# Patient Record
Sex: FEMALE | Race: WHITE | NOT HISPANIC OR LATINO | Employment: FULL TIME | URBAN - METROPOLITAN AREA
[De-identification: names, ages, dates, MRNs, and addresses within clinical notes are randomized per-mention and may not be internally consistent; named-entity substitution may affect disease eponyms.]

---

## 2017-01-23 ENCOUNTER — OFFICE VISIT (OUTPATIENT)
Dept: ENDOCRINOLOGY | Facility: CLINIC | Age: 42
End: 2017-01-23

## 2017-01-23 VITALS
HEIGHT: 70 IN | SYSTOLIC BLOOD PRESSURE: 141 MMHG | DIASTOLIC BLOOD PRESSURE: 99 MMHG | WEIGHT: 246 LBS | BODY MASS INDEX: 35.22 KG/M2 | OXYGEN SATURATION: 99 % | HEART RATE: 85 BPM

## 2017-01-23 DIAGNOSIS — E66.01 MORBID OBESITY DUE TO EXCESS CALORIES (H): ICD-10-CM

## 2017-01-23 DIAGNOSIS — I10 BENIGN ESSENTIAL HYPERTENSION: Primary | ICD-10-CM

## 2017-01-23 RX ORDER — NIFEDIPINE 90 MG/1
90 TABLET, EXTENDED RELEASE ORAL DAILY
Qty: 90 TABLET | Refills: 4 | Status: SHIPPED | OUTPATIENT
Start: 2017-01-23 | End: 2018-04-03

## 2017-01-23 RX ORDER — BUPROPION HYDROCHLORIDE 150 MG/1
150 TABLET, EXTENDED RELEASE ORAL 2 TIMES DAILY
Qty: 180 TABLET | Refills: 4 | Status: SHIPPED | OUTPATIENT
Start: 2017-01-23 | End: 2018-01-16

## 2017-01-23 RX ORDER — NALTREXONE HYDROCHLORIDE AND BUPROPION HYDROCHLORIDE 8; 90 MG/1; MG/1
TABLET, EXTENDED RELEASE ORAL
COMMUNITY
Start: 2016-04-19 | End: 2017-05-08

## 2017-01-23 RX ORDER — NALTREXONE HYDROCHLORIDE 50 MG/1
50 TABLET, FILM COATED ORAL DAILY
Qty: 90 TABLET | Refills: 4 | Status: SHIPPED | OUTPATIENT
Start: 2017-01-23 | End: 2018-01-16

## 2017-01-23 NOTE — Clinical Note
"2017       RE: Holli Saxena  [address]  [facility] [address]     Dear Colleague,    Thank you for referring your patient, Holli Saxena, to the [facility] MEDICAL WEIGHT MANAGEMENT at [facility]. Please see a copy of my visit note below.        Return Medical Weight Management Note     Holli Saxena  MRN:  [id number]  :  1975  ALIA:  2017    Dear Nivia Ruiz,    I had the pleasure of seeing your patient Holli Saxena.  She is a 41 year old female who I am continuing to see for treatment of obesity related to:  HTN, hyperlipidemia, and PCOS    INTERVAL HISTORY:  Weight unchanged. She is stress eating sugar and has not yet started an exercise plan. Her child is 2 years old now and so she is not breastfeeding and is not planning on getting pregnant.      ASSESSMENT:   Obesity, unchanged  HTN, uncontrolled    PLAN:   Increase procardia from 60 to 90 mg daily  Continue naltrexone 50 mg daily & wellbutrin  mg bid  We talked about replacing wellbutrin with phentermine, however, patient says phentermine makes her jittery so does not want to do this  RN for blood pressure check in 1-2 weeks    Patient Instructions:  Food goal: to substitute sugarless gum for food when stressed  Activity goal: to walk 3 miles 3 times weekly    FOLLOW-UP:    3 months    Time: 25 min spent on evaluation, management, counseling, education, & motivational interviewing with greater than 50 % of the total time was spent on counseling and coordinating care    Sincerely,    Umu Mahoney MD    CURRENT WEIGHT:   246 lbs 0 oz    Wt Readings from Last 4 Encounters:   17 111.585 kg (246 lb)   10/24/16 111.948 kg (246 lb 12.8 oz)   16 113.172 kg (249 lb 8 oz)   16 112.492 kg (248 lb)     Height:  5' 9.5\"  Body Mass Index:  Body mass index is 35.82 kg/(m^2).  Vitals:  B/P: 141/99, P: 85    Diet and Activity Changes Since Last Visit Reviewed With " Patient 1/19/2017   I have made the following changes to my diet since my last visit: more fruits and vegetables   With regards to my diet, I am still struggling with: sweets   For breakfast, I typically eat: coffee, banana, toast   For lunch, I typically eat: soup and sandwich on weekends; nothing during week.    For supper, I typically eat: meat with a potato; chicken with a salad; pizza;    For snack(s), I typically eat: cereal, cookies, trail mix   I have made the following changes to my activity/exercise since my last visit: yoga 2x a week   With regards to my activity/exercise, I am still struggling with: motivation after work, not taking time for myself.     ROS    MEDICATIONS:   Current Outpatient Prescriptions   Medication     CONTRAVE 8-90 MG per 12 hr tablet     NIFEdipine ER osmotic (PROCARDIA XL) 90 MG 24 hr tablet     naltrexone (DEPADE;REVIA) 50 MG tablet     buPROPion (WELLBUTRIN SR) 150 MG 12 hr tablet     norethindrone (MICRONOR) 0.35 MG per tablet     aspirin 81 MG EC tablet     Flaxseed, Linseed, (FLAXSEED OIL PO)     Multiple Vitamin (MULTIVITAMINS PO)     [DISCONTINUED] naltrexone (DEPADE;REVIA) 50 MG tablet     [DISCONTINUED] buPROPion (WELLBUTRIN SR) 150 MG 12 hr tablet     [DISCONTINUED] NIFEdipine ER osmotic (PROCARDIA XL) 60 MG TB24     No current facility-administered medications for this visit.     Weight Loss Medication History Reviewed With Patient 1/19/2017   Which weight loss medications are you currently taking on a regular basis?  Contrave (naltrexone/bupropion)   If you are not taking a weight loss medication that was prescribed to you, please indicate why: -   Are you having any side effects from the weight loss medication that we have prescribed you? No       Again, thank you for allowing me to participate in the care of your patient.      Sincerely,    Umu Mahoney MD

## 2017-01-23 NOTE — PROGRESS NOTES
"    Return Medical Weight Management Note     Holli Saxena  MRN:  [id number]  :  1975  ALIA:  2017    Dear Nivia Ruiz,    I had the pleasure of seeing your patient Holli Saxena.  She is a 41 year old female who I am continuing to see for treatment of obesity related to:  HTN, hyperlipidemia, and PCOS    INTERVAL HISTORY:  Weight unchanged. She is stress eating sugar and has not yet started an exercise plan. Her child is 2 years old now and so she is not breastfeeding and is not planning on getting pregnant.      ASSESSMENT:   Obesity, unchanged  HTN, uncontrolled    PLAN:   Increase procardia from 60 to 90 mg daily  Continue naltrexone 50 mg daily & wellbutrin  mg bid  We talked about replacing wellbutrin with phentermine, however, patient says phentermine makes her jittery so does not want to do this  RN for blood pressure check in 1-2 weeks    Patient Instructions:  Food goal: to substitute sugarless gum for food when stressed  Activity goal: to walk 3 miles 3 times weekly    FOLLOW-UP:    3 months    Time: 25 min spent on evaluation, management, counseling, education, & motivational interviewing with greater than 50 % of the total time was spent on counseling and coordinating care    Sincerely,    Umu Mahoney MD    CURRENT WEIGHT:   246 lbs 0 oz    Wt Readings from Last 4 Encounters:   17 111.585 kg (246 lb)   10/24/16 111.948 kg (246 lb 12.8 oz)   16 113.172 kg (249 lb 8 oz)   16 112.492 kg (248 lb)     Height:  5' 9.5\"  Body Mass Index:  Body mass index is 35.82 kg/(m^2).  Vitals:  B/P: 141/99, P: 85    Diet and Activity Changes Since Last Visit Reviewed With Patient 2017   I have made the following changes to my diet since my last visit: more fruits and vegetables   With regards to my diet, I am still struggling with: sweets   For breakfast, I typically eat: coffee, banana, toast   For lunch, I typically eat: soup and sandwich on weekends; " nothing during week.    For supper, I typically eat: meat with a potato; chicken with a salad; pizza;    For snack(s), I typically eat: cereal, cookies, trail mix   I have made the following changes to my activity/exercise since my last visit: yoga 2x a week   With regards to my activity/exercise, I am still struggling with: motivation after work, not taking time for myself.     ROS    MEDICATIONS:   Current Outpatient Prescriptions   Medication     CONTRAVE 8-90 MG per 12 hr tablet     NIFEdipine ER osmotic (PROCARDIA XL) 90 MG 24 hr tablet     naltrexone (DEPADE;REVIA) 50 MG tablet     buPROPion (WELLBUTRIN SR) 150 MG 12 hr tablet     norethindrone (MICRONOR) 0.35 MG per tablet     aspirin 81 MG EC tablet     Flaxseed, Linseed, (FLAXSEED OIL PO)     Multiple Vitamin (MULTIVITAMINS PO)     [DISCONTINUED] naltrexone (DEPADE;REVIA) 50 MG tablet     [DISCONTINUED] buPROPion (WELLBUTRIN SR) 150 MG 12 hr tablet     [DISCONTINUED] NIFEdipine ER osmotic (PROCARDIA XL) 60 MG TB24     No current facility-administered medications for this visit.     Weight Loss Medication History Reviewed With Patient 1/19/2017   Which weight loss medications are you currently taking on a regular basis?  Contrave (naltrexone/bupropion)   If you are not taking a weight loss medication that was prescribed to you, please indicate why: -   Are you having any side effects from the weight loss medication that we have prescribed you? No

## 2017-01-23 NOTE — Clinical Note
"2017      RE: Holli Saxena  [address]  [place] [address]           Return Medical Weight Management Note     Holli Saxena  MRN:  [id number]  :  1975  ALIA:  2017    Dear Nivia Ruiz,    I had the pleasure of seeing your patient Holli Saxena.  She is a 41 year old female who I am continuing to see for treatment of obesity related to:  HTN, hyperlipidemia, and PCOS    INTERVAL HISTORY:  Weight unchanged. She is stress eating sugar and has not yet started an exercise plan. Her child is 2 years old now and so she is not breastfeeding and is not planning on getting pregnant.      ASSESSMENT:   Obesity, unchanged  HTN, uncontrolled    PLAN:   Increase procardia from 60 to 90 mg daily  Continue naltrexone 50 mg daily & wellbutrin  mg bid  We talked about replacing wellbutrin with phentermine, however, patient says phentermine makes her jittery so does not want to do this  RN for blood pressure check in 1-2 weeks    Patient Instructions:  Food goal: to substitute sugarless gum for food when stressed  Activity goal: to walk 3 miles 3 times weekly    FOLLOW-UP:    3 months    Time: 25 min spent on evaluation, management, counseling, education, & motivational interviewing with greater than 50 % of the total time was spent on counseling and coordinating care    Sincerely,    Umu Mahoney MD    CURRENT WEIGHT:   246 lbs 0 oz    Wt Readings from Last 4 Encounters:   17 111.585 kg (246 lb)   10/24/16 111.948 kg (246 lb 12.8 oz)   16 113.172 kg (249 lb 8 oz)   16 112.492 kg (248 lb)     Height:  5' 9.5\"  Body Mass Index:  Body mass index is 35.82 kg/(m^2).  Vitals:  B/P: 141/99, P: 85    Diet and Activity Changes Since Last Visit Reviewed With Patient 2017   I have made the following changes to my diet since my last visit: more fruits and vegetables   With regards to my diet, I am still struggling with: sweets   For breakfast, I typically eat: " coffee, banana, toast   For lunch, I typically eat: soup and sandwich on weekends; nothing during week.    For supper, I typically eat: meat with a potato; chicken with a salad; pizza;    For snack(s), I typically eat: cereal, cookies, trail mix   I have made the following changes to my activity/exercise since my last visit: yoga 2x a week   With regards to my activity/exercise, I am still struggling with: motivation after work, not taking time for myself.     ROS    MEDICATIONS:   Current Outpatient Prescriptions   Medication     CONTRAVE 8-90 MG per 12 hr tablet     NIFEdipine ER osmotic (PROCARDIA XL) 90 MG 24 hr tablet     naltrexone (DEPADE;REVIA) 50 MG tablet     buPROPion (WELLBUTRIN SR) 150 MG 12 hr tablet     norethindrone (MICRONOR) 0.35 MG per tablet     aspirin 81 MG EC tablet     Flaxseed, Linseed, (FLAXSEED OIL PO)     Multiple Vitamin (MULTIVITAMINS PO)     [DISCONTINUED] naltrexone (DEPADE;REVIA) 50 MG tablet     [DISCONTINUED] buPROPion (WELLBUTRIN SR) 150 MG 12 hr tablet     [DISCONTINUED] NIFEdipine ER osmotic (PROCARDIA XL) 60 MG TB24     No current facility-administered medications for this visit.     Weight Loss Medication History Reviewed With Patient 1/19/2017   Which weight loss medications are you currently taking on a regular basis?  Contrave (naltrexone/bupropion)   If you are not taking a weight loss medication that was prescribed to you, please indicate why: -   Are you having any side effects from the weight loss medication that we have prescribed you? No       Umu Mahoney MD

## 2017-01-23 NOTE — PATIENT INSTRUCTIONS
Food goal: to substitute sugarless gum for food when stressed    Activity goal: to walk 3 miles 3 times weekly

## 2017-01-23 NOTE — NURSING NOTE
"(   Chief Complaint   Patient presents with     RECHECK     Weight Management    )    ( Weight: 111.585 kg (246 lb) )  ( Height: 176.5 cm (5' 9.5\") )  ( BMI (Calculated): 35.88 )  ( Seminar Weight: 113.309 kg (249 lb 12.8 oz) )  ( Seminar Wt Minus Current Wt (lbs): 3.8 )  (   )  (   )  (   )  (   )    ( BP: (!) 141/99 mmHg )  (   )  (   )  (   )  ( Pulse: 85 )  (   )  ( SpO2: 99 % )    (   Patient Active Problem List   Diagnosis     Obesity     PCOS (polycystic ovarian syndrome)     Essential hypertension     Hyperlipidemia     Vitamin D deficiency     Sciatica     Chronic hypertension in pregnancy     Chronic hypertension with superimposed preeclampsia     S/P  section    )  (   Current Outpatient Prescriptions   Medication Sig Dispense Refill     CONTRAVE 8-90 MG per 12 hr tablet        naltrexone (DEPADE;REVIA) 50 MG tablet Take 1 tablet (50 mg) by mouth daily KEEP APPOINTMENT FOR FUTURE REFILLS 30 tablet 0     buPROPion (WELLBUTRIN SR) 150 MG 12 hr tablet Take 1 tablet (150 mg) by mouth 2 times daily 60 tablet 0     NIFEdipine ER osmotic (PROCARDIA XL) 60 MG TB24 Take 1 tablet (60 mg) by mouth daily 90 tablet 3     norethindrone (MICRONOR) 0.35 MG per tablet Take 1 tablet (0.35 mg) by mouth daily 90 tablet 3     aspirin 81 MG EC tablet Take 1 tablet (81 mg) by mouth daily 90 tablet 3     Flaxseed, Linseed, (FLAXSEED OIL PO) Take as directed       Multiple Vitamin (MULTIVITAMINS PO) Take 1 tablet by mouth daily.      )  ( Diabetes Eval:    )    ( Pain Eval:  No Pain (0) )    ( Wound Eval:       )    (   History   Smoking status     Never Smoker    Smokeless tobacco     Never Used    )    ( Signed By:  Mariely Arteaga; 2017; 10:24 AM )  "

## 2017-01-23 NOTE — MR AVS SNAPSHOT
After Visit Summary   1/23/2017    Holli Saxena    MRN: [id number]           Patient Information     Date Of Birth          1975        Visit Information        Provider Department      1/23/2017 10:20 AM Umu Mahoney MD [facility]        Today's Diagnoses     Benign essential hypertension    -  1     Morbid obesity due to excess calories (H)           Care Instructions    Food goal: to substitute sugarless gum for food when stressed    Activity goal: to walk 3 miles 3 times weekly                Follow-ups after your visit        Follow-up notes from your care team     Return in about 3 months (around 4/23/2017).      Your next 10 appointments already scheduled     May 08, 2017 11:40 AM   (Arrive by 11:25 AM)   Return Visit with MD ANGEL [facility] ([facility])    [address] [place] [address] [phone]              Who to contact     Please call your clinic at [phone] to:    Ask questions about your health    Make or cancel appointments    Discuss your medicines    Learn about your test results    Speak to your doctor   If you have compliments or concerns about an experience at your clinic, or if you wish to file a complaint, please contact [facility] Patient Relations at [phone] or email us at [email or web address]         Additional Information About Your Visit        MyChart Information     [employer] gives you secure access to your electronic health record. If you see a primary care provider, you can also send messages to your care team and make appointments. If you have questions, please call your primary care clinic.  If you do not have a primary care provider, please call [phone] and they will assist you.      [employer] is an electronic gateway that provides easy, online access to your medical records. With  "MyChart, you can request a clinic appointment, read your test results, renew a prescription or communicate with your care team.     To access your existing account, please contact your [facility] or call [phone] for assistance.        Care EveryWhere ID     This is your Care EveryWhere ID. This could be used by other organizations to access your [facility] medical records  [id number]        Your Vitals Were     Pulse Height BMI (Body Mass Index) Pulse Oximetry          85 5' 9.5\" 35.82 kg/m2 99%         Blood Pressure from Last 3 Encounters:   01/23/17 141/99   10/24/16 140/90   08/29/16 143/101    Weight from Last 3 Encounters:   01/23/17 246 lb   10/24/16 246 lb 12.8 oz   08/29/16 249 lb 8 oz              Today, you had the following     No orders found for display         Today's Medication Changes          These changes are accurate as of: 1/23/17 10:49 AM.  If you have any questions, ask your nurse or doctor.               These medicines have changed or have updated prescriptions.        Dose/Directions    NIFEdipine ER osmotic 90 MG 24 hr tablet   Commonly known as:  PROCARDIA XL   This may have changed:    - medication strength  - how much to take   Used for:  Benign essential hypertension        Dose:  90 mg   Take 1 tablet (90 mg) by mouth daily   Quantity:  90 tablet   Refills:  4         Stop taking these medicines if you haven't already. Please contact your care team if you have questions.     calcium carbonate 500 MG chewable tablet   Commonly known as:  TUMS                Where to get your medicines      These medications were sent to [place] [address] [place] [facility] [address] [address] [address]    Hours:  Tech issues with their phone system Phone:  [phone]    - buPROPion 150 MG 12 hr tablet  - naltrexone 50 MG tablet  - NIFEdipine ER osmotic 90 MG 24 hr tablet             Primary Care Provider Office Phone # Fax #    " Nivia Ruiz -[phone]       [address] [address]        Thank you!     Thank you for choosing [facility] WEIGHT MANAGEMENT  for your care. Our goal is always to provide you with excellent care. Hearing back from our patients is one way we can continue to improve our services. Please take a few minutes to complete the written survey that you may receive in the mail after your visit with us. Thank you!             Your Updated Medication List - Protect others around you: Learn how to safely use, store and throw away your medicines at www.disposemymeds.org.          This list is accurate as of: 1/23/17 10:49 AM.  Always use your most recent med list.                   Brand Name Dispense Instructions for use    aspirin 81 MG EC tablet     90 tablet    Take 1 tablet (81 mg) by mouth daily       buPROPion 150 MG 12 hr tablet    WELLBUTRIN SR    180 tablet    Take 1 tablet (150 mg) by mouth 2 times daily       CONTRAVE 8-90 MG per 12 hr tablet   Generic drug:  naltrexone-bupropion          FLAXSEED OIL PO      Take as directed       MULTIVITAMINS PO      Take 1 tablet by mouth daily.       naltrexone 50 MG tablet    DEPADE;REVIA    90 tablet    Take 1 tablet (50 mg) by mouth daily KEEP APPOINTMENT FOR FUTURE REFILLS       NIFEdipine ER osmotic 90 MG 24 hr tablet    PROCARDIA XL    90 tablet    Take 1 tablet (90 mg) by mouth daily       norethindrone 0.35 MG per tablet    MICRONOR    90 tablet    Take 1 tablet (0.35 mg) by mouth daily

## 2017-05-04 ASSESSMENT — ENCOUNTER SYMPTOMS
SLEEP DISTURBANCES DUE TO BREATHING: 0
RECTAL BLEEDING: 0
HEARTBURN: 1
PALPITATIONS: 0
DECREASED CONCENTRATION: 1
EXERCISE INTOLERANCE: 0
HYPOTENSION: 0
DEPRESSION: 1
NAUSEA: 0
ORTHOPNEA: 0
DIARRHEA: 0
LEG SWELLING: 1
SYNCOPE: 0
NERVOUS/ANXIOUS: 1
LEG PAIN: 0
VOMITING: 0
JAUNDICE: 0
LIGHT-HEADEDNESS: 0
PANIC: 0
BOWEL INCONTINENCE: 0
RECTAL PAIN: 0
TACHYCARDIA: 0
CONSTIPATION: 0
INSOMNIA: 0
BLOATING: 1
BLOOD IN STOOL: 0
HYPERTENSION: 1
CLAUDICATION: 0
ABDOMINAL PAIN: 0

## 2017-05-08 ENCOUNTER — OFFICE VISIT (OUTPATIENT)
Dept: ENDOCRINOLOGY | Facility: CLINIC | Age: 42
End: 2017-05-08

## 2017-05-08 VITALS
TEMPERATURE: 98.8 F | OXYGEN SATURATION: 98 % | WEIGHT: 248.7 LBS | HEART RATE: 79 BPM | HEIGHT: 70 IN | SYSTOLIC BLOOD PRESSURE: 136 MMHG | DIASTOLIC BLOOD PRESSURE: 94 MMHG | BODY MASS INDEX: 35.6 KG/M2

## 2017-05-08 DIAGNOSIS — E66.01 MORBID OBESITY, UNSPECIFIED OBESITY TYPE (H): Primary | ICD-10-CM

## 2017-05-08 ASSESSMENT — PAIN SCALES - GENERAL: PAINLEVEL: NO PAIN (0)

## 2017-05-08 NOTE — LETTER
Patient:  Holli Saxena  :   1975  MRN:     [id number]      Ms. Holli Saxena  [address] [address] [place] [id number]      May 24, 2017    To whom it may concern,    This is a physician statement of medical necessity for an adjustable desk  for my patient, Ms. Holli Saxena, for use at work and at home for treatment of obesity.    Umu Mahoney MD  Endocrinologist

## 2017-05-08 NOTE — NURSING NOTE
"(   Chief Complaint   Patient presents with     Weight Loss     3 month follow up for weight loss    )    ( Weight: 248 lb 11.2 oz )  ( Height: 5' 9.5\" )  ( BMI (Calculated): 36.28 )  (   )  (   )  (   )  (   )  (   )  (   )    ( BP: (!) 136/94 )  (   )  ( Temp: 98.8  F (37.1  C) )  ( Temp src: Oral )  ( Pulse: 79 )  (   )  ( SpO2: 98 % )    (   Patient Active Problem List   Diagnosis     Obesity     PCOS (polycystic ovarian syndrome)     Essential hypertension     Hyperlipidemia     Vitamin D deficiency     Sciatica     Chronic hypertension in pregnancy     Chronic hypertension with superimposed preeclampsia     S/P  section    )  (   Current Outpatient Prescriptions   Medication Sig Dispense Refill     NIFEdipine ER osmotic (PROCARDIA XL) 90 MG 24 hr tablet Take 1 tablet (90 mg) by mouth daily 90 tablet 4     naltrexone (DEPADE;REVIA) 50 MG tablet Take 1 tablet (50 mg) by mouth daily KEEP APPOINTMENT FOR FUTURE REFILLS 90 tablet 4     buPROPion (WELLBUTRIN SR) 150 MG 12 hr tablet Take 1 tablet (150 mg) by mouth 2 times daily 180 tablet 4     norethindrone (MICRONOR) 0.35 MG per tablet Take 1 tablet (0.35 mg) by mouth daily 90 tablet 3     aspirin 81 MG EC tablet Take 1 tablet (81 mg) by mouth daily 90 tablet 3     Flaxseed, Linseed, (FLAXSEED OIL PO) Take as directed       Multiple Vitamin (MULTIVITAMINS PO) Take 1 tablet by mouth daily.      )  ( Diabetes Eval:    )    ( Pain Eval:  No Pain (0) )      History   Smoking Status     Never Smoker   Smokeless Tobacco     Never Used    )    ( Signed By:  Franny Fournier; May 8, 2017; 12:36 PM )    "

## 2017-05-08 NOTE — MR AVS SNAPSHOT
"              After Visit Summary   5/8/2017    Holli Saxena    MRN: [id number]           Patient Information     Date Of Birth          1975        Visit Information        Provider Department      5/8/2017 11:40 AM Umu Mahoney MD  Health Medical Weight Management        Care Instructions    Food goal: \"Hello Fresh\"    Activity goal: to continue to run after little Landen & to walk 3 miles 3 x weekly    Family goal: to give Holli a break when she walks into the door from work, minimum 30 minutes where no one talks to her, looks at her. She needs to be able to focus on her health goals so that she will live long and prosperous so she can help take care of all of you.    Therapy goal: to make an appointment with Brittni Del Toro    Environmental goal: to get a Varidesk for long-term treatment of obesity associated with hypertension, hyperlipidemia    -Dr. Umu Mahoney MD 5/8/17                          Follow-ups after your visit        Follow-up notes from your care team     Return in about 3 months (around 8/8/2017).      Who to contact     Please call your clinic at [phone] to:    Ask questions about your health    Make or cancel appointments    Discuss your medicines    Learn about your test results    Speak to your doctor   If you have compliments or concerns about an experience at your clinic, or if you wish to file a complaint, please contact [facility] Patient Relations at [phone] or email us at [email or web address]         Additional Information About Your Visit        MyChart Information     MyChart gives you secure access to your electronic health record. If you see a primary care provider, you can also send messages to your care team and make appointments. If you have questions, please call your primary care clinic.  If you do not have a primary care provider, please call [phone] and they will assist you.      [employer]ush[employer] is an " "electronic gateway that provides easy, online access to your medical records. With [employer], you can request a clinic appointment, read your test results, renew a prescription or communicate with your care team.     To access your existing account, please contact your [facility] or call [phone] for assistance.        Care EveryWhere ID     This is your Care EveryWhere ID. This could be used by other organizations to access your [place] medical records  [id number]        Your Vitals Were     Pulse Temperature Height Last Period Pulse Oximetry BMI (Body Mass Index)    79 98.8  F (37.1  C) (Oral) 1.765 m (5' 9.5\") 04/24/2017 (Approximate) 98% 36.2 kg/m2       Blood Pressure from Last 3 Encounters:   05/08/17 (!) 136/94   01/23/17 (!) 141/99   10/24/16 140/90    Weight from Last 3 Encounters:   05/08/17 112.8 kg (248 lb 11.2 oz)   01/23/17 111.6 kg (246 lb)   10/24/16 111.9 kg (246 lb 12.8 oz)              Today, you had the following     No orders found for display       Primary Care Provider Office Phone # Fax #    Nivia Ruiz [phone]       [address] [address]        Thank you!     Thank you for choosing [facility] WEIGHT MANAGEMENT  for your care. Our goal is always to provide you with excellent care. Hearing back from our patients is one way we can continue to improve our services. Please take a few minutes to complete the written survey that you may receive in the mail after your visit with us. Thank you!             Your Updated Medication List - Protect others around you: Learn how to safely use, store and throw away your medicines at www.disposemymeds.org.          This list is accurate as of: 5/8/17  1:20 PM.  Always use your most recent med list.                   Brand Name Dispense Instructions for use    aspirin 81 MG EC tablet     90 tablet    Take 1 tablet (81 mg) by mouth daily       buPROPion 150 " MG 12 hr tablet    WELLBUTRIN SR    180 tablet    Take 1 tablet (150 mg) by mouth 2 times daily       FLAXSEED OIL PO      Take as directed       MULTIVITAMINS PO      Take 1 tablet by mouth daily.       naltrexone 50 MG tablet    DEPADE;REVIA    90 tablet    Take 1 tablet (50 mg) by mouth daily KEEP APPOINTMENT FOR FUTURE REFILLS       NIFEdipine ER osmotic 90 MG 24 hr tablet    PROCARDIA XL    90 tablet    Take 1 tablet (90 mg) by mouth daily       norethindrone 0.35 MG per tablet    MICRONOR    90 tablet    Take 1 tablet (0.35 mg) by mouth daily

## 2017-05-08 NOTE — PATIENT INSTRUCTIONS
"Food goal: \"Hello Fresh\"    Activity goal: to continue to run after chet Schuster & to walk 3 miles 3 x weekly    Family goal: to give Holli a break when she walks into the door from work, minimum 30 minutes where no one talks to her, looks at her. She needs to be able to focus on her health goals so that she will live long and prosperous so she can help take care of all of you.    Therapy goal: to make an appointment with Brittni Del Toro    Environmental goal: to get a Varidesk for long-term treatment of obesity associated with hypertension, hyperlipidemia    -Dr. Umu Mahoney MD 5/8/17                    "

## 2017-05-08 NOTE — LETTER
"2017       RE: Holli Saxena  [address]  [facility] [address]     Dear Colleague,    Thank you for referring your patient, Holli Saxena, to the [facility] MEDICAL WEIGHT MANAGEMENT at [facility]. Please see a copy of my visit note below.    Return Medical Weight Management Note     Holli Saxena  MRN:  [id number]  :  1975  ALIA:  2017    Dear Nivia Ruiz,    I had the pleasure of seeing your patient Holli Saxena.  She is a 42 year old female who I am continuing to see for treatment of obesity related to:  HTN, hyperlipidemia, and PCOS    INTERVAL HISTORY:  She has made good lifestyle changes but continues to struggle with unwanted eating.    CURRENT WEIGHT:   248 lbs 11.2 oz    Wt Readings from Last 4 Encounters:   17 112.8 kg (248 lb 11.2 oz)   17 111.6 kg (246 lb)   10/24/16 111.9 kg (246 lb 12.8 oz)   16 113.2 kg (249 lb 8 oz)       Height:  5' 9.5\"  Body Mass Index:  Body mass index is 36.2 kg/(m^2).  Vitals:  B/P: 136/94, P: 79    Initial consult weight was 246 lbs on 17.  Weight change since last seen on 2017 is up 2 pounds.   Total gain is 23 pounds (since 1st visit to [facility] clinic on 7/12/10, prior to pregnancy)    Diet and Activity Changes Since Last Visit Reviewed With Patient 2017   I have made the following changes to my diet since my last visit: Hello fresh meals 3x a week    With regards to my diet, I am still struggling with: Sweets   For breakfast, I typically eat: Protein bar and fruit. Coffee.     For lunch, I typically eat: [place] and veggies,  nothing if I forget to pack a lunch. Snack on crackers and nuts.    For supper, I typically eat: Hello Fresh meal,  scrambled eggs and jenkins,  chicken and salad,  etc.    For snack(s), I typically eat: Nuts, crackers, cookies,  various sweets.    I have made the following changes to my activity/exercise since my last visit: Walk more just not " "enough.    With regards to my activity/exercise, I am still struggling with: Motivation and energy. I want to but am so tired when I get home from work and I have to take care of son and .       ROS    MEDICATIONS:   Current Outpatient Prescriptions   Medication     NIFEdipine ER osmotic (PROCARDIA XL) 90 MG 24 hr tablet     naltrexone (DEPADE;REVIA) 50 MG tablet     buPROPion (WELLBUTRIN SR) 150 MG 12 hr tablet     norethindrone (MICRONOR) 0.35 MG per tablet     aspirin 81 MG EC tablet     Flaxseed, Linseed, (FLAXSEED OIL PO)     Multiple Vitamin (MULTIVITAMINS PO)     No current facility-administered medications for this visit.        Weight Loss Medication History Reviewed With Patient 5/4/2017   Which weight loss medications are you currently taking on a regular basis?  Contrave (naltrexone/bupropion)   If you are not taking a weight loss medication that was prescribed to you, please indicate why: -   Are you having any side effects from the weight loss medication that we have prescribed you? No       ASSESSMENT:   Morbid obesity, refractory to wellbutrin/naltrexone combination  Phentermine makes her \"jittery\"  She continues to struggle with stress related to work (she was recently promoted) and being a new mom    PLAN:   Continue wellbutrin/naltrexone as she does the following:    Food goal: \"Hello Fresh\"     Activity goal: to continue to run after [employer]en & to walk 3 miles 3 x weekly     Family goal: to give Holli a break when she walks into the door from work, minimum 30 minutes where no one talks to her, looks at her. She needs to be able to focus on her health goals so that she will live long and prosperous so she can help take care of all of you.     Therapy goal: to make an appointment with Brittni Del Toro     Environmental goal: to get a Varidesk for long-term treatment of obesity associated with hypertension, hyperlipidemia    ANDREAN for Veridesk written per patient request      FOLLOW-UP:    12 " weeks.    Time: 25 min spent on evaluation, management, counseling, education, & motivational interviewing with greater than 50 % of the total time was spent on counseling and coordinating care    Sincerely,    Umu Mahoney MD

## 2017-05-24 NOTE — PROGRESS NOTES
"    Return Medical Weight Management Note     Holli Saxena  MRN:  [id number]  :  1975  ALIA:  2017    Dear Nivia Ruiz,    I had the pleasure of seeing your patient Holli Saxena.  She is a 42 year old female who I am continuing to see for treatment of obesity related to:  HTN, hyperlipidemia, and PCOS    INTERVAL HISTORY:  She has made good lifestyle changes but continues to struggle with unwanted eating.    CURRENT WEIGHT:   248 lbs 11.2 oz    Wt Readings from Last 4 Encounters:   17 112.8 kg (248 lb 11.2 oz)   17 111.6 kg (246 lb)   10/24/16 111.9 kg (246 lb 12.8 oz)   16 113.2 kg (249 lb 8 oz)       Height:  5' 9.5\"  Body Mass Index:  Body mass index is 36.2 kg/(m^2).  Vitals:  B/P: 136/94, P: 79    Initial consult weight was 246 lbs on 17.  Weight change since last seen on 2017 is up 2 pounds.   Total gain is 23 pounds (since 1st visit to [facility] clinic on 7/12/10, prior to pregnancy)    Diet and Activity Changes Since Last Visit Reviewed With Patient 2017   I have made the following changes to my diet since my last visit: Hello fresh meals 3x a week    With regards to my diet, I am still struggling with: Sweets   For breakfast, I typically eat: Protein bar and fruit. Coffee.     For lunch, I typically eat: [place] and veggies,  nothing if I forget to pack a lunch. Snack on crackers and nuts.    For supper, I typically eat: Hello Fresh meal,  scrambled eggs and jenkins,  chicken and salad,  etc.    For snack(s), I typically eat: Nuts, crackers, cookies,  various sweets.    I have made the following changes to my activity/exercise since my last visit: Walk more just not enough.    With regards to my activity/exercise, I am still struggling with: Motivation and energy. I want to but am so tired when I get home from work and I have to take care of son and .       ROS    MEDICATIONS:   Current Outpatient Prescriptions   Medication     NIFEdipine ER osmotic " "(PROCARDIA XL) 90 MG 24 hr tablet     naltrexone (DEPADE;REVIA) 50 MG tablet     buPROPion (WELLBUTRIN SR) 150 MG 12 hr tablet     norethindrone (MICRONOR) 0.35 MG per tablet     aspirin 81 MG EC tablet     Flaxseed, Linseed, (FLAXSEED OIL PO)     Multiple Vitamin (MULTIVITAMINS PO)     No current facility-administered medications for this visit.        Weight Loss Medication History Reviewed With Patient 5/4/2017   Which weight loss medications are you currently taking on a regular basis?  Contrave (naltrexone/bupropion)   If you are not taking a weight loss medication that was prescribed to you, please indicate why: -   Are you having any side effects from the weight loss medication that we have prescribed you? No       ASSESSMENT:   Morbid obesity, refractory to wellbutrin/naltrexone combination  Phentermine makes her \"jittery\"  She continues to struggle with stress related to work (she was recently promoted) and being a new mom    PLAN:   Continue wellbutrin/naltrexone as she does the following:    Food goal: \"Hello Fresh\"     Activity goal: to continue to run after [employer] & to walk 3 miles 3 x weekly     Family goal: to give Holli a break when she walks into the door from work, minimum 30 minutes where no one talks to her, looks at her. She needs to be able to focus on her health goals so that she will live long and prosperous so she can help take care of all of you.     Therapy goal: to make an appointment with Brittni Del Toro     Environmental goal: to get a Varidesk for long-term treatment of obesity associated with hypertension, hyperlipidemia    LMN for Eric written per patient request      FOLLOW-UP:    12 weeks.    Time: 25 min spent on evaluation, management, counseling, education, & motivational interviewing with greater than 50 % of the total time was spent on counseling and coordinating care    Sincerely,    Umu Mahoney MD  "

## 2017-08-06 ENCOUNTER — MYC REFILL (OUTPATIENT)
Dept: INTERNAL MEDICINE | Facility: CLINIC | Age: 42
End: 2017-08-06

## 2017-08-06 DIAGNOSIS — Z30.41 ENCOUNTER FOR SURVEILLANCE OF CONTRACEPTIVE PILLS: ICD-10-CM

## 2017-08-09 RX ORDER — ACETAMINOPHEN AND CODEINE PHOSPHATE 120; 12 MG/5ML; MG/5ML
1 SOLUTION ORAL DAILY
Qty: 30 TABLET | Refills: 0 | Status: SHIPPED | OUTPATIENT
Start: 2017-08-09 | End: 2017-09-20

## 2017-08-09 NOTE — TELEPHONE ENCOUNTER
Message from MyChart:  Original authorizing provider: MD Holli Wilkerson would like a refill of the following medications:  norethindrone (MICRONOR) 0.35 MG per tablet [Kenyatta Del Castillo MD]    Preferred pharmacy: [facility] [id number] [place] [id number] [facility]    Comment:

## 2017-09-20 ENCOUNTER — OFFICE VISIT (OUTPATIENT)
Dept: INTERNAL MEDICINE | Facility: CLINIC | Age: 42
End: 2017-09-20
Attending: INTERNAL MEDICINE
Payer: COMMERCIAL

## 2017-09-20 VITALS
SYSTOLIC BLOOD PRESSURE: 139 MMHG | DIASTOLIC BLOOD PRESSURE: 95 MMHG | BODY MASS INDEX: 36.51 KG/M2 | HEIGHT: 70 IN | HEART RATE: 96 BPM | WEIGHT: 255 LBS

## 2017-09-20 DIAGNOSIS — I10 BENIGN ESSENTIAL HYPERTENSION: Primary | ICD-10-CM

## 2017-09-20 DIAGNOSIS — Z00.00 ROUTINE GENERAL MEDICAL EXAMINATION AT A HEALTH CARE FACILITY: ICD-10-CM

## 2017-09-20 PROCEDURE — 99213 OFFICE O/P EST LOW 20 MIN: CPT | Mod: ZF

## 2017-09-20 RX ORDER — LISINOPRIL 10 MG/1
10 TABLET ORAL DAILY
Qty: 90 TABLET | Refills: 1 | Status: SHIPPED | OUTPATIENT
Start: 2017-09-20 | End: 2017-10-25

## 2017-09-20 ASSESSMENT — ENCOUNTER SYMPTOMS
SLEEP DISTURBANCES DUE TO BREATHING: 0
MUSCLE WEAKNESS: 0
NECK PAIN: 0
SYNCOPE: 0
BACK PAIN: 1
POLYDIPSIA: 0
ALTERED TEMPERATURE REGULATION: 0
HALLUCINATIONS: 0
SINUS PAIN: 0
WEIGHT GAIN: 0
PALPITATIONS: 1
EXERCISE INTOLERANCE: 0
HEARTBURN: 1
VOMITING: 0
SORE THROAT: 0
LEG PAIN: 0
SMELL DISTURBANCE: 0
LEG SWELLING: 1
LIGHT-HEADEDNESS: 0
DECREASED APPETITE: 0
DEPRESSION: 1
BLOATING: 1
NERVOUS/ANXIOUS: 1
RECTAL PAIN: 0
HOARSE VOICE: 0
ABDOMINAL PAIN: 0
RECTAL BLEEDING: 0
BLOOD IN STOOL: 0
POLYPHAGIA: 0
JAUNDICE: 0
HYPOTENSION: 0
PANIC: 0
TACHYCARDIA: 1
FATIGUE: 1
CLAUDICATION: 0
FEVER: 0
ARTHRALGIAS: 0
INCREASED ENERGY: 0
DIARRHEA: 0
STIFFNESS: 0
MYALGIAS: 1
BOWEL INCONTINENCE: 0
JOINT SWELLING: 0
CONSTIPATION: 0
TROUBLE SWALLOWING: 0
DECREASED CONCENTRATION: 1
NAUSEA: 0
WEIGHT LOSS: 0
CHILLS: 0
INSOMNIA: 0
TASTE DISTURBANCE: 0
MUSCLE CRAMPS: 0
NECK MASS: 0
HYPERTENSION: 1
SINUS CONGESTION: 0
ORTHOPNEA: 0
NIGHT SWEATS: 1

## 2017-09-20 ASSESSMENT — ANXIETY QUESTIONNAIRES
2. NOT BEING ABLE TO STOP OR CONTROL WORRYING: NOT AT ALL
GAD7 TOTAL SCORE: 2
1. FEELING NERVOUS, ANXIOUS, OR ON EDGE: SEVERAL DAYS
7. FEELING AFRAID AS IF SOMETHING AWFUL MIGHT HAPPEN: NOT AT ALL
5. BEING SO RESTLESS THAT IT IS HARD TO SIT STILL: NOT AT ALL
6. BECOMING EASILY ANNOYED OR IRRITABLE: NOT AT ALL
IF YOU CHECKED OFF ANY PROBLEMS ON THIS QUESTIONNAIRE, HOW DIFFICULT HAVE THESE PROBLEMS MADE IT FOR YOU TO DO YOUR WORK, TAKE CARE OF THINGS AT HOME, OR GET ALONG WITH OTHER PEOPLE: NOT DIFFICULT AT ALL
3. WORRYING TOO MUCH ABOUT DIFFERENT THINGS: SEVERAL DAYS

## 2017-09-20 ASSESSMENT — PATIENT HEALTH QUESTIONNAIRE - PHQ9
SUM OF ALL RESPONSES TO PHQ QUESTIONS 1-9: 2
5. POOR APPETITE OR OVEREATING: NOT AT ALL

## 2017-09-20 ASSESSMENT — PAIN SCALES - GENERAL: PAINLEVEL: NO PAIN (0)

## 2017-09-20 NOTE — PROGRESS NOTES
SUBJECTIVE:   CC: Holli Saxena is an 42 year old woman who presents for preventive health visit.     Healthy Habits:    Do you get at least three servings of calcium containing foods daily (dairy, green leafy vegetables, etc.)? yes    Amount of exercise or daily activities, outside of work: not regualr    Problems taking medications regularly No    Medication side effects: No    Have you had an eye exam in the past two years? yes    Do you see a dentist twice per year? yes    Do you have sleep apnea, excessive snoring or daytime drowsiness?no          -------------------------------------    Today's PHQ-2 Score: PHQ-2 ( 1999 Pfizer) 9/20/2017   Q1: Little interest or pleasure in doing things 1   Q2: Feeling down, depressed or hopeless 1   PHQ-2 Score 2   Q1: Little interest or pleasure in doing things Several days   Q2: Feeling down, depressed or hopeless Several days   PHQ-2 Score 2         Abuse: Current or Past(Physical, Sexual or Emotional)- No  Do you feel safe in your environment - Yes  Social History   Substance Use Topics     Smoking status: Never Smoker     Smokeless tobacco: Never Used     Alcohol use No     The patient does not drink >3 drinks per day nor >7 drinks per week.    Reviewed orders with patient.  Reviewed health maintenance and updated orders accordingly - Yes  Labs reviewed in EPIC    Patient under age 50, mutual decision reflected in health maintenance.        Pertinent mammograms are reviewed under the imaging tab.  History of abnormal Pap smear: NO - age 30-65 PAP every 5 years with negative HPV co-testing recommended    Reviewed and updated as needed this visit by clinical staffTobao  Allergies  Meds         Reviewed and updated as needed this visit by Provider        Past Medical History:   Diagnosis Date     Hx of previous reproductive problem      Hyperlipidemia      Obesity, unspecified      PCOS (polycystic ovarian syndrome)      Unspecified essential hypertension        Past Surgical History:   Procedure Laterality Date      SECTION N/A 2015    Procedure:  SECTION;  Surgeon: Meena Arnold MD;  Location:  L+D     NO HISTORY OF SURGERY         ROS:  Review of Systems     Constitutional:  Positive for fatigue, night sweats and recent stressors. Negative for fever, chills, weight loss, weight gain, decreased appetite, height loss, post-operative complications, incisional pain, hallucinations, increased energy, hyperactivity and confused.   HENT:  Positive for tinnitus. Negative for ear pain, hearing loss, nosebleeds, trouble swallowing, hoarse voice, mouth sores, sore throat, ear discharge, tooth pain, gum tenderness, taste disturbance, smell disturbance, hearing aid, bleeding gums, dry mouth, sinus pain, sinus congestion and neck mass.    Eyes:  Negative for double vision, pain, redness, eye pain, decreased vision, eye watering, eye bulging, eye dryness, flashing lights, spots, floaters, strabismus, tunnel vision, jaundice and eye irritation.   Respiratory:   Negative for cough, hemoptysis, sputum production, shortness of breath, wheezing, sleep disturbances due to breathing, snores loudly, respiratory pain, dyspnea on exertion, cough disturbing sleep and postural dyspnea.    Cardiovascular:  Positive for palpitations, leg swelling, hypertension and tachycardia. Negative for chest pain, dyspnea on exertion, orthopnea, claudication, fingers/toes turn blue, hypotension, syncope, history of heart murmur, chest pain on exertion, chest pain at rest, pacemaker, few scattered varicosities, leg pain, sleep disturbances due to breathing, light-headedness, exercise intolerance and edema.   Gastrointestinal:  Positive for heartburn, bloating and hemorrhoids. Negative for nausea, vomiting, abdominal pain, diarrhea, constipation, blood in stool, melena, rectal pain, bowel incontinence, jaundice, rectal bleeding, coffee ground emesis and change in stool.  "  Genitourinary:  Negative for bladder incontinence, dysuria, urgency, hematuria, flank pain, vaginal discharge, difficulty urinating, genital sores, dyspareunia, decreased libido, nocturia, voiding less frequently, arousal difficulty, abnormal vaginal bleeding, excessive menstruation, menstrual changes, hot flashes, vaginal dryness and postmenopausal bleeding.   Musculoskeletal:  Positive for myalgias and back pain. Negative for joint swelling, arthralgias, stiffness, muscle cramps, neck pain, bone pain, muscle weakness and fracture.   Skin:  Negative for nail changes, itching, poor wound healing, rash, hair changes, skin changes, acne, warts, poor wound healing, scarring, flaky skin, Raynaud's phenomenon, sensitivity to sunlight and skin thickening.   Neurological:  Negative for dizziness, tingling, tremors, speech change, seizures, loss of consciousness, weakness, light-headedness, numbness, headaches, disturbances in coordination, extremity numbness, memory loss, difficulty walking and paralysis.   Endo/Heme:  Negative for anemia, swollen glands and bruises/bleeds easily.   Psychiatric/Behavioral:  Positive for depression and decreased concentration. Negative for hallucinations, memory loss, mood swings and panic attacks.    Breast:  Negative for breast discharge, breast mass, breast pain and nipple retraction.   Endocrine:  Negative for altered temperature regulation, polyphagia, polydipsia, unwanted hair growth and change in facial hair.        OBJECTIVE:   BP (!) 139/95  Pulse 96  Ht 1.778 m (5' 10\")  Wt 115.7 kg (255 lb)  LMP 09/05/2017  Breastfeeding? No  BMI 36.59 kg/m2  EXAM:  GENERAL: healthy, alert and no distress  EYES: Eyes grossly normal to inspection, PERRL and conjunctivae and sclerae normal  HENT: ear canals and TM's normal, nose and mouth without ulcers or lesions  NECK: no adenopathy, no asymmetry, masses, or scars and thyroid normal to palpation  RESP: lungs clear to auscultation - no " "rales, rhonchi or wheezes  CV: regular rate and rhythm, normal S1 S2, no S3 or S4, no murmur, click or rub, no peripheral edema and peripheral pulses strong  ABDOMEN: soft, nontender, no hepatosplenomegaly, no masses and bowel sounds normal  MS: no gross musculoskeletal defects noted, no edema  SKIN: no suspicious lesions or rashes  NEURO: Normal strength and tone, mentation intact and speech normal  PSYCH: mentation appears normal, affect normal/bright    ASSESSMENT/PLAN:   1. Routine general medical examination at a health care facility  Patient is up to date on cervical cancer screening. Recommend lipid screening as well as screening for diabetes.   - Basic Metabolic Panel; Future  - Lipid Profile; Future    2. Benign essential hypertension  Blood pressure is elevated today. Recommend adding lisinopril to medical regimen. Possible side effects were discussed with patient. Will follow up in 2-3 weeks to assess blood pressure response. Patient is in agreement with the plan.  - lisinopril (PRINIVIL/ZESTRIL) 10 MG tablet; Take 1 tablet (10 mg) by mouth daily  Dispense: 90 tablet; Refill: 1  - CBC with Platelets; Future  - TSH with free T4 reflex; Future    COUNSELING:   Reviewed preventive health counseling, as reflected in patient instructions       Regular exercise       Healthy diet/nutrition       Vision screening         reports that she has never smoked. She has never used smokeless tobacco.    Estimated body mass index is 36.59 kg/(m^2) as calculated from the following:    Height as of this encounter: 1.778 m (5' 10\").    Weight as of this encounter: 115.7 kg (255 lb).         Counseling Resources:  ATP IV Guidelines  Pooled Cohorts Equation Calculator  Breast Cancer Risk Calculator  FRAX Risk Assessment  ICSI Preventive Guidelines  Dietary Guidelines for Americans, 2010  [employer]'s MyPlate  ASA Prophylaxis  Lung CA Screening    Kenyatta Del Castillo MD  [facility]   "

## 2017-09-20 NOTE — LETTER
9/20/2017       RE: Holli Saxena  [address]  [facility] [address]     Dear Colleague,    Thank you for referring your patient, Holli Saxena, to the [facility]  at [facility] Please see a copy of my visit note below.       SUBJECTIVE:   CC: Holli Saxena is an 42 year old woman who presents for preventive health visit.     Healthy Habits:    Do you get at least three servings of calcium containing foods daily (dairy, green leafy vegetables, etc.)? yes    Amount of exercise or daily activities, outside of work: not regualr    Problems taking medications regularly No    Medication side effects: No    Have you had an eye exam in the past two years? yes    Do you see a dentist twice per year? yes    Do you have sleep apnea, excessive snoring or daytime drowsiness?no          -------------------------------------    Today's PHQ-2 Score: PHQ-2 ( 1999 [employer]) 9/20/2017   Q1: Little interest or pleasure in doing things 1   Q2: Feeling down, depressed or hopeless 1   PHQ-2 Score 2   Q1: Little interest or pleasure in doing things Several days   Q2: Feeling down, depressed or hopeless Several days   PHQ-2 Score 2         Abuse: Current or Past(Physical, Sexual or Emotional)- No  Do you feel safe in your environment - Yes  Social History   Substance Use Topics     Smoking status: Never Smoker     Smokeless tobacco: Never Used     Alcohol use No     The patient does not drink >3 drinks per day nor >7 drinks per week.    Reviewed orders with patient.  Reviewed health maintenance and updated orders accordingly - Yes  Labs reviewed in EPIC    Patient under age 50, mutual decision reflected in health maintenance.        Pertinent mammograms are reviewed under the imaging tab.  History of abnormal Pap smear: NO - age 30-65 PAP every 5 years with negative HPV co-testing recommended    Reviewed and updated as needed this visit by clinical staffTobaccanthony   Allergies  Meds         Reviewed and updated as needed this visit by Provider        Past Medical History:   Diagnosis Date     Hx of previous reproductive problem      Hyperlipidemia      Obesity, unspecified      PCOS (polycystic ovarian syndrome)      Unspecified essential hypertension       Past Surgical History:   Procedure Laterality Date      SECTION N/A 2015    Procedure:  SECTION;  Surgeon: Meena Arnold MD;  Location:  L+D     NO HISTORY OF SURGERY         ROS:  Review of Systems     Constitutional:  Positive for fatigue, night sweats and recent stressors. Negative for fever, chills, weight loss, weight gain, decreased appetite, height loss, post-operative complications, incisional pain, hallucinations, increased energy, hyperactivity and confused.   HENT:  Positive for tinnitus. Negative for ear pain, hearing loss, nosebleeds, trouble swallowing, hoarse voice, mouth sores, sore throat, ear discharge, tooth pain, gum tenderness, taste disturbance, smell disturbance, hearing aid, bleeding gums, dry mouth, sinus pain, sinus congestion and neck mass.    Eyes:  Negative for double vision, pain, redness, eye pain, decreased vision, eye watering, eye bulging, eye dryness, flashing lights, spots, floaters, strabismus, tunnel vision, jaundice and eye irritation.   Respiratory:   Negative for cough, hemoptysis, sputum production, shortness of breath, wheezing, sleep disturbances due to breathing, snores loudly, respiratory pain, dyspnea on exertion, cough disturbing sleep and postural dyspnea.    Cardiovascular:  Positive for palpitations, leg swelling, hypertension and tachycardia. Negative for chest pain, dyspnea on exertion, orthopnea, claudication, fingers/toes turn blue, hypotension, syncope, history of heart murmur, chest pain on exertion, chest pain at rest, pacemaker, few scattered varicosities, leg pain, sleep disturbances due to breathing, light-headedness, exercise  "intolerance and edema.   Gastrointestinal:  Positive for heartburn, bloating and hemorrhoids. Negative for nausea, vomiting, abdominal pain, diarrhea, constipation, blood in stool, melena, rectal pain, bowel incontinence, jaundice, rectal bleeding, coffee ground emesis and change in stool.   Genitourinary:  Negative for bladder incontinence, dysuria, urgency, hematuria, flank pain, vaginal discharge, difficulty urinating, genital sores, dyspareunia, decreased libido, nocturia, voiding less frequently, arousal difficulty, abnormal vaginal bleeding, excessive menstruation, menstrual changes, hot flashes, vaginal dryness and postmenopausal bleeding.   Musculoskeletal:  Positive for myalgias and back pain. Negative for joint swelling, arthralgias, stiffness, muscle cramps, neck pain, bone pain, muscle weakness and fracture.   Skin:  Negative for nail changes, itching, poor wound healing, rash, hair changes, skin changes, acne, warts, poor wound healing, scarring, flaky skin, Raynaud's phenomenon, sensitivity to sunlight and skin thickening.   Neurological:  Negative for dizziness, tingling, tremors, speech change, seizures, loss of consciousness, weakness, light-headedness, numbness, headaches, disturbances in coordination, extremity numbness, memory loss, difficulty walking and paralysis.   Endo/Heme:  Negative for anemia, swollen glands and bruises/bleeds easily.   Psychiatric/Behavioral:  Positive for depression and decreased concentration. Negative for hallucinations, memory loss, mood swings and panic attacks.    Breast:  Negative for breast discharge, breast mass, breast pain and nipple retraction.   Endocrine:  Negative for altered temperature regulation, polyphagia, polydipsia, unwanted hair growth and change in facial hair.        OBJECTIVE:   BP (!) 139/95  Pulse 96  Ht 1.778 m (5' 10\")  Wt 115.7 kg (255 lb)  LMP 09/05/2017  Breastfeeding? No  BMI 36.59 kg/m2  EXAM:  GENERAL: healthy, alert and no " "distress  EYES: Eyes grossly normal to inspection, PERRL and conjunctivae and sclerae normal  HENT: ear canals and TM's normal, nose and mouth without ulcers or lesions  NECK: no adenopathy, no asymmetry, masses, or scars and thyroid normal to palpation  RESP: lungs clear to auscultation - no rales, rhonchi or wheezes  CV: regular rate and rhythm, normal S1 S2, no S3 or S4, no murmur, click or rub, no peripheral edema and peripheral pulses strong  ABDOMEN: soft, nontender, no hepatosplenomegaly, no masses and bowel sounds normal  MS: no gross musculoskeletal defects noted, no edema  SKIN: no suspicious lesions or rashes  NEURO: Normal strength and tone, mentation intact and speech normal  PSYCH: mentation appears normal, affect normal/bright    ASSESSMENT/PLAN:   1. Routine general medical examination at a health care facility  Patient is up to date on cervical cancer screening. Recommend lipid screening as well as screening for diabetes.   - Basic Metabolic Panel; Future  - Lipid Profile; Future    2. Benign essential hypertension  Blood pressure is elevated today. Recommend adding lisinopril to medical regimen. Possible side effects were discussed with patient. Will follow up in 2-3 weeks to assess blood pressure response. Patient is in agreement with the plan.  - lisinopril (PRINIVIL/ZESTRIL) 10 MG tablet; Take 1 tablet (10 mg) by mouth daily  Dispense: 90 tablet; Refill: 1  - CBC with Platelets; Future  - TSH with free T4 reflex; Future    COUNSELING:   Reviewed preventive health counseling, as reflected in patient instructions       Regular exercise       Healthy diet/nutrition       Vision screening         reports that she has never smoked. She has never used smokeless tobacco.    Estimated body mass index is 36.59 kg/(m^2) as calculated from the following:    Height as of this encounter: 1.778 m (5' 10\").    Weight as of this encounter: 115.7 kg (255 lb).         Counseling Resources:  ATP IV " Guidelines  Pooled Cohorts Equation Calculator  Breast Cancer Risk Calculator  FRAX Risk Assessment  ICSI Preventive Guidelines  Dietary Guidelines for Americans, 2010  [employer]'s MyPlate  ASA Prophylaxis  Lung CA Screening    Kenyatta Del Castillo MD  [facility]

## 2017-09-20 NOTE — MR AVS SNAPSHOT
After Visit Summary   9/20/2017    Holli Saxena    MRN: [id number]           Patient Information     Date Of Birth          1975        Visit Information        Provider Department      9/20/2017 1:20 PM Kenyatta Del Castillo MD [facility]         Today's Diagnoses     Benign essential hypertension    -  1    Routine general medical examination at a health care facility          Care Instructions      Preventive Health Recommendations  Female Ages 40 to 49    Yearly exam:     See your health care provider every year in order to  1. Review health changes.   2. Discuss preventive care.    3. Review your medicines if your doctor prescribed any.      Get a Pap test every three years (unless you have an abnormal result and your provider advises testing more often).      If you get Pap tests with HPV test, you only need to test every 5 years, unless you have an abnormal result. You do not need a Pap test if your uterus was removed (hysterectomy) and you have not had cancer.      You should be tested each year for STDs (sexually transmitted diseases), if you're at risk.       Ask your doctor if you should have a mammogram.      Have a colonoscopy (test for colon cancer) if someone in your family has had colon cancer or polyps before age 50.       Have a cholesterol test every 5 years.       Have a diabetes test (fasting glucose) after age 45. If you are at risk for diabetes, you should have this test every 3 years.    Shots: Get a flu shot each year. Get a tetanus shot every 10 years.     Nutrition:     Eat at least 5 servings of fruits and vegetables each day.    Eat whole-grain bread, whole-wheat pasta and brown rice instead of white grains and rice.    Talk to your provider about Calcium and Vitamin D.     Lifestyle    Exercise at least 150 minutes a week (an average of 30 minutes a day, 5 days a week). This will help you control your weight and prevent disease.    Limit  alcohol to one drink per day.    No smoking.     Wear sunscreen to prevent skin cancer.    See your dentist every six months for an exam and cleaning.          Follow-ups after your visit        Your next 10 appointments already scheduled     Oct 09, 2017  9:40 AM CDT   Return Visit with Kenyatta Del Castillo MD   [facility]  ([facility])    [facility]  [address]rd [address]  [facility]  [facility] [address] [phone]            Nov 28, 2017  1:00 PM CST   (Arrive by 12:45 PM)   Return Visit with Umu Mahoney MD   [facility] ([facility])    [address]  4th Floor  [facility] [address] [phone]              Who to contact     Please call your clinic at [phone] to:    Ask questions about your health    Make or cancel appointments    Discuss your medicines    Learn about your test results    Speak to your doctor   If you have compliments or concerns about an experience at your clinic, or if you wish to file a complaint, please contact [facility] Patient Relations at [phone] or email us at [email or web address]         Additional Information About Your Visit        [employer] Information     [employer] gives you secure access to your electronic health record. If you see a primary care provider, you can also send messages to your care team and make appointments. If you have questions, please call your primary care clinic.  If you do not have a primary care provider, please call [phone] and they will assist you.      [employer] One is an electronic gateway that provides easy, online access to your medical records. With [employer], you can request a clinic appointment, read your test results, renew a prescription or communicate with your care team.     To access your existing account, please contact your [facility] or  "call [phone] for assistance.        Care EveryWhere ID     This is your Care EveryWhere ID. This could be used by other organizations to access your [facility] medical records  [id number]        Your Vitals Were     Pulse Height Last Period Breastfeeding? BMI (Body Mass Index)       96 1.778 m (5' 10\") 09/05/2017 No 36.59 kg/m2        Blood Pressure from Last 3 Encounters:   09/20/17 (!) 139/95   05/08/17 (!) 136/94   01/23/17 (!) 141/99    Weight from Last 3 Encounters:   09/20/17 115.7 kg (255 lb)   05/08/17 112.8 kg (248 lb 11.2 oz)   01/23/17 111.6 kg (246 lb)                 Today's Medication Changes          These changes are accurate as of: 9/20/17 11:59 PM.  If you have any questions, ask your nurse or doctor.               Start taking these medicines.        Dose/Directions    lisinopril 10 MG tablet   Commonly known as:  PRINIVIL/ZESTRIL   Used for:  Benign essential hypertension   Started by:  Kenyatta Del Castillo MD        Dose:  10 mg   Take 1 tablet (10 mg) by mouth daily   Quantity:  90 tablet   Refills:  1         Stop taking these medicines if you haven't already. Please contact your care team if you have questions.     norethindrone 0.35 MG per tablet   Commonly known as:  MICRONOR   Stopped by:  Kenyatta Del Castillo MD                Where to get your medicines      These medications were sent to [address] [address] [address] [address]    Hours:  Tech issues with their phone system Phone:  [phone]     lisinopril 10 MG tablet                Primary Care Provider Office Phone # Fax #    Kenyatta Del Castillo [phone]       [facility] [address] [facility] [address]        Equal Access to Services     [facility] MARILYN AH: Richard Peña, сергей luqwilla, qaboris ward. So [facility] [phone].    ATENCIÓN: Si oj nur, " tiene a cormier disposición servicios gratuitos de asistencia lingüística. Sid luna [phone].    We comply with applicable federal civil rights laws and [place] laws. We do not discriminate on the basis of race, color, national origin, age, disability sex, sexual orientation or gender identity.            Thank you!     Thank you for choosing [facility]   for your care. Our goal is always to provide you with excellent care. Hearing back from our patients is one way we can continue to improve our services. Please take a few minutes to complete the written survey that you may receive in the mail after your visit with us. Thank you!             Your Updated Medication List - Protect others around you: Learn how to safely use, store and throw away your medicines at www.disposemymeds.org.          This list is accurate as of: 9/20/17 11:59 PM.  Always use your most recent med list.                   Brand Name Dispense Instructions for use Diagnosis    aspirin 81 MG EC tablet     90 tablet    Take 1 tablet (81 mg) by mouth daily    Benign essential hypertension       buPROPion 150 MG 12 hr tablet    WELLBUTRIN SR    180 tablet    Take 1 tablet (150 mg) by mouth 2 times daily    Morbid obesity due to excess calories (H)       FLAXSEED OIL PO      Take as directed        lisinopril 10 MG tablet    PRINIVIL/ZESTRIL    90 tablet    Take 1 tablet (10 mg) by mouth daily    Benign essential hypertension       MULTIVITAMINS PO      Take 1 tablet by mouth daily.        naltrexone 50 MG tablet    DEPADE;REVIA    90 tablet    Take 1 tablet (50 mg) by mouth daily KEEP APPOINTMENT FOR FUTURE REFILLS    Morbid obesity due to excess calories (H)       NIFEdipine ER osmotic 90 MG 24 hr tablet    PROCARDIA XL    90 tablet    Take 1 tablet (90 mg) by mouth daily    Benign essential hypertension

## 2017-09-21 ASSESSMENT — ANXIETY QUESTIONNAIRES: GAD7 TOTAL SCORE: 2

## 2017-09-24 ASSESSMENT — ENCOUNTER SYMPTOMS
SWOLLEN GLANDS: 0
LEG PAIN: 0
DECREASED APPETITE: 0
LIGHT-HEADEDNESS: 0
WHEEZING: 0
HOARSE VOICE: 0
NAUSEA: 0
MUSCLE WEAKNESS: 0
RESPIRATORY PAIN: 0
ARTHRALGIAS: 0
RECTAL PAIN: 0
DIARRHEA: 0
SPUTUM PRODUCTION: 0
VOMITING: 0
SINUS CONGESTION: 0
SLEEP DISTURBANCES DUE TO BREATHING: 0
NIGHT SWEATS: 1
HEMOPTYSIS: 0
DISTURBANCES IN COORDINATION: 0
DYSPNEA ON EXERTION: 0
EXTREMITY NUMBNESS: 0
BACK PAIN: 1
HEADACHES: 0
HOT FLASHES: 0
WEIGHT LOSS: 0
COUGH DISTURBING SLEEP: 0
RECTAL BLEEDING: 0
EXERCISE INTOLERANCE: 0
ORTHOPNEA: 0
SYNCOPE: 0
HYPOTENSION: 0
FATIGUE: 1
DECREASED LIBIDO: 0
EYE IRRITATION: 0
STIFFNESS: 0
HYPERTENSION: 1
CHILLS: 0
SORE THROAT: 0
DOUBLE VISION: 0
BLOOD IN STOOL: 0
MYALGIAS: 1
PARALYSIS: 0
HALLUCINATIONS: 0
NUMBNESS: 0
BREAST PAIN: 0
BLOATING: 1
NAIL CHANGES: 0
NECK PAIN: 0
MEMORY LOSS: 0
TASTE DISTURBANCE: 0
EYE WATERING: 0
TINGLING: 0
FEVER: 0
CLAUDICATION: 0
PANIC: 0
NECK MASS: 0
COUGH: 0
WEIGHT GAIN: 0
POLYDIPSIA: 0
DYSURIA: 0
LOSS OF CONSCIOUSNESS: 0
SINUS PAIN: 0
SPEECH CHANGE: 0
BOWEL INCONTINENCE: 0
SMELL DISTURBANCE: 0
WEAKNESS: 0
DECREASED CONCENTRATION: 1
JOINT SWELLING: 0
MUSCLE CRAMPS: 0
LEG SWELLING: 1
INSOMNIA: 0
TROUBLE SWALLOWING: 0
HEMATURIA: 0
DIFFICULTY URINATING: 0
HEARTBURN: 1
SHORTNESS OF BREATH: 0
TACHYCARDIA: 1
EYE PAIN: 0
POLYPHAGIA: 0
ALTERED TEMPERATURE REGULATION: 0
POOR WOUND HEALING: 0
SNORES LOUDLY: 0
INCREASED ENERGY: 0
CONSTIPATION: 0
JAUNDICE: 0
ABDOMINAL PAIN: 0
DEPRESSION: 1
BREAST MASS: 0
PALPITATIONS: 1
EYE REDNESS: 0
FLANK PAIN: 0
DIZZINESS: 0
SEIZURES: 0
BRUISES/BLEEDS EASILY: 0
TREMORS: 0
SKIN CHANGES: 0
POSTURAL DYSPNEA: 0
NERVOUS/ANXIOUS: 1

## 2017-10-11 ENCOUNTER — OFFICE VISIT (OUTPATIENT)
Dept: INTERNAL MEDICINE | Facility: CLINIC | Age: 42
End: 2017-10-11
Attending: INTERNAL MEDICINE
Payer: COMMERCIAL

## 2017-10-11 VITALS
BODY MASS INDEX: 36.36 KG/M2 | HEART RATE: 96 BPM | SYSTOLIC BLOOD PRESSURE: 135 MMHG | TEMPERATURE: 98 F | WEIGHT: 253.4 LBS | DIASTOLIC BLOOD PRESSURE: 93 MMHG

## 2017-10-11 DIAGNOSIS — Z00.00 ROUTINE GENERAL MEDICAL EXAMINATION AT A HEALTH CARE FACILITY: ICD-10-CM

## 2017-10-11 DIAGNOSIS — I10 BENIGN ESSENTIAL HYPERTENSION: Primary | ICD-10-CM

## 2017-10-11 DIAGNOSIS — I10 BENIGN ESSENTIAL HYPERTENSION: ICD-10-CM

## 2017-10-11 LAB
ANION GAP SERPL CALCULATED.3IONS-SCNC: 7 MMOL/L (ref 3–14)
BUN SERPL-MCNC: 8 MG/DL (ref 7–30)
CALCIUM SERPL-MCNC: 9 MG/DL (ref 8.5–10.1)
CHLORIDE SERPL-SCNC: 106 MMOL/L (ref 94–109)
CHOLEST SERPL-MCNC: 218 MG/DL
CO2 SERPL-SCNC: 26 MMOL/L (ref 20–32)
CREAT SERPL-MCNC: 0.72 MG/DL (ref 0.52–1.04)
ERYTHROCYTE [DISTWIDTH] IN BLOOD BY AUTOMATED COUNT: 12.1 % (ref 10–15)
GFR SERPL CREATININE-BSD FRML MDRD: 89 ML/MIN/1.7M2
GLUCOSE SERPL-MCNC: 114 MG/DL (ref 70–99)
HCT VFR BLD AUTO: 45.3 % (ref 35–47)
HDLC SERPL-MCNC: 42 MG/DL
HGB BLD-MCNC: 15.6 G/DL (ref 11.7–15.7)
LDLC SERPL CALC-MCNC: 145 MG/DL
MCH RBC QN AUTO: 30.9 PG (ref 26.5–33)
MCHC RBC AUTO-ENTMCNC: 34.4 G/DL (ref 31.5–36.5)
MCV RBC AUTO: 90 FL (ref 78–100)
NONHDLC SERPL-MCNC: 176 MG/DL
PLATELET # BLD AUTO: 378 10E9/L (ref 150–450)
POTASSIUM SERPL-SCNC: 4.1 MMOL/L (ref 3.4–5.3)
RBC # BLD AUTO: 5.05 10E12/L (ref 3.8–5.2)
SODIUM SERPL-SCNC: 139 MMOL/L (ref 133–144)
TRIGL SERPL-MCNC: 155 MG/DL
TSH SERPL DL<=0.005 MIU/L-ACNC: 2.72 MU/L (ref 0.4–4)
WBC # BLD AUTO: 8.3 10E9/L (ref 4–11)

## 2017-10-11 PROCEDURE — 84443 ASSAY THYROID STIM HORMONE: CPT | Performed by: INTERNAL MEDICINE

## 2017-10-11 PROCEDURE — 85027 COMPLETE CBC AUTOMATED: CPT | Performed by: INTERNAL MEDICINE

## 2017-10-11 PROCEDURE — 80048 BASIC METABOLIC PNL TOTAL CA: CPT | Performed by: INTERNAL MEDICINE

## 2017-10-11 PROCEDURE — 99212 OFFICE O/P EST SF 10 MIN: CPT | Mod: ZF

## 2017-10-11 PROCEDURE — 36415 COLL VENOUS BLD VENIPUNCTURE: CPT | Performed by: INTERNAL MEDICINE

## 2017-10-11 PROCEDURE — 80061 LIPID PANEL: CPT | Performed by: INTERNAL MEDICINE

## 2017-10-11 ASSESSMENT — ANXIETY QUESTIONNAIRES
1. FEELING NERVOUS, ANXIOUS, OR ON EDGE: SEVERAL DAYS
GAD7 TOTAL SCORE: 3
6. BECOMING EASILY ANNOYED OR IRRITABLE: SEVERAL DAYS
5. BEING SO RESTLESS THAT IT IS HARD TO SIT STILL: NOT AT ALL
7. FEELING AFRAID AS IF SOMETHING AWFUL MIGHT HAPPEN: NOT AT ALL
3. WORRYING TOO MUCH ABOUT DIFFERENT THINGS: SEVERAL DAYS
2. NOT BEING ABLE TO STOP OR CONTROL WORRYING: NOT AT ALL

## 2017-10-11 ASSESSMENT — PATIENT HEALTH QUESTIONNAIRE - PHQ9: 5. POOR APPETITE OR OVEREATING: NOT AT ALL

## 2017-10-11 NOTE — PROGRESS NOTES
HPI  Patient is here for follow up on hypertension. She reports that she has been feeling well. She denies new problems or medication-related side effects.     Review of Systems     Constitutional:  Negative for chills and fatigue.   Eyes:  Negative for decreased vision.   Respiratory:   Negative for cough and dyspnea on exertion.    Cardiovascular:  Negative for chest pain and dyspnea on exertion.   Gastrointestinal:  Negative for abdominal pain.   Neurological:  Negative for dizziness, disturbances in coordination and difficulty walking.   Psychiatric/Behavioral:  Negative for depression, decreased concentration, mood swings and panic attacks.      Current Outpatient Prescriptions   Medication     lisinopril (PRINIVIL/ZESTRIL) 10 MG tablet     NIFEdipine ER osmotic (PROCARDIA XL) 90 MG 24 hr tablet     naltrexone (DEPADE;REVIA) 50 MG tablet     buPROPion (WELLBUTRIN SR) 150 MG 12 hr tablet     aspirin 81 MG EC tablet     Flaxseed, Linseed, (FLAXSEED OIL PO)     Multiple Vitamin (MULTIVITAMINS PO)     No current facility-administered medications for this visit.      Vitals:    10/11/17 0925 10/11/17 0926 10/11/17 0927   BP: 135/90 (!) 126/91 (!) 135/93   Pulse: 99 91 96   Temp: 98  F (36.7  C)  98  F (36.7  C)   TempSrc: Oral  Oral   Weight: 114.9 kg (253 lb 6.4 oz)         Physical Exam   Constitutional: She is well-developed, well-nourished, and in no distress.   HENT:   Head: Normocephalic and atraumatic.   Eyes: Conjunctivae are normal.   Cardiovascular: Normal rate.    Pulmonary/Chest: Effort normal.   Skin: Skin is warm and dry.   Psychiatric: Memory, affect and judgment normal.   Vitals reviewed.    Assessment and Plan:  Holli was seen today for recheck.    Diagnoses and all orders for this visit:    Benign essential hypertension. Blood pressure is elevated today. Discussed management options, recommend increasing the dose of lisinopril to 20 mg. Will follow up in 2-3 weeks for blood pressure check. Patient  is in agreement with the plan.       Total time spent 15 minutes.  More than 50% of the time spent with Ms. Saxena on counseling / coordinating her care    Kenyatta Del Castillo MD

## 2017-10-11 NOTE — MR AVS SNAPSHOT
After Visit Summary   10/11/2017    Holli Saxena    MRN: [id number]           Patient Information     Date Of Birth          1975        Visit Information        Provider Department      10/11/2017 9:20 AM Kenyatta Del Castillo MD [facility]         Today's Diagnoses     Benign essential hypertension    -  1       Follow-ups after your visit        Your next 10 appointments already scheduled     Oct 25, 2017 12:40 PM CDT   Return Visit with Kenyatta Del Castillo MD   [facility]  ([facility])    [facility]  3rd [facility] [address]  [address]  [address] [id number] [phone]            Nov 28, 2017  1:00 PM CST   (Arrive by 12:45 PM)   Return Visit with Umu Mahoney MD   [facility] ([facility])    [address]  4th [facility] [address] [phone]              Who to contact     Please call your clinic at [phone] to:    Ask questions about your health    Make or cancel appointments    Discuss your medicines    Learn about your test results    Speak to your doctor   If you have compliments or concerns about an experience at your clinic, or if you wish to file a complaint, please contact [facility] Patient Relations at [phone] or email us at [email or web address]         Additional Information About Your Visit        MyChart Information     [employer] gives you secure access to your electronic health record. If you see a primary care provider, you can also send messages to your care team and make appointments. If you have questions, please call your primary care clinic.  If you do not have a primary care provider, please call [phone] and they will assist you.      [employer] is an electronic gateway that provides easy, online access to your medical records. With [employer], you can request a  clinic appointment, read your test results, renew a prescription or communicate with your care team.     To access your existing account, please contact your [facility] or call [phone] for assistance.        Care EveryWhere ID     This is your Care EveryWhere ID. This could be used by other organizations to access your [facility] medical records  [id number]        Your Vitals Were     Pulse Temperature Last Period Breastfeeding? BMI (Body Mass Index)       96 98  F (36.7  C) (Oral) 09/05/2017 No 36.36 kg/m2        Blood Pressure from Last 3 Encounters:   10/11/17 (!) 135/93   09/20/17 (!) 139/95   05/08/17 (!) 136/94    Weight from Last 3 Encounters:   10/11/17 114.9 kg (253 lb 6.4 oz)   09/20/17 115.7 kg (255 lb)   05/08/17 112.8 kg (248 lb 11.2 oz)              Today, you had the following     No orders found for display       Primary Care Provider Office Phone # Fax #    Kenyatta Jose Del Castillo [phone]       [facility] [address] [facility] [address]        Equal Access to Services     [facility]A [facility] : Hadii jesenia garcias hadasho Someaghanali, waaxda luqadaha, qaybta kaalmada adealessandrada, boris townsend. So [facility] [phone].    ATENCIÓN: Si habla español, tiene a cormier disposición servicios gratuitos de asistencia lingüística. Llame al [phone].    We comply with applicable federal civil rights laws and [place] laws. We do not discriminate on the basis of race, color, national origin, age, disability, sex, sexual orientation, or gender identity.            Thank you!     Thank you for choosing [facility]   for your care. Our goal is always to provide you with excellent care. Hearing back from our patients is one way we can continue to improve our services. Please take a few minutes to complete the written survey that you may receive in the mail after your visit with us. Thank you!             Your  Updated Medication List - Protect others around you: Learn how to safely use, store and throw away your medicines at www.disposemymeds.org.          This list is accurate as of: 10/11/17 11:59 PM.  Always use your most recent med list.                   Brand Name Dispense Instructions for use Diagnosis    aspirin 81 MG EC tablet     90 tablet    Take 1 tablet (81 mg) by mouth daily    Benign essential hypertension       buPROPion 150 MG 12 hr tablet    WELLBUTRIN SR    180 tablet    Take 1 tablet (150 mg) by mouth 2 times daily    Morbid obesity due to excess calories (H)       FLAXSEED OIL PO      Take as directed        lisinopril 10 MG tablet    PRINIVIL/ZESTRIL    90 tablet    Take 1 tablet (10 mg) by mouth daily    Benign essential hypertension       MULTIVITAMINS PO      Take 1 tablet by mouth daily.        naltrexone 50 MG tablet    DEPADE;REVIA    90 tablet    Take 1 tablet (50 mg) by mouth daily KEEP APPOINTMENT FOR FUTURE REFILLS    Morbid obesity due to excess calories (H)       NIFEdipine ER osmotic 90 MG 24 hr tablet    PROCARDIA XL    90 tablet    Take 1 tablet (90 mg) by mouth daily    Benign essential hypertension

## 2017-10-11 NOTE — LETTER
10/11/2017     RE: Holli Saxena  [id number]  [facility] [address]     Dear Colleague,    Thank you for referring your patient, Holli Saxena, to the [facility]  at [facility] Please see a copy of my visit note below.    HPI  Patient is here for follow up on hypertension. She reports that she has been feeling well. She denies new problems or medication-related side effects.     Review of Systems     Constitutional:  Negative for chills and fatigue.   Eyes:  Negative for decreased vision.   Respiratory:   Negative for cough and dyspnea on exertion.    Cardiovascular:  Negative for chest pain and dyspnea on exertion.   Gastrointestinal:  Negative for abdominal pain.   Neurological:  Negative for dizziness, disturbances in coordination and difficulty walking.   Psychiatric/Behavioral:  Negative for depression, decreased concentration, mood swings and panic attacks.      Current Outpatient Prescriptions   Medication     lisinopril (PRINIVIL/ZESTRIL) 10 MG tablet     NIFEdipine ER osmotic (PROCARDIA XL) 90 MG 24 hr tablet     naltrexone (DEPADE;REVIA) 50 MG tablet     buPROPion (WELLBUTRIN SR) 150 MG 12 hr tablet     aspirin 81 MG EC tablet     Flaxseed, Linseed, (FLAXSEED OIL PO)     Multiple Vitamin (MULTIVITAMINS PO)     No current facility-administered medications for this visit.      Vitals:    10/11/17 0925 10/11/17 0926 10/11/17 0927   BP: 135/90 (!) 126/91 (!) 135/93   Pulse: 99 91 96   Temp: 98  F (36.7  C)  98  F (36.7  C)   TempSrc: Oral  Oral   Weight: 114.9 kg (253 lb 6.4 oz)         Physical Exam   Constitutional: She is well-developed, well-nourished, and in no distress.   HENT:   Head: Normocephalic and atraumatic.   Eyes: Conjunctivae are normal.   Cardiovascular: Normal rate.    Pulmonary/Chest: Effort normal.   Skin: Skin is warm and dry.   Psychiatric: Memory, affect and judgment normal.   Vitals reviewed.    Assessment and  Plan:  Holli was seen today for recheck.    Diagnoses and all orders for this visit:    Benign essential hypertension. Blood pressure is elevated today. Discussed management options, recommend increasing the dose of lisinopril to 20 mg. Will follow up in 2-3 weeks for blood pressure check. Patient is in agreement with the plan.       Total time spent 15 minutes.  More than 50% of the time spent with Ms. Saxena on counseling / coordinating her care    Kenyatta Del Castillo MD

## 2017-10-12 ASSESSMENT — ANXIETY QUESTIONNAIRES: GAD7 TOTAL SCORE: 3

## 2017-10-16 ASSESSMENT — ENCOUNTER SYMPTOMS
COUGH: 0
CHILLS: 0
DIZZINESS: 0
NERVOUS/ANXIOUS: 0
DYSPNEA ON EXERTION: 0
FATIGUE: 0
ABDOMINAL PAIN: 0
PANIC: 0
DECREASED CONCENTRATION: 0
DISTURBANCES IN COORDINATION: 0
INSOMNIA: 0
DEPRESSION: 0

## 2017-10-16 ASSESSMENT — ANXIETY QUESTIONNAIRES
5. BEING SO RESTLESS THAT IT IS HARD TO SIT STILL: NOT AT ALL
2. NOT BEING ABLE TO STOP OR CONTROL WORRYING: NOT AT ALL
6. BECOMING EASILY ANNOYED OR IRRITABLE: SEVERAL DAYS
7. FEELING AFRAID AS IF SOMETHING AWFUL MIGHT HAPPEN: NOT AT ALL
GAD7 TOTAL SCORE: 3
3. WORRYING TOO MUCH ABOUT DIFFERENT THINGS: SEVERAL DAYS
1. FEELING NERVOUS, ANXIOUS, OR ON EDGE: SEVERAL DAYS

## 2017-10-16 ASSESSMENT — PATIENT HEALTH QUESTIONNAIRE - PHQ9
5. POOR APPETITE OR OVEREATING: NOT AT ALL
SUM OF ALL RESPONSES TO PHQ QUESTIONS 1-9: 0

## 2017-10-25 ENCOUNTER — OFFICE VISIT (OUTPATIENT)
Dept: INTERNAL MEDICINE | Facility: CLINIC | Age: 42
End: 2017-10-25
Attending: INTERNAL MEDICINE
Payer: COMMERCIAL

## 2017-10-25 ENCOUNTER — APPOINTMENT (OUTPATIENT)
Dept: LAB | Facility: CLINIC | Age: 42
End: 2017-10-25
Attending: INTERNAL MEDICINE
Payer: COMMERCIAL

## 2017-10-25 VITALS
DIASTOLIC BLOOD PRESSURE: 76 MMHG | HEART RATE: 72 BPM | BODY MASS INDEX: 36.3 KG/M2 | WEIGHT: 253 LBS | SYSTOLIC BLOOD PRESSURE: 113 MMHG

## 2017-10-25 DIAGNOSIS — Z23 NEED FOR IMMUNIZATION AGAINST INFLUENZA: Primary | ICD-10-CM

## 2017-10-25 DIAGNOSIS — I10 BENIGN ESSENTIAL HYPERTENSION: ICD-10-CM

## 2017-10-25 LAB
ANION GAP SERPL CALCULATED.3IONS-SCNC: 5 MMOL/L (ref 3–14)
BUN SERPL-MCNC: 12 MG/DL (ref 7–30)
CALCIUM SERPL-MCNC: 9.2 MG/DL (ref 8.5–10.1)
CHLORIDE SERPL-SCNC: 106 MMOL/L (ref 94–109)
CO2 SERPL-SCNC: 28 MMOL/L (ref 20–32)
CREAT SERPL-MCNC: 0.78 MG/DL (ref 0.52–1.04)
GFR SERPL CREATININE-BSD FRML MDRD: 81 ML/MIN/1.7M2
GLUCOSE SERPL-MCNC: 87 MG/DL (ref 70–99)
POTASSIUM SERPL-SCNC: 4 MMOL/L (ref 3.4–5.3)
SODIUM SERPL-SCNC: 139 MMOL/L (ref 133–144)

## 2017-10-25 PROCEDURE — 90471 IMMUNIZATION ADMIN: CPT | Mod: ZF

## 2017-10-25 PROCEDURE — 36415 COLL VENOUS BLD VENIPUNCTURE: CPT | Performed by: INTERNAL MEDICINE

## 2017-10-25 PROCEDURE — 25000128 H RX IP 250 OP 636: Mod: ZF

## 2017-10-25 PROCEDURE — G0008 ADMIN INFLUENZA VIRUS VAC: HCPCS

## 2017-10-25 PROCEDURE — 80048 BASIC METABOLIC PNL TOTAL CA: CPT | Performed by: INTERNAL MEDICINE

## 2017-10-25 PROCEDURE — 90686 IIV4 VACC NO PRSV 0.5 ML IM: CPT | Mod: ZF

## 2017-10-25 PROCEDURE — 99212 OFFICE O/P EST SF 10 MIN: CPT | Mod: ZF

## 2017-10-25 RX ORDER — LISINOPRIL 20 MG/1
20 TABLET ORAL DAILY
Qty: 90 TABLET | Refills: 3 | Status: SHIPPED | OUTPATIENT
Start: 2017-10-25 | End: 2018-10-07

## 2017-10-25 ASSESSMENT — PAIN SCALES - GENERAL: PAINLEVEL: NO PAIN (0)

## 2017-10-25 NOTE — MR AVS SNAPSHOT
After Visit Summary   10/25/2017    Holli Saxena    MRN: [id number]           Patient Information     Date Of Birth          1975        Visit Information        Provider Department      10/25/2017 12:40 PM Kenyatta Del Castillo MD [facility]         Today's Diagnoses     Need for immunization against influenza    -  1    Benign essential hypertension           Follow-ups after your visit        Your next 10 appointments already scheduled     Nov 28, 2017  1:00 PM CST   (Arrive by 12:45 PM)   Return Visit with Umu Mahoney MD   [facility] ([facility])    [address] [place] [address] [phone]              Who to contact     Please call your clinic at [phone] to:    Ask questions about your health    Make or cancel appointments    Discuss your medicines    Learn about your test results    Speak to your doctor   If you have compliments or concerns about an experience at your clinic, or if you wish to file a complaint, please contact [facility] Patient Relations at [phone] or email us at [email or web address]         Additional Information About Your Visit        MyChart Information     Embert gives you secure access to your electronic health record. If you see a primary care provider, you can also send messages to your care team and make appointments. If you have questions, please call your primary care clinic.  If you do not have a primary care provider, please call [phone] and they will assist you.      Embert is an electronic gateway that provides easy, online access to your medical records. With [employer], you can request a clinic appointment, read your test results, renew a prescription or communicate with your care team.     To access your existing account, please contact your [facility] or call  [phone] for assistance.        Care EveryWhere ID     This is your Care EveryWhere ID. This could be used by other organizations to access your [place] medical records  [id number]        Your Vitals Were     Pulse Last Period Breastfeeding? BMI (Body Mass Index)          72 09/09/2017 No 36.3 kg/m2         Blood Pressure from Last 3 Encounters:   10/25/17 113/76   10/11/17 (!) 135/93   09/20/17 (!) 139/95    Weight from Last 3 Encounters:   10/25/17 114.8 kg (253 lb)   10/11/17 114.9 kg (253 lb 6.4 oz)   09/20/17 115.7 kg (255 lb)              We Performed the Following     Basic Metabolic Panel     HC FLU VAC PRESRV FREE QUAD SPLIT VIR 3+YRS IM          Today's Medication Changes          These changes are accurate as of: 10/25/17 11:59 PM.  If you have any questions, ask your nurse or doctor.               These medicines have changed or have updated prescriptions.        Dose/Directions    lisinopril 20 MG tablet   Commonly known as:  PRINIVIL/ZESTRIL   This may have changed:    - medication strength  - how much to take   Used for:  Benign essential hypertension   Changed by:  Kenyatta Del Castillo MD        Dose:  20 mg   Take 1 tablet (20 mg) by mouth daily   Quantity:  90 tablet   Refills:  3            Where to get your medicines      These medications were sent to [facility] [address] [place] [address] [address]    Hours:  Tech issues with their phone system Phone:  [phone]     lisinopril 20 MG tablet                Primary Care Provider Office Phone # Fax #    Kenyatta Del Castillo [phone]       [facility] [address]  [facility] [address]        Equal Access to Services     ANYA SANDERS AH: Richard Peña, wamargaretda luqadaha, qaberny kaalmada mark, boris townsend. So [facility] [phone].    ATENCIÓN: Si habla español, tiene a cormier disposición servicios gratuitos de  asistencia lingüística. Sid al [phone].    We comply with applicable federal civil rights laws and [place] laws. We do not discriminate on the basis of race, color, national origin, age, disability, sex, sexual orientation, or gender identity.            Thank you!     Thank you for choosing [facility]   for your care. Our goal is always to provide you with excellent care. Hearing back from our patients is one way we can continue to improve our services. Please take a few minutes to complete the written survey that you may receive in the mail after your visit with us. Thank you!             Your Updated Medication List - Protect others around you: Learn how to safely use, store and throw away your medicines at www.disposemymeds.org.          This list is accurate as of: 10/25/17 11:59 PM.  Always use your most recent med list.                   Brand Name Dispense Instructions for use Diagnosis    aspirin 81 MG EC tablet     90 tablet    Take 1 tablet (81 mg) by mouth daily    Benign essential hypertension       buPROPion 150 MG 12 hr tablet    WELLBUTRIN SR    180 tablet    Take 1 tablet (150 mg) by mouth 2 times daily    Morbid obesity due to excess calories (H)       FLAXSEED OIL PO      Take as directed        lisinopril 20 MG tablet    PRINIVIL/ZESTRIL    90 tablet    Take 1 tablet (20 mg) by mouth daily    Benign essential hypertension       MULTIVITAMINS PO      Take 1 tablet by mouth daily.        naltrexone 50 MG tablet    DEPADE;REVIA    90 tablet    Take 1 tablet (50 mg) by mouth daily KEEP APPOINTMENT FOR FUTURE REFILLS    Morbid obesity due to excess calories (H)       NIFEdipine ER osmotic 90 MG 24 hr tablet    PROCARDIA XL    90 tablet    Take 1 tablet (90 mg) by mouth daily    Benign essential hypertension

## 2017-10-25 NOTE — LETTER
10/25/2017       RE: Holli Saxena  [address]  [facility] [place] [address]     Dear Colleague,    Thank you for referring your patient, Holli Saxena, to the [facility]  at [facility]. Please see a copy of my visit note below.    HPI  Patient is here for follow up on hypertension. She reports that she has been able to tolerate the medication well. She denies side effects.     Review of Systems     Constitutional:  Negative for fever, chills and fatigue.   Gastrointestinal:  Negative for abdominal pain, diarrhea and constipation.   Musculoskeletal:  Negative for back pain, arthralgias and bone pain.   Skin:  Positive for Raynaud's phenomenon. Negative for itching, poor wound healing, rash, acne and poor wound healing.   Neurological:  Negative for dizziness and extremity numbness.   Endo/Heme:  Negative for anemia, swollen glands and bruises/bleeds easily.   Psychiatric/Behavioral:  Negative for depression, decreased concentration, mood swings and panic attacks.    Breast:  Negative for breast discharge, breast mass, breast pain and nipple retraction.   Endocrine:  Negative for altered temperature regulation, polyphagia, polydipsia, unwanted hair growth and change in facial hair.    Current Outpatient Prescriptions   Medication     lisinopril (PRINIVIL/ZESTRIL) 10 MG tablet     NIFEdipine ER osmotic (PROCARDIA XL) 90 MG 24 hr tablet     naltrexone (DEPADE;REVIA) 50 MG tablet     buPROPion (WELLBUTRIN SR) 150 MG 12 hr tablet     aspirin 81 MG EC tablet     Flaxseed, Linseed, (FLAXSEED OIL PO)     Multiple Vitamin (MULTIVITAMINS PO)     No current facility-administered medications for this visit.      Vitals:    10/25/17 1252 10/25/17 1253 10/25/17 1254   BP: 109/75 119/80 113/76   Pulse: 86 80 72   Weight: 114.8 kg (253 lb)           Physical Exam   Constitutional: She is oriented to person, place, and time and well-developed, well-nourished, and in  no distress.   HENT:   Head: Normocephalic and atraumatic.   Eyes: Pupils are equal, round, and reactive to light.   Cardiovascular: Normal rate.    Pulmonary/Chest: Effort normal.   Musculoskeletal: Normal range of motion.   Neurological: She is oriented to person, place, and time.   Skin: Skin is warm and dry.   Psychiatric: Mood, memory, affect and judgment normal.   Vitals reviewed.    Assessment and Plan:  Holli was seen today for recheck.    Diagnoses and all orders for this visit:    Need for immunization against influenza  -     HC FLU VAC PRESRV FREE QUAD SPLIT VIR 3+YRS IM    Benign essential hypertension. Blood pressure is at goal. Recommend to continue with current medical therapy without changes. Patient is in agreement with the plan.   -     lisinopril (PRINIVIL/ZESTRIL) 20 MG tablet; Take 1 tablet (20 mg) by mouth daily  -     Basic Metabolic Panel      Total time spent 15 minutes.  More than 50% of the time spent with Ms. Saxena on counseling / coordinating her care    Kenyatta Del Castillo MD

## 2017-10-25 NOTE — LETTER
10/27/2017         Holli Saxena   [address] [facility] [address]        Dear Ms. Saxena:      Your labs were reviewed by Kenyatta Del Castillo MD and are within acceptable ranges.  No changes are recommended.          Results for orders placed or performed in visit on 10/25/17   Basic Metabolic Panel   Result Value Ref Range    Sodium 139 133 - 144 mmol/L    Potassium 4.0 3.4 - 5.3 mmol/L    Chloride 106 94 - 109 mmol/L    Carbon Dioxide 28 20 - 32 mmol/L    Anion Gap 5 3 - 14 mmol/L    Glucose 87 70 - 99 mg/dL    Urea Nitrogen 12 7 - 30 mg/dL    Creatinine 0.78 0.52 - 1.04 mg/dL    GFR Estimate 81 >60 mL/min/1.7m2    GFR Estimate If Black >90 >60 mL/min/1.7m2    Calcium 9.2 8.5 - 10.1 mg/dL         Please note that test explanations are brief and do not reflect all diagnostic uses.  If you have any questions or concerns, please call the clinic at [phone].      Sincerely,      Joyce Conklin sent on behalf of  Kenyatta Del Castillo MD

## 2017-10-25 NOTE — PROGRESS NOTES
HPI  Patient is here for follow up on hypertension. She reports that she has been able to tolerate the medication well. She denies side effects.     Review of Systems     Constitutional:  Negative for fever, chills and fatigue.   Gastrointestinal:  Negative for abdominal pain, diarrhea and constipation.   Musculoskeletal:  Negative for back pain, arthralgias and bone pain.   Skin:  Positive for Raynaud's phenomenon. Negative for itching, poor wound healing, rash, acne and poor wound healing.   Neurological:  Negative for dizziness and extremity numbness.   Endo/Heme:  Negative for anemia, swollen glands and bruises/bleeds easily.   Psychiatric/Behavioral:  Negative for depression, decreased concentration, mood swings and panic attacks.    Breast:  Negative for breast discharge, breast mass, breast pain and nipple retraction.   Endocrine:  Negative for altered temperature regulation, polyphagia, polydipsia, unwanted hair growth and change in facial hair.    Current Outpatient Prescriptions   Medication     lisinopril (PRINIVIL/ZESTRIL) 10 MG tablet     NIFEdipine ER osmotic (PROCARDIA XL) 90 MG 24 hr tablet     naltrexone (DEPADE;REVIA) 50 MG tablet     buPROPion (WELLBUTRIN SR) 150 MG 12 hr tablet     aspirin 81 MG EC tablet     Flaxseed, Linseed, (FLAXSEED OIL PO)     Multiple Vitamin (MULTIVITAMINS PO)     No current facility-administered medications for this visit.      Vitals:    10/25/17 1252 10/25/17 1253 10/25/17 1254   BP: 109/75 119/80 113/76   Pulse: 86 80 72   Weight: 114.8 kg (253 lb)           Physical Exam   Constitutional: She is oriented to person, place, and time and well-developed, well-nourished, and in no distress.   HENT:   Head: Normocephalic and atraumatic.   Eyes: Pupils are equal, round, and reactive to light.   Cardiovascular: Normal rate.    Pulmonary/Chest: Effort normal.   Musculoskeletal: Normal range of motion.   Neurological: She is oriented to person, place, and time.   Skin: Skin is  warm and dry.   Psychiatric: Mood, memory, affect and judgment normal.   Vitals reviewed.    Assessment and Plan:  Holli was seen today for recheck.    Diagnoses and all orders for this visit:    Need for immunization against influenza  -     HC FLU VAC PRESRV FREE QUAD SPLIT VIR 3+YRS IM    Benign essential hypertension. Blood pressure is at goal. Recommend to continue with current medical therapy without changes. Patient is in agreement with the plan.   -     lisinopril (PRINIVIL/ZESTRIL) 20 MG tablet; Take 1 tablet (20 mg) by mouth daily  -     Basic Metabolic Panel      Total time spent 15 minutes.  More than 50% of the time spent with Ms. Saxena on counseling / coordinating her care    Kenyatta Del Castillo MD

## 2017-10-29 ASSESSMENT — ENCOUNTER SYMPTOMS
FATIGUE: 0
POOR WOUND HEALING: 0
BREAST PAIN: 0
ALTERED TEMPERATURE REGULATION: 0
BRUISES/BLEEDS EASILY: 0
ABDOMINAL PAIN: 0
DIARRHEA: 0
ARTHRALGIAS: 0
SWOLLEN GLANDS: 0
FEVER: 0
DECREASED CONCENTRATION: 0
CONSTIPATION: 0
BREAST MASS: 0
EXTREMITY NUMBNESS: 0
DEPRESSION: 0
BACK PAIN: 0
POLYDIPSIA: 0
CHILLS: 0
NERVOUS/ANXIOUS: 0
PANIC: 0
INSOMNIA: 0
DIZZINESS: 0
POLYPHAGIA: 0

## 2018-01-16 ENCOUNTER — OFFICE VISIT (OUTPATIENT)
Dept: ENDOCRINOLOGY | Facility: CLINIC | Age: 43
End: 2018-01-16
Payer: COMMERCIAL

## 2018-01-16 VITALS
HEIGHT: 70 IN | SYSTOLIC BLOOD PRESSURE: 130 MMHG | DIASTOLIC BLOOD PRESSURE: 90 MMHG | BODY MASS INDEX: 36.59 KG/M2 | WEIGHT: 255.6 LBS | OXYGEN SATURATION: 98 % | HEART RATE: 92 BPM

## 2018-01-16 RX ORDER — TOPIRAMATE 50 MG/1
50 TABLET, FILM COATED ORAL DAILY
Qty: 90 TABLET | Refills: 3 | Status: SHIPPED | OUTPATIENT
Start: 2018-01-16 | End: 2018-04-17

## 2018-01-16 RX ORDER — PHENTERMINE HYDROCHLORIDE 37.5 MG/1
TABLET ORAL
Qty: 16 TABLET | Refills: 3 | Status: SHIPPED | OUTPATIENT
Start: 2018-01-16 | End: 2018-04-17

## 2018-01-16 NOTE — PROGRESS NOTES
"    Return Medical Weight Management Note     Holli Saxena  MRN:  [id number]  :  1975  ALIA:  2018    Dear Abundio, Kenyatta Garcia,    I had the pleasure of seeing your patient Holli Saxena. She is a 42 year old female who I am continuing to see for treatment of obesity related to:  HTN, hyperlipidemia, and PCOS    INTERVAL HISTORY:  Weight up, emotional eating, on naltrexone and bupropion. 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    CURRENT WEIGHT:   255 lbs 9.6 oz    Wt Readings from Last 4 Encounters:   18 115.9 kg (255 lb 9.6 oz)   10/25/17 114.8 kg (253 lb)   10/11/17 114.9 kg (253 lb 6.4 oz)   17 115.7 kg (255 lb)     Height:  5' 10\"  Body Mass Index:  Body mass index is 36.67 kg/(m^2).  Vitals:  B/P: 130/90, P: 92    Initial consult weight was 225 on 7/12/10.  Weight change since last seen on 17 is up 7 pounds.   Total gain is 30 pounds.    Diet and Activity Changes Since Last Visit Reviewed With Patient 2018   I have made the following changes to my diet since my last visit: Eating more fruits, trying to eat more vegetables.   With regards to my diet, I am still struggling with: emotional eating.   For breakfast, I typically eat: toast and coffee   For lunch, I typically eat: nuts and fruit   For supper, I typically eat: meat/sweet potato/pasta/salad try to be sensible but generally end up eating too much   For snack(s), I typically eat: sugary snack, candy or cookie   I have made the following changes to my activity/exercise since my last visit: walking more   With regards to my activity/exercise, I am still struggling with: lack of energy or motivation to work out     ROS    MEDICATIONS:   Current Outpatient Prescriptions   Medication     lisinopril (PRINIVIL/ZESTRIL) 20 MG tablet     NIFEdipine ER osmotic (PROCARDIA XL) 90 MG 24 hr tablet     naltrexone (DEPADE;REVIA) 50 MG tablet     buPROPion (WELLBUTRIN SR) 150 MG " 12 hr tablet     aspirin 81 MG EC tablet     Flaxseed, Linseed, (FLAXSEED OIL PO)     Multiple Vitamin (MULTIVITAMINS PO)     No current facility-administered medications for this visit.        Weight Loss Medication History Reviewed With Patient 1/16/2018   Which weight loss medications are you currently taking on a regular basis?  -   If you are not taking a weight loss medication that was prescribed to you, please indicate why: It did not seem to be helping me   Are you having any side effects from the weight loss medication that we have prescribed you? No     ASSESSMENT:   Obesity, exacerbation  Time was identified as one barrier to getting counseling to deal with increased stress    PLAN:   Food goal: 1,400 calorie food plan to lose 1 lbs weekly without exericse    Activity goal: to keep walking at work 150 minutes weekly    Medication goal: to restart phentermine and topiramate (if blood pressure goes up temporarily while on phentermine then consider doubling lisinopril)    Therapy goal: to start counseling for stress management    Commuting goal: to start listening to Audible books such as The Volumetrics Eating Plan and management classes    FOLLOW-UP:    8 weeks.    Time: 30 min spent on evaluation, management, counseling, education, & motivational interviewing with greater than 50% of the total time was spent on counseling and coordinating care    Sincerely,    Umu Mahoney MD

## 2018-01-16 NOTE — LETTER
"2018       RE: Holli Saxena  [address]  [facility] [address]     Dear Colleague,    Thank you for referring your patient, Holli Saxena, to the [facility] at [facility]. Please see a copy of my visit note below.        Return Medical Weight Management Note     Holli Saxena  MRN:  [id number]  :  1975  ALIA:  2018    Dear Kenyatta Del Castillo,    I had the pleasure of seeing your patient Holli Saxena. She is a 42 year old female who I am continuing to see for treatment of obesity related to:  HTN, hyperlipidemia, and PCOS    INTERVAL HISTORY:  Weight up, emotional eating, on naltrexone and bupropion. 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    CURRENT WEIGHT:   255 lbs 9.6 oz    Wt Readings from Last 4 Encounters:   18 115.9 kg (255 lb 9.6 oz)   10/25/17 114.8 kg (253 lb)   10/11/17 114.9 kg (253 lb 6.4 oz)   17 115.7 kg (255 lb)     Height:  5' 10\"  Body Mass Index:  Body mass index is 36.67 kg/(m^2).  Vitals:  B/P: 130/90, P: 92    Initial consult weight was 225 on 7/12/10.  Weight change since last seen on 17 is up 7 pounds.   Total gain is 30 pounds.    Diet and Activity Changes Since Last Visit Reviewed With Patient 2018   I have made the following changes to my diet since my last visit: Eating more fruits, trying to eat more vegetables.   With regards to my diet, I am still struggling with: emotional eating.   For breakfast, I typically eat: toast and coffee   For lunch, I typically eat: nuts and fruit   For supper, I typically eat: meat/sweet potato/pasta/salad try to be sensible but generally end up eating too much   For snack(s), I typically eat: sugary snack, candy or cookie   I have made the following changes to my activity/exercise since my last visit: walking more   With regards to my activity/exercise, I am still struggling with: " lack of energy or motivation to work out     ROS    MEDICATIONS:   Current Outpatient Prescriptions   Medication     lisinopril (PRINIVIL/ZESTRIL) 20 MG tablet     NIFEdipine ER osmotic (PROCARDIA XL) 90 MG 24 hr tablet     naltrexone (DEPADE;REVIA) 50 MG tablet     buPROPion (WELLBUTRIN SR) 150 MG 12 hr tablet     aspirin 81 MG EC tablet     Flaxseed, Linseed, (FLAXSEED OIL PO)     Multiple Vitamin (MULTIVITAMINS PO)     No current facility-administered medications for this visit.        Weight Loss Medication History Reviewed With Patient 1/16/2018   Which weight loss medications are you currently taking on a regular basis?  -   If you are not taking a weight loss medication that was prescribed to you, please indicate why: It did not seem to be helping me   Are you having any side effects from the weight loss medication that we have prescribed you? No     ASSESSMENT:   Obesity, exacerbation  Time was identified as one barrier to getting counseling to deal with increased stress    PLAN:   Food goal: 1,400 calorie food plan to lose 1 lbs weekly without exericse    Activity goal: to keep walking at work 150 minutes weekly    Medication goal: to restart phentermine and topiramate (if blood pressure goes up temporarily while on phentermine then consider doubling lisinopril)    Therapy goal: to start counseling for stress management    Commuting goal: to start listening to Audible books such as The "[employer]s Eating Plan and management classes    FOLLOW-UP:    8 weeks.    Time: 30 min spent on evaluation, management, counseling, education, & motivational interviewing with greater than 50% of the total time was spent on counseling and coordinating care    Sincerely,    Umu Mahoney MD

## 2018-01-16 NOTE — PATIENT INSTRUCTIONS
Food goal: 1,400 calorie food plan to lose 1 lbs weekly without exericse    Activity goal: to keep walking at work 150 minutes weekly    Medication goal: to restart phentermine and topiramate (if blood pressure goes up temporarily while on phentermine then consider doubling lisinopril)    Therapy goal: to start counseling for stress management    Commuting goal: to start listening to [employer] books such as The Volumetrics Eating Plan and management classes

## 2018-01-16 NOTE — MR AVS SNAPSHOT
After Visit Summary   1/16/2018    Holli Saxena    MRN: [id number]           Patient Information     Date Of Birth          1975        Visit Information        Provider Department      1/16/2018 2:20 PM Umu Mahoney MD  [facility]        Today's Diagnoses     Class 2 obesity due to excess calories with serious comorbidity in adult, unspecified BMI    -  1      Care Instructions    Food goal: 1,400 calorie food plan to lose 1 lbs weekly without exericse    Activity goal: to keep walking at work 150 minutes weekly    Medication goal: to restart phentermine and topiramate (if blood pressure goes up temporarily while on phentermine then consider doubling lisinopril)    Therapy goal: to start counseling for stress management    Commuting goal: to start listening to Audible books such as The Volumetrics Eating Plan and management classes            Follow-ups after your visit        Follow-up notes from your care team     Return in about 2 months (around 3/16/2018).      Your next 10 appointments already scheduled     Jan 26, 2018  9:20 AM CST   Return Visit with Kenyatta Del Castillo MD   [facility]  ([facility])    [address] [address] [address] [phone]              Who to contact     Please call your clinic at [phone] to:    Ask questions about your health    Make or cancel appointments    Discuss your medicines    Learn about your test results    Speak to your doctor   If you have compliments or concerns about an experience at your clinic, or if you wish to file a complaint, please contact [facility] Patient Relations at [phone] or email us at [email or web address]         Additional Information About Your Visit        MyChart Information     [employer] gives you secure access to your electronic health record. If you  "see a primary care provider, you can also send messages to your care team and make appointments. If you have questions, please call your primary care clinic.  If you do not have a primary care provider, please call [phone] and they will assist you.      [employer] is an electronic gateway that provides easy, online access to your medical records. With [employer], you can request a clinic appointment, read your test results, renew a prescription or communicate with your care team.     To access your existing account, please contact your [facility] or call [phone] for assistance.        Care EveryWhere ID     This is your Care EveryWhere ID. This could be used by other organizations to access your [facility] medical records  [id number]        Your Vitals Were     Pulse Height Pulse Oximetry BMI (Body Mass Index)          92 1.778 m (5' 10\") 98% 36.67 kg/m2         Blood Pressure from Last 3 Encounters:   01/16/18 130/90   10/25/17 113/76   10/11/17 (!) 135/93    Weight from Last 3 Encounters:   01/16/18 115.9 kg (255 lb 9.6 oz)   10/25/17 114.8 kg (253 lb)   10/11/17 114.9 kg (253 lb 6.4 oz)              Today, you had the following     No orders found for display         Today's Medication Changes          These changes are accurate as of: 1/16/18  3:01 PM.  If you have any questions, ask your nurse or doctor.               Start taking these medicines.        Dose/Directions    phentermine 37.5 MG tablet   Commonly known as:  ADIPEX-P   Used for:  Class 2 obesity due to excess calories with serious comorbidity in adult, unspecified BMI   Started by:  Umu Mahoney MD        Take 1/2 tab in am   Quantity:  16 tablet   Refills:  3       topiramate 50 MG tablet   Commonly known as:  TOPAMAX   Used for:  Class 2 obesity due to excess calories with serious comorbidity in adult, unspecified BMI   Started by:  Umu Mahoney MD        Dose:  50 mg   Take 1 tablet (50 mg) by " mouth daily   Quantity:  90 tablet   Refills:  3            Where to get your medicines      These medications were sent to [place] [address] [place] [facility] [address] [facility]  [address] [facility] [address]    Hours:  Tech issues with their phone system Phone:  [phone]     topiramate 50 MG tablet         Some of these will need a paper prescription and others can be bought over the counter.  Ask your nurse if you have questions.     Bring a paper prescription for each of these medications     phentermine 37.5 MG tablet                Primary Care Provider Office Phone # Fax #    Kenyatta Jose Del Castillo [phone]       [facility] [address]  [facility] [address]        Equal Access to Services     POLA SANDERS : Richard Peña, wamayte reyez, qaearleta kaalmada mark, boris townsend. So [facility] [phone].    ATENCIÓN: Si habla español, tiene a cormier disposición servicios gratuitos de asistencia lingüística. [facility] [phone].    We comply with applicable federal civil rights laws and [place] laws. We do not discriminate on the basis of race, color, national origin, age, disability, sex, sexual orientation, or gender identity.            Thank you!     Thank you for choosing [facility]NAG[facility]  for your care. Our goal is always to provide you with excellent care. Hearing back from our patients is one way we can continue to improve our services. Please take a few minutes to complete the written survey that you may receive in the mail after your visit with us. Thank you!             Your Updated Medication List - Protect others around you: Learn how to safely use, store and throw away your medicines at www.disposemymeds.org.          This list is accurate as of: 1/16/18  3:01 PM.  Always use your most recent med list.                   Brand Name Dispense Instructions for use Diagnosis    aspirin 81  MG EC tablet     90 tablet    Take 1 tablet (81 mg) by mouth daily    Benign essential hypertension       FLAXSEED OIL PO      Take as directed        lisinopril 20 MG tablet    PRINIVIL/ZESTRIL    90 tablet    Take 1 tablet (20 mg) by mouth daily    Benign essential hypertension       MULTIVITAMINS PO      Take 1 tablet by mouth daily.        NIFEdipine ER osmotic 90 MG 24 hr tablet    PROCARDIA XL    90 tablet    Take 1 tablet (90 mg) by mouth daily    Benign essential hypertension       phentermine 37.5 MG tablet    ADIPEX-P    16 tablet    Take 1/2 tab in am    Class 2 obesity due to excess calories with serious comorbidity in adult, unspecified BMI       topiramate 50 MG tablet    TOPAMAX    90 tablet    Take 1 tablet (50 mg) by mouth daily    Class 2 obesity due to excess calories with serious comorbidity in adult, unspecified BMI

## 2018-01-16 NOTE — NURSING NOTE
"Chief Complaint   Patient presents with     Weight Problem     RMWM       Vitals:    01/16/18 1416   BP: 130/90   Pulse: 92   SpO2: 98%   Weight: 255 lb 9.6 oz   Height: 5' 10\"       Body mass index is 36.67 kg/(m^2).    Consuelo Aleman CMA                          "

## 2018-01-26 ENCOUNTER — OFFICE VISIT (OUTPATIENT)
Dept: INTERNAL MEDICINE | Facility: CLINIC | Age: 43
End: 2018-01-26
Attending: INTERNAL MEDICINE
Payer: COMMERCIAL

## 2018-01-26 VITALS
SYSTOLIC BLOOD PRESSURE: 112 MMHG | DIASTOLIC BLOOD PRESSURE: 79 MMHG | HEIGHT: 70 IN | WEIGHT: 252 LBS | HEART RATE: 88 BPM | BODY MASS INDEX: 36.08 KG/M2

## 2018-01-26 DIAGNOSIS — I10 ESSENTIAL HYPERTENSION: Primary | ICD-10-CM

## 2018-01-26 ASSESSMENT — ANXIETY QUESTIONNAIRES
1. FEELING NERVOUS, ANXIOUS, OR ON EDGE: NOT AT ALL
GAD7 TOTAL SCORE: 1
3. WORRYING TOO MUCH ABOUT DIFFERENT THINGS: SEVERAL DAYS
2. NOT BEING ABLE TO STOP OR CONTROL WORRYING: NOT AT ALL
5. BEING SO RESTLESS THAT IT IS HARD TO SIT STILL: NOT AT ALL
6. BECOMING EASILY ANNOYED OR IRRITABLE: NOT AT ALL
7. FEELING AFRAID AS IF SOMETHING AWFUL MIGHT HAPPEN: NOT AT ALL

## 2018-01-26 ASSESSMENT — PAIN SCALES - GENERAL: PAINLEVEL: NO PAIN (0)

## 2018-01-26 ASSESSMENT — PATIENT HEALTH QUESTIONNAIRE - PHQ9: 5. POOR APPETITE OR OVEREATING: NOT AT ALL

## 2018-01-26 NOTE — PROGRESS NOTES
"HPI  Patient is here for follow up on hypertension. She reports that she has recently started phentermine. Her blood pressure was elevated at her visit with Weight Management. Her home blood pressure readings have been normal. Patient denies medication-related side effects.     Review of Systems     Constitutional:  Negative for fever, chills and fatigue.   Eyes:  Negative for pain, eye pain and decreased vision.   Respiratory:   Negative for cough and dyspnea on exertion.    Cardiovascular:  Negative for chest pain, dyspnea on exertion and edema.   Gastrointestinal:  Negative for abdominal pain, diarrhea and constipation.   Psychiatric/Behavioral:  Negative for depression, decreased concentration, mood swings and panic attacks.      Current Outpatient Prescriptions   Medication     phentermine (ADIPEX-P) 37.5 MG tablet     topiramate (TOPAMAX) 50 MG tablet     lisinopril (PRINIVIL/ZESTRIL) 20 MG tablet     NIFEdipine ER osmotic (PROCARDIA XL) 90 MG 24 hr tablet     aspirin 81 MG EC tablet     Flaxseed, Linseed, (FLAXSEED OIL PO)     Multiple Vitamin (MULTIVITAMINS PO)     No current facility-administered medications for this visit.      Vitals:    01/26/18 0916 01/26/18 0923 01/26/18 0924   BP: 109/71 117/81 112/79   Pulse: 84 85 88   Weight: 114.3 kg (252 lb)     Height: 1.778 m (5' 10\")         Physical Exam   Constitutional: She is well-developed, well-nourished, and in no distress.   HENT:   Head: Normocephalic and atraumatic.   Eyes: Conjunctivae are normal. Pupils are equal, round, and reactive to light.   Cardiovascular: Normal rate and regular rhythm.    Pulmonary/Chest: Effort normal.   Musculoskeletal: She exhibits no edema.   Skin: Skin is warm and dry.   Psychiatric: Memory, affect and judgment normal.   Vitals reviewed.    Assessment and Plan:  Holli was seen today for recheck.    Diagnoses and all orders for this visit:    Essential hypertension. Blood pressure is within acceptable range. Recommend to " continue with current medical therapy as she has been able to tolerate it well. Patient is in agreement with the plan.     Total time spent 15 minutes.  More than 50% of the time spent with Ms. Saxena on counseling / coordinating her care    Kenyatta Del Castillo MD

## 2018-01-26 NOTE — MR AVS SNAPSHOT
After Visit Summary   1/26/2018    Holli Saxena    MRN: [id number]           Patient Information     Date Of Birth          1975        Visit Information        Provider Department      1/26/2018 9:20 AM Kenyatta Del Castillo MD [facility]         Today's Diagnoses     Essential hypertension    -  1       Follow-ups after your visit        Follow-up notes from your care team     Return in about 3 months (around 4/26/2018).      Your next 10 appointments already scheduled     Apr 17, 2018  1:00 PM CDT   (Arrive by 12:45 PM)   Return Visit with Umu Mahoney MD   [facility] ([facility] [place])    [address] [facility]  [address] [facility] [address] [phone]              Who to contact     Please call your clinic at [phone] to:    Ask questions about your health    Make or cancel appointments    Discuss your medicines    Learn about your test results    Speak to your doctor   If you have compliments or concerns about an experience at your clinic, or if you wish to file a complaint, please contact [facility] Patient Relations at [phone] or email us at [email or web address]         Additional Information About Your Visit        MyChart Information     [employer] gives you secure access to your electronic health record. If you see a primary care provider, you can also send messages to your care team and make appointments. If you have questions, please call your primary care clinic.  If you do not have a primary care provider, please call [phone] and they will assist you.      [employer] is an electronic gateway that provides easy, online access to your medical records. With [employer], you can request a clinic appointment, read your test results, renew a prescription or communicate with your care team.     To access your existing account, please contact your [facility]  "[place] [facility] or call [phone] for assistance.        Care EveryWhere ID     This is your Care EveryWhere ID. This could be used by other organizations to access your [facility] medical records  [id number]        Your Vitals Were     Pulse Height Breastfeeding? BMI (Body Mass Index)          88 1.778 m (5' 10\") No 36.16 kg/m2         Blood Pressure from Last 3 Encounters:   01/26/18 112/79   01/16/18 130/90   10/25/17 113/76    Weight from Last 3 Encounters:   01/26/18 114.3 kg (252 lb)   01/16/18 115.9 kg (255 lb 9.6 oz)   10/25/17 114.8 kg (253 lb)              Today, you had the following     No orders found for display       Primary Care Provider Office Phone # Fax #    Kenyatta Jose Del Castillo [phone]       [facility] [address]  [facility] [address]        Equal Access to Services     ANYA [facility] Hospital[facility] : Hadii aad ku hadasho Soomaali, waaxda luqadaha, qaybta kaalmada adeegyada, waxay catherinein hayjosepn vinicius britton . So [facility] [phone].    ATENCIÓN: Si habla español, tiene a cormier disposición servicios gratuitos de asistencia lingüística. Llame al [phone].    We comply with applicable federal civil rights laws and [place] laws. We do not discriminate on the basis of race, color, national origin, age, disability, sex, sexual orientation, or gender identity.            Thank you!     Thank you for choosing [facility]   for your care. Our goal is always to provide you with excellent care. Hearing back from our patients is one way we can continue to improve our services. Please take a few minutes to complete the written survey that you may receive in the mail after your visit with us. Thank you!             Your Updated Medication List - Protect others around you: Learn how to safely use, store and throw away your medicines at www.disposemymeds.org.          This list is accurate as of 1/26/18 11:59 PM.  Always use your most recent " med list.                   Brand Name Dispense Instructions for use Diagnosis    aspirin 81 MG EC tablet     90 tablet    Take 1 tablet (81 mg) by mouth daily    Benign essential hypertension       FLAXSEED OIL PO      Take as directed        lisinopril 20 MG tablet    PRINIVIL/ZESTRIL    90 tablet    Take 1 tablet (20 mg) by mouth daily    Benign essential hypertension       MULTIVITAMINS PO      Take 1 tablet by mouth daily.        NIFEdipine ER osmotic 90 MG 24 hr tablet    PROCARDIA XL    90 tablet    Take 1 tablet (90 mg) by mouth daily    Benign essential hypertension       phentermine 37.5 MG tablet    ADIPEX-P    16 tablet    Take 1/2 tab in am    Class 2 obesity due to excess calories with serious comorbidity in adult, unspecified BMI       topiramate 50 MG tablet    TOPAMAX    90 tablet    Take 1 tablet (50 mg) by mouth daily    Class 2 obesity due to excess calories with serious comorbidity in adult, unspecified BMI

## 2018-01-26 NOTE — LETTER
"1/26/2018       RE: Holli Saxena  [address]  [facility] [address]     Dear Colleague,    Thank you for referring your patient, Holli Saxena, to the [facility]  at [facility]. Please see a copy of my visit note below.    HPI  Patient is here for follow up on hypertension. She reports that she has recently started phentermine. Her blood pressure was elevated at her visit with Weight Management. Her home blood pressure readings have been normal. Patient denies medication-related side effects.     Review of Systems     Constitutional:  Negative for fever, chills and fatigue.   Eyes:  Negative for pain, eye pain and decreased vision.   Respiratory:   Negative for cough and dyspnea on exertion.    Cardiovascular:  Negative for chest pain, dyspnea on exertion and edema.   Gastrointestinal:  Negative for abdominal pain, diarrhea and constipation.   Psychiatric/Behavioral:  Negative for depression, decreased concentration, mood swings and panic attacks.      Current Outpatient Prescriptions   Medication     phentermine (ADIPEX-P) 37.5 MG tablet     topiramate (TOPAMAX) 50 MG tablet     lisinopril (PRINIVIL/ZESTRIL) 20 MG tablet     NIFEdipine ER osmotic (PROCARDIA XL) 90 MG 24 hr tablet     aspirin 81 MG EC tablet     Flaxseed, Linseed, (FLAXSEED OIL PO)     Multiple Vitamin (MULTIVITAMINS PO)     No current facility-administered medications for this visit.      Vitals:    01/26/18 0916 01/26/18 0923 01/26/18 0924   BP: 109/71 117/81 112/79   Pulse: 84 85 88   Weight: 114.3 kg (252 lb)     Height: 1.778 m (5' 10\")         Physical Exam   Constitutional: She is well-developed, well-nourished, and in no distress.   HENT:   Head: Normocephalic and atraumatic.   Eyes: Conjunctivae are normal. Pupils are equal, round, and reactive to light.   Cardiovascular: Normal rate and regular rhythm.    Pulmonary/Chest: Effort normal.   Musculoskeletal: She " exhibits no edema.   Skin: Skin is warm and dry.   Psychiatric: Memory, affect and judgment normal.   Vitals reviewed.    Assessment and Plan:  Holli was seen today for recheck.    Diagnoses and all orders for this visit:    Essential hypertension. Blood pressure is within acceptable range. Recommend to continue with current medical therapy as she has been able to tolerate it well. Patient is in agreement with the plan.     Total time spent 15 minutes.  More than 50% of the time spent with Ms. Saxena on counseling / coordinating her care    Kenyatta Del Castillo MD

## 2018-01-27 ASSESSMENT — ANXIETY QUESTIONNAIRES: GAD7 TOTAL SCORE: 1

## 2018-01-27 ASSESSMENT — PATIENT HEALTH QUESTIONNAIRE - PHQ9: SUM OF ALL RESPONSES TO PHQ QUESTIONS 1-9: 0

## 2018-01-28 ASSESSMENT — ENCOUNTER SYMPTOMS
COUGH: 0
FATIGUE: 0
ABDOMINAL PAIN: 0
DIARRHEA: 0
CHILLS: 0
DYSPNEA ON EXERTION: 0
CONSTIPATION: 0
NERVOUS/ANXIOUS: 0
EYE PAIN: 0
DEPRESSION: 0
PANIC: 0
INSOMNIA: 0
DECREASED CONCENTRATION: 0
FEVER: 0

## 2018-01-30 DIAGNOSIS — E66.01 MORBID OBESITY DUE TO EXCESS CALORIES (H): ICD-10-CM

## 2018-01-30 RX ORDER — BUPROPION HYDROCHLORIDE 150 MG/1
150 TABLET, EXTENDED RELEASE ORAL 2 TIMES DAILY
Qty: 180 TABLET | Refills: 4 | OUTPATIENT
Start: 2018-01-30

## 2018-04-03 DIAGNOSIS — I10 BENIGN ESSENTIAL HYPERTENSION: ICD-10-CM

## 2018-04-04 RX ORDER — NIFEDIPINE 90 MG/1
90 TABLET, EXTENDED RELEASE ORAL DAILY
Qty: 30 TABLET | Refills: 0 | Status: SHIPPED | OUTPATIENT
Start: 2018-04-04 | End: 2018-04-17

## 2018-04-04 NOTE — TELEPHONE ENCOUNTER
Last Clinic Visit: 1/16/2018  [facility]    Next appointment 4-17-18

## 2018-04-17 ENCOUNTER — OFFICE VISIT (OUTPATIENT)
Dept: ENDOCRINOLOGY | Facility: CLINIC | Age: 43
End: 2018-04-17
Payer: COMMERCIAL

## 2018-04-17 VITALS
SYSTOLIC BLOOD PRESSURE: 121 MMHG | DIASTOLIC BLOOD PRESSURE: 85 MMHG | WEIGHT: 245.2 LBS | BODY MASS INDEX: 35.1 KG/M2 | HEIGHT: 70 IN | HEART RATE: 86 BPM

## 2018-04-17 DIAGNOSIS — I10 BENIGN ESSENTIAL HYPERTENSION: ICD-10-CM

## 2018-04-17 RX ORDER — NIFEDIPINE 90 MG/1
90 TABLET, EXTENDED RELEASE ORAL DAILY
Qty: 30 TABLET | Refills: 3 | Status: SHIPPED | OUTPATIENT
Start: 2018-04-17 | End: 2018-09-17

## 2018-04-17 RX ORDER — TOPIRAMATE 50 MG/1
50 TABLET, FILM COATED ORAL DAILY
Qty: 90 TABLET | Refills: 3 | Status: SHIPPED | OUTPATIENT
Start: 2018-04-17 | End: 2019-03-15

## 2018-04-17 RX ORDER — PHENTERMINE HYDROCHLORIDE 37.5 MG/1
TABLET ORAL
Qty: 16 TABLET | Refills: 3 | Status: SHIPPED | OUTPATIENT
Start: 2018-04-17 | End: 2018-07-24

## 2018-04-17 ASSESSMENT — ENCOUNTER SYMPTOMS
HOT FLASHES: 0
INSOMNIA: 0
INCREASED ENERGY: 0
DOUBLE VISION: 0
FLANK PAIN: 0
SINUS PAIN: 0
POLYDIPSIA: 0
LEG PAIN: 0
TINGLING: 0
DEPRESSION: 0
NECK PAIN: 0
HALLUCINATIONS: 0
NUMBNESS: 0
HEADACHES: 0
JOINT SWELLING: 0
PARALYSIS: 0
MUSCLE WEAKNESS: 0
CLAUDICATION: 0
SKIN CHANGES: 0
SEIZURES: 0
BLOOD IN STOOL: 0
EYE PAIN: 0
CONSTIPATION: 0
HYPOTENSION: 0
MUSCLE CRAMPS: 0
MEMORY LOSS: 0
NERVOUS/ANXIOUS: 0
TREMORS: 0
DYSURIA: 0
JAUNDICE: 0
DIZZINESS: 0
SNORES LOUDLY: 0
WEIGHT LOSS: 0
STIFFNESS: 0
ARTHRALGIAS: 0
SPEECH CHANGE: 0
DECREASED CONCENTRATION: 0
SINUS CONGESTION: 0
MYALGIAS: 0
FEVER: 0
SLEEP DISTURBANCES DUE TO BREATHING: 0
HYPERTENSION: 0
BREAST MASS: 0
DIARRHEA: 0
LIGHT-HEADEDNESS: 0
SMELL DISTURBANCE: 0
SYNCOPE: 0
ORTHOPNEA: 0
PALPITATIONS: 0
DISTURBANCES IN COORDINATION: 0
DIFFICULTY URINATING: 0
NECK MASS: 0
BLOATING: 0
SWOLLEN GLANDS: 0
POLYPHAGIA: 0
LEG SWELLING: 0
TASTE DISTURBANCE: 0
HEARTBURN: 0
EXERCISE INTOLERANCE: 0
VOMITING: 0
BRUISES/BLEEDS EASILY: 0
DYSPNEA ON EXERTION: 0
ALTERED TEMPERATURE REGULATION: 0
EYE IRRITATION: 0
WEIGHT GAIN: 0
HEMATURIA: 0
POSTURAL DYSPNEA: 0
RESPIRATORY PAIN: 0
NIGHT SWEATS: 0
WEAKNESS: 0
WHEEZING: 0
TROUBLE SWALLOWING: 0
DECREASED APPETITE: 0
BOWEL INCONTINENCE: 0
CHILLS: 0
EXTREMITY NUMBNESS: 0
SPUTUM PRODUCTION: 0
HEMOPTYSIS: 0
PANIC: 0
RECTAL BLEEDING: 0
ABDOMINAL PAIN: 0
EYE REDNESS: 0
NAIL CHANGES: 0
TACHYCARDIA: 0
SORE THROAT: 0
FATIGUE: 0
EYE WATERING: 0
NAUSEA: 0
DECREASED LIBIDO: 0
HOARSE VOICE: 0
RECTAL PAIN: 0
SHORTNESS OF BREATH: 0
BACK PAIN: 0
LOSS OF CONSCIOUSNESS: 0
BREAST PAIN: 0
COUGH DISTURBING SLEEP: 0
COUGH: 0
POOR WOUND HEALING: 0

## 2018-04-17 NOTE — PROGRESS NOTES
"    Return Medical Weight Management Note     Holli Saxena  MRN:  [id number]  :  1975  ALIA:  2018    Dear Abundio, Kenyatta Garcia,    I had the pleasure of seeing your patient Holli Saxena.  She is a 42 year old female who I am continuing to see for treatment of obesity related to:    HTN, hyperlipidemia, and PCOS    INTERVAL HISTORY:    Last visit with Dr Mahoney was 18. She has lost 10 lbs since started topiramate/phentermine 3 months ago.  She notices a big difference in hunger and cravings. She stopped Contrave after last visit due to non response.    \"Weight up, emotional eating, on naltrexone and bupropion. 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    Food goal: 1,400 calorie food plan to lose 1 lbs weekly without exericse     Activity goal: to keep walking at work 150 minutes weekly     Medication goal: to restart phentermine and topiramate (if blood pressure goes up temporarily while on phentermine then consider doubling lisinopril)     Therapy goal: to start counseling for stress management     Commuting goal: to start listening to Audible books such as The [employer]ic[employer] Eating Plan and management classes\"    CURRENT WEIGHT:   245 lbs 3.2 oz    Wt Readings from Last 4 Encounters:   18 245 lb 3.2 oz   18 252 lb   18 255 lb 9.6 oz   10/25/17 253 lb       Height:  5' 10\"  Body Mass Index:  Body mass index is 35.18 kg/(m^2).  Vitals: /85  Pulse 86  Ht 5' 10\"  Wt 245 lb 3.2 oz  BMI 35.18 kg/m2    Initial consult weight was 225 on 7/12/10.  Weight change since last seen on 2018 is down 10 pounds.   Total weight gain is 20 pounds since     Diet and Activity Changes Since Last Visit Reviewed With Patient 2018   I have made the following changes to my diet since my last visit: reduced calorie intake to 1400   With regards to my diet, I am still struggling with: sugar intake   For breakfast, I typically " eat: coffee, toast with PB   For lunch, I typically eat: fruit, nuts, half a sandwich   For supper, I typically eat: meal with family   For snack(s), I typically eat: fruit, carrots, celery, nuts,    I have made the following changes to my activity/exercise since my last visit: yoga twice a week   With regards to my activity/exercise, I am still struggling with: motivation, weather       Review of Systems     Constitutional:  Negative for fever, chills, weight loss, weight gain, fatigue, decreased appetite, night sweats, recent stressors, height gain, height loss, post-operative complications, incisional pain, hallucinations, increased energy, hyperactivity and confused.   HENT:  Negative for ear pain, hearing loss, tinnitus, nosebleeds, trouble swallowing, hoarse voice, mouth sores, sore throat, ear discharge, tooth pain, gum tenderness, taste disturbance, smell disturbance, hearing aid, bleeding gums, dry mouth, sinus pain, sinus congestion and neck mass.    Eyes:  Negative for double vision, pain, redness, eye pain, decreased vision, eye watering, eye bulging, eye dryness, flashing lights, spots, floaters, strabismus, tunnel vision, jaundice and eye irritation.   Respiratory:   Negative for cough, hemoptysis, sputum production, shortness of breath, wheezing, sleep disturbances due to breathing, snores loudly, respiratory pain, dyspnea on exertion, cough disturbing sleep and postural dyspnea.    Cardiovascular:  Negative for chest pain, dyspnea on exertion, palpitations, orthopnea, claudication, leg swelling, fingers/toes turn blue, hypertension, hypotension, syncope, history of heart murmur, chest pain on exertion, chest pain at rest, pacemaker, few scattered varicosities, leg pain, sleep disturbances due to breathing, tachycardia, light-headedness, exercise intolerance and edema.   Gastrointestinal:  Negative for heartburn, nausea, vomiting, abdominal pain, diarrhea, constipation, blood in stool, melena, rectal  pain, bloating, hemorrhoids, bowel incontinence, jaundice, rectal bleeding, coffee ground emesis and change in stool.   Genitourinary:  Negative for bladder incontinence, dysuria, urgency, hematuria, flank pain, vaginal discharge, difficulty urinating, genital sores, dyspareunia, decreased libido, nocturia, voiding less frequently, arousal difficulty, abnormal vaginal bleeding, excessive menstruation, menstrual changes, hot flashes, vaginal dryness and postmenopausal bleeding.   Musculoskeletal:  Negative for myalgias, back pain, joint swelling, arthralgias, stiffness, muscle cramps, neck pain, bone pain, muscle weakness and fracture.   Skin:  Negative for nail changes, itching, poor wound healing, rash, hair changes, skin changes, acne, warts, poor wound healing, scarring, flaky skin, Raynaud's phenomenon, sensitivity to sunlight and skin thickening.   Neurological:  Negative for dizziness, tingling, tremors, speech change, seizures, loss of consciousness, weakness, light-headedness, numbness, headaches, disturbances in coordination, extremity numbness, memory loss, difficulty walking and paralysis.   Endo/Heme:  Negative for anemia, swollen glands and bruises/bleeds easily.   Psychiatric/Behavioral:  Negative for depression, hallucinations, memory loss, decreased concentration, mood swings and panic attacks.    Breast:  Negative for breast discharge, breast mass, breast pain and nipple retraction.   Endocrine:  Negative for altered temperature regulation, polyphagia, polydipsia, unwanted hair growth and change in facial hair.      MEDICATIONS:   Current Outpatient Prescriptions   Medication     NIFEdipine ER osmotic (PROCARDIA XL) 90 MG 24 hr tablet     phentermine (ADIPEX-P) 37.5 MG tablet     topiramate (TOPAMAX) 50 MG tablet     lisinopril (PRINIVIL/ZESTRIL) 20 MG tablet     aspirin 81 MG EC tablet     Flaxseed, Linseed, (FLAXSEED OIL PO)     Multiple Vitamin (MULTIVITAMINS PO)     No current  facility-administered medications for this visit.        Weight Loss Medication History Reviewed With Patient 4/17/2018   Which weight loss medications are you currently taking on a regular basis?  Qysmia (phentermine/topiramate)   If you are not taking a weight loss medication that was prescribed to you, please indicate why: -   Are you having any side effects from the weight loss medication that we have prescribed you? No       ASSESSMENT:   42 y.o. Female here for [facility] follow up.  She is doing well on topiramate and phentermine and she is happy with her weight loss and how she feels with decreased hunger.    PLAN:   Refill topiramate 50mg daily  Refill phentermine 18.75mg daily    FOLLOW-UP:    July Dr Mahoney    Time: 15 min spent on evaluation, management, counseling, education, & motivational interviewing with greater than 50 % of the total time was spent on counseling and coordinating care    Sincerely,    Amber Orozco PA-C

## 2018-04-17 NOTE — LETTER
"2018       RE: Holli Saxena  [address]  [facility] [address]     Dear Colleague,    Thank you for referring your patient, Holli Saxena, to the [facility] MEDICAL WEIGHT MANAGEMENT at [facility]. Please see a copy of my visit note below.        Return Medical Weight Management Note     Holli Saxena  MRN:  [id number]  :  1975  ALIA:  2018    Dear Kenyatta Del Castillo,    I had the pleasure of seeing your patient Holli Saxena.  She is a 42 year old female who I am continuing to see for treatment of obesity related to:    HTN, hyperlipidemia, and PCOS    INTERVAL HISTORY:    Last visit with Dr Mahoney was 18. She has lost 10 lbs since started topiramate/phentermine 3 months ago.  She notices a big difference in hunger and cravings. She stopped Contrave after last visit due to non response.    \"Weight up, emotional eating, on naltrexone and bupropion. 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    Food goal: 1,400 calorie food plan to lose 1 lbs weekly without exericse     Activity goal: to keep walking at work 150 minutes weekly     Medication goal: to restart phentermine and topiramate (if blood pressure goes up temporarily while on phentermine then consider doubling lisinopril)     Therapy goal: to start counseling for stress management     Commuting goal: to start listening to Audible books such as The Volumetrics Eating Plan and management classes\"    CURRENT WEIGHT:   245 lbs 3.2 oz    Wt Readings from Last 4 Encounters:   18 245 lb 3.2 oz   18 252 lb   18 255 lb 9.6 oz   10/25/17 253 lb       Height:  5' 10\"  Body Mass Index:  Body mass index is 35.18 kg/(m^2).  Vitals: /85  Pulse 86  Ht 5' 10\"  Wt 245 lb 3.2 oz  BMI 35.18 kg/m2    Initial consult weight was 225 on 7/12/10.  Weight change since last seen on 2018 is down 10 pounds.   Total weight gain is " 20 pounds since 2010    Diet and Activity Changes Since Last Visit Reviewed With Patient 4/17/2018   I have made the following changes to my diet since my last visit: reduced calorie intake to 1400   With regards to my diet, I am still struggling with: sugar intake   For breakfast, I typically eat: coffee, toast with PB   For lunch, I typically eat: fruit, nuts, half a sandwich   For supper, I typically eat: meal with family   For snack(s), I typically eat: fruit, carrots, celery, nuts,    I have made the following changes to my activity/exercise since my last visit: yoga twice a week   With regards to my activity/exercise, I am still struggling with: motivation, weather       Review of Systems     Constitutional:  Negative for fever, chills, weight loss, weight gain, fatigue, decreased appetite, night sweats, recent stressors, height gain, height loss, post-operative complications, incisional pain, hallucinations, increased energy, hyperactivity and confused.   HENT:  Negative for ear pain, hearing loss, tinnitus, nosebleeds, trouble swallowing, hoarse voice, mouth sores, sore throat, ear discharge, tooth pain, gum tenderness, taste disturbance, smell disturbance, hearing aid, bleeding gums, dry mouth, sinus pain, sinus congestion and neck mass.    Eyes:  Negative for double vision, pain, redness, eye pain, decreased vision, eye watering, eye bulging, eye dryness, flashing lights, spots, floaters, strabismus, tunnel vision, jaundice and eye irritation.   Respiratory:   Negative for cough, hemoptysis, sputum production, shortness of breath, wheezing, sleep disturbances due to breathing, snores loudly, respiratory pain, dyspnea on exertion, cough disturbing sleep and postural dyspnea.    Cardiovascular:  Negative for chest pain, dyspnea on exertion, palpitations, orthopnea, claudication, leg swelling, fingers/toes turn blue, hypertension, hypotension, syncope, history of heart murmur, chest pain on exertion, chest  pain at rest, pacemaker, few scattered varicosities, leg pain, sleep disturbances due to breathing, tachycardia, light-headedness, exercise intolerance and edema.   Gastrointestinal:  Negative for heartburn, nausea, vomiting, abdominal pain, diarrhea, constipation, blood in stool, melena, rectal pain, bloating, hemorrhoids, bowel incontinence, jaundice, rectal bleeding, coffee ground emesis and change in stool.   Genitourinary:  Negative for bladder incontinence, dysuria, urgency, hematuria, flank pain, vaginal discharge, difficulty urinating, genital sores, dyspareunia, decreased libido, nocturia, voiding less frequently, arousal difficulty, abnormal vaginal bleeding, excessive menstruation, menstrual changes, hot flashes, vaginal dryness and postmenopausal bleeding.   Musculoskeletal:  Negative for myalgias, back pain, joint swelling, arthralgias, stiffness, muscle cramps, neck pain, bone pain, muscle weakness and fracture.   Skin:  Negative for nail changes, itching, poor wound healing, rash, hair changes, skin changes, acne, warts, poor wound healing, scarring, flaky skin, Raynaud's phenomenon, sensitivity to sunlight and skin thickening.   Neurological:  Negative for dizziness, tingling, tremors, speech change, seizures, loss of consciousness, weakness, light-headedness, numbness, headaches, disturbances in coordination, extremity numbness, memory loss, difficulty walking and paralysis.   Endo/Heme:  Negative for anemia, swollen glands and bruises/bleeds easily.   Psychiatric/Behavioral:  Negative for depression, hallucinations, memory loss, decreased concentration, mood swings and panic attacks.    Breast:  Negative for breast discharge, breast mass, breast pain and nipple retraction.   Endocrine:  Negative for altered temperature regulation, polyphagia, polydipsia, unwanted hair growth and change in facial hair.      MEDICATIONS:   Current Outpatient Prescriptions   Medication     NIFEdipine ER osmotic  (PROCARDIA XL) 90 MG 24 hr tablet     phentermine (ADIPEX-P) 37.5 MG tablet     topiramate (TOPAMAX) 50 MG tablet     lisinopril (PRINIVIL/ZESTRIL) 20 MG tablet     aspirin 81 MG EC tablet     Flaxseed, Linseed, (FLAXSEED OIL PO)     Multiple Vitamin (MULTIVITAMINS PO)     No current facility-administered medications for this visit.        Weight Loss Medication History Reviewed With Patient 4/17/2018   Which weight loss medications are you currently taking on a regular basis?  Qysmia (phentermine/topiramate)   If you are not taking a weight loss medication that was prescribed to you, please indicate why: -   Are you having any side effects from the weight loss medication that we have prescribed you? No       ASSESSMENT:   42 y.o. Female here for [facility] follow up.  She is doing well on topiramate and phentermine and she is happy with her weight loss and how she feels with decreased hunger.    PLAN:   Refill topiramate 50mg daily  Refill phentermine 18.75mg daily    FOLLOW-UP:    July Dr Mahoney    Time: 15 min spent on evaluation, management, counseling, education, & motivational interviewing with greater than 50 % of the total time was spent on counseling and coordinating care    Sincerely,    Amber Orozco PA-C

## 2018-04-17 NOTE — NURSING NOTE
"  Chief Complaint   Patient presents with     Weight Problem     RMWM     Vitals:    04/17/18 1142   BP: 121/85   Pulse: 86   Weight: 245 lb 3.2 oz   Height: 5' 10\"     Body mass index is 35.18 kg/(m^2).  Consuelo Aleman CMA    "

## 2018-04-17 NOTE — MR AVS SNAPSHOT
After Visit Summary   4/17/2018    Holli Saxena    MRN: [id number]           Patient Information     Date Of Birth          1975        Visit Information        Provider Department      4/17/2018 11:30 AM Amber Orozco PA-C [facility] Medical Weight Management        Today's Diagnoses     Class 2 obesity due to excess calories with serious comorbidity in adult, unspecified BMI        Benign essential hypertension           Follow-ups after your visit        Your next 10 appointments already scheduled     Jul 24, 2018  1:00 PM CDT   (Arrive by 12:45 PM)   Return Visit with MD ANGEL [facility] Medical Weight Management ([facility] [place])    [address] [facility] [address] [phone]              Who to contact     Please call your clinic at [phone] to:    Ask questions about your health    Make or cancel appointments    Discuss your medicines    Learn about your test results    Speak to your doctor            Additional Information About Your Visit        [employer] Information     [employer] gives you secure access to your electronic health record. If you see a primary care provider, you can also send messages to your care team and make appointments. If you have questions, please call your primary care clinic.  If you do not have a primary care provider, please call [phone] and they will assist you.      [employer] is an electronic gateway that provides easy, online access to your medical records. With [employer], you can request a clinic appointment, read your test results, renew a prescription or communicate with your care team.     To access your existing account, please contact your [facility] or call [phone] for assistance.        Care EveryWhere ID     This is your Care EveryWhere ID. This could be used by other organizations to access your [place] medical records  [id number]    "     Your Vitals Were     Pulse Height BMI (Body Mass Index)             86 5' 10\" 35.18 kg/m2          Blood Pressure from Last 3 Encounters:   04/17/18 121/85   01/26/18 112/79   01/16/18 130/90    Weight from Last 3 Encounters:   04/17/18 245 lb 3.2 oz   01/26/18 252 lb   01/16/18 255 lb 9.6 oz              Today, you had the following     No orders found for display         Where to get your medicines      These medications were sent to [facility] [id number] [address]    Hours:  Tech issues with their phone system Phone:  [phone]     NIFEdipine ER osmotic 90 MG 24 hr tablet    topiramate 50 MG tablet         Some of these will need a paper prescription and others can be bought over the counter.  Ask your nurse if you have questions.     Bring a paper prescription for each of these medications     phentermine 37.5 MG tablet          Primary Care Provider Office Phone # Fax #    Kenyatta Jose Del Castillo [phone]       [facility] [address] [place] [address]        Equal Access to Services     [facility]: Hadii jesenia garcias hadasho Sokaren, waaxda luqadaha, qaybta kaalmada adebereket, boris britton . So [facility] [phone].    ATENCIÓN: Si habla español, tiene a cormier disposición servicios gratuitos de asistencia lingüística. [facility] [phone].    We comply with applicable federal civil rights laws and [place] laws. We do not discriminate on the basis of race, color, national origin, age, disability, sex, sexual orientation, or gender identity.            Thank you!     Thank you for choosing [facility] MEDICAL WEIGHT MANAGEMENT  for your care. Our goal is always to provide you with excellent care. Hearing back from our patients is one way we can continue to improve our services. Please take a few minutes to complete the written survey that you may receive in the mail after your " visit with us. Thank you!             Your Updated Medication List - Protect others around you: Learn how to safely use, store and throw away your medicines at www.disposemymeds.org.          This list is accurate as of 4/17/18 12:42 PM.  Always use your most recent med list.                   Brand Name Dispense Instructions for use Diagnosis    aspirin 81 MG EC tablet     90 tablet    Take 1 tablet (81 mg) by mouth daily    Benign essential hypertension       FLAXSEED OIL PO      Take as directed        lisinopril 20 MG tablet    PRINIVIL/ZESTRIL    90 tablet    Take 1 tablet (20 mg) by mouth daily    Benign essential hypertension       MULTIVITAMINS PO      Take 1 tablet by mouth daily.        NIFEdipine ER osmotic 90 MG 24 hr tablet    PROCARDIA XL    30 tablet    Take 1 tablet (90 mg) by mouth daily    Benign essential hypertension       phentermine 37.5 MG tablet    ADIPEX-P    16 tablet    Take 1/2 tab in am    Class 2 obesity due to excess calories with serious comorbidity in adult, unspecified BMI       topiramate 50 MG tablet    TOPAMAX    90 tablet    Take 1 tablet (50 mg) by mouth daily    Class 2 obesity due to excess calories with serious comorbidity in adult, unspecified BMI

## 2018-07-24 ENCOUNTER — OFFICE VISIT (OUTPATIENT)
Dept: ENDOCRINOLOGY | Facility: CLINIC | Age: 43
End: 2018-07-24
Payer: COMMERCIAL

## 2018-07-24 VITALS
HEART RATE: 95 BPM | TEMPERATURE: 98.4 F | HEIGHT: 70 IN | BODY MASS INDEX: 33.24 KG/M2 | WEIGHT: 232.2 LBS | DIASTOLIC BLOOD PRESSURE: 76 MMHG | SYSTOLIC BLOOD PRESSURE: 117 MMHG

## 2018-07-24 RX ORDER — PHENTERMINE HYDROCHLORIDE 37.5 MG/1
TABLET ORAL
Qty: 16 TABLET | Refills: 3 | Status: SHIPPED | OUTPATIENT
Start: 2018-07-24 | End: 2018-11-06

## 2018-07-24 ASSESSMENT — PAIN SCALES - GENERAL: PAINLEVEL: NO PAIN (0)

## 2018-07-24 NOTE — NURSING NOTE
"Chief Complaint   Patient presents with     Weight Problem     Follow-up per patient request.       Vitals:    07/24/18 1258   BP: 117/76   BP Location: Left arm   Patient Position: Sitting   Cuff Size: Adult Regular   Pulse: 95   Temp: 98.4  F (36.9  C)   TempSrc: Oral   Weight: 232 lb 3.2 oz   Height: 5' 10\"       Body mass index is 33.32 kg/(m^2).      Remi Bacon, EMT                      "

## 2018-07-24 NOTE — MR AVS SNAPSHOT
After Visit Summary   7/24/2018    Holli Saxena    MRN: [id number]           Patient Information     Date Of Birth          1975        Visit Information        Provider Department      7/24/2018 1:00 PM Umu Mahoney [facility] Medical Weight Management        Today's Diagnoses     Class 2 obesity due to excess calories with serious comorbidity in adult, unspecified BMI          Care Instructions    Food goal: to continue to plan and pack foods on the go and have evening meal     Activity goal: to keep walking at work 150 minutes weekly     Medication goal: to restart phentermine and topiramate     Commuting goal: to start listening to Audible books such as The [place] Eating Plan and management classes          Follow-ups after your visit        Follow-up notes from your care team     Return in about 3 months (around 10/24/2018).      Who to contact     Please call your clinic at [phone] to:    Ask questions about your health    Make or cancel appointments    Discuss your medicines    Learn about your test results    Speak to your doctor            Additional Information About Your Visit        [email or web address] Information     [employer] gives you secure access to your electronic health record. If you see a primary care provider, you can also send messages to your care team and make appointments. If you have questions, please call your primary care clinic.  If you do not have a primary care provider, please call [phone] and they will assist you.      [employer] is an electronic gateway that provides easy, online access to your medical records. With [employer], you can request a clinic appointment, read your test results, renew a prescription or communicate with your care team.     To access your existing account, please contact your [facility] or call [phone] for assistance.        Care EveryWhere ID     This is your Care EveryWhere ID. This could be  "used by other organizations to access your [place] medical records  [id number]        Your Vitals Were     Pulse Temperature Height BMI (Body Mass Index)          95 98.4  F (36.9  C) (Oral) 5' 10\" 33.32 kg/m2         Blood Pressure from Last 3 Encounters:   07/24/18 117/76   04/17/18 121/85   01/26/18 112/79    Weight from Last 3 Encounters:   07/24/18 232 lb 3.2 oz   04/17/18 245 lb 3.2 oz   01/26/18 252 lb              Today, you had the following     No orders found for display         Where to get your medicines      Some of these will need a paper prescription and others can be bought over the counter.  Ask your nurse if you have questions.     Bring a paper prescription for each of these medications     phentermine 37.5 MG tablet          Primary Care Provider Office Phone # Fax #    Kenyatta Jose Del Castillo [phone]       [facility] [address]  [facility] [address]        Equal Access to Services     [facility] : Hadii aad ku hadasho Soomaali, waaxda luqadaha, qaybta kaalmada adeegyada, boris britton . So [facility] [phone].    ATENCIÓN: Si habla español, tiene a cormier disposición servicios gratuitos de asistencia lingüística. [facility] [phone].    We comply with applicable federal civil rights laws and [place] laws. We do not discriminate on the basis of race, color, national origin, age, disability, sex, sexual orientation, or gender identity.            Thank you!     Thank you for choosing [facility] MEDICAL WEIGHT MANAGEMENT  for your care. Our goal is always to provide you with excellent care. Hearing back from our patients is one way we can continue to improve our services. Please take a few minutes to complete the written survey that you may receive in the mail after your visit with us. Thank you!             Your Updated Medication List - Protect others around you: Learn how to safely use, store and throw away your medicines at " www.disposemymeds.org.          This list is accurate as of 7/24/18  1:32 PM.  Always use your most recent med list.                   Brand Name Dispense Instructions for use Diagnosis    aspirin 81 MG EC tablet     90 tablet    Take 1 tablet (81 mg) by mouth daily    Benign essential hypertension       FLAXSEED OIL PO      Take as directed        lisinopril 20 MG tablet    PRINIVIL/ZESTRIL    90 tablet    Take 1 tablet (20 mg) by mouth daily    Benign essential hypertension       MULTIVITAMINS PO      Take 1 tablet by mouth daily.        NIFEdipine ER osmotic 90 MG 24 hr tablet    PROCARDIA XL    30 tablet    Take 1 tablet (90 mg) by mouth daily    Benign essential hypertension       phentermine 37.5 MG tablet    ADIPEX-P    16 tablet    Take 1/2 tab in am    Class 2 obesity due to excess calories with serious comorbidity in adult, unspecified BMI       topiramate 50 MG tablet    TOPAMAX    90 tablet    Take 1 tablet (50 mg) by mouth daily    Class 2 obesity due to excess calories with serious comorbidity in adult, unspecified BMI

## 2018-07-24 NOTE — LETTER
"2018       RE: Holli Saxena  [address]  [facility] [address]     Dear Colleague,    Thank you for referring your patient, Holli Saxena, to the [facility] MEDICAL WEIGHT MANAGEMENT at [facility]. Please see a copy of my visit note below.        Return Medical Weight Management Note     Holli Saxena  MRN:  [id number]  :  1975  ALIA:  2018    Dear Kenyatta Del Castillo,    I had the pleasure of seeing your patient Holli Saxena.  She is a 43 year old female who I am continuing to see for treatment of obesity related to:  HTN, hyperlipidemia, and PCOS    INTERVAL HISTORY:  Taking phentermine and topiramate without significant side effects. Pop tastes metallic, so she is pleased about this and she is tolerating the tingling in her feet because the medications are helping with weight loss. 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 Managing stress better. Eating small vegetable snacks at work and having meal in evening and this is working well for her. She wants to continue the medications.    CURRENT WEIGHT:   232 lbs 3.2 oz    Wt Readings from Last 4 Encounters:   18 105.3 kg (232 lb 3.2 oz)   18 111.2 kg (245 lb 3.2 oz)   18 114.3 kg (252 lb)   18 115.9 kg (255 lb 9.6 oz)     Height:  5' 10\"  Body Mass Index:  Body mass index is 33.32 kg/(m^2).  Vitals:  B/P: 117/76, P: 95    Initial consult weight was 225 on 7/12/10.  Weight change since last seen on 2018 is down 13 pounds.   Total gain is 7 pounds.    Diet and Activity Changes Since Last Visit Reviewed With Patient 2018   I have made the following changes to my diet since my last visit: Intermittent fasting   With regards to my diet, I am still struggling with: Going good right now   For breakfast, I typically eat: -   For lunch, I typically eat: -   For supper, I typically eat: -   For snack(s), I " typically eat: -   I have made the following changes to my activity/exercise since my last visit: Gardening   With regards to my activity/exercise, I am still struggling with: Just need to do more       MEDICATIONS:   Current Outpatient Prescriptions   Medication     aspirin 81 MG EC tablet     Flaxseed, Linseed, (FLAXSEED OIL PO)     lisinopril (PRINIVIL/ZESTRIL) 20 MG tablet     Multiple Vitamin (MULTIVITAMINS PO)     NIFEdipine ER osmotic (PROCARDIA XL) 90 MG 24 hr tablet     phentermine (ADIPEX-P) 37.5 MG tablet     topiramate (TOPAMAX) 50 MG tablet     No current facility-administered medications for this visit.        Weight Loss Medication History Reviewed With Patient 7/24/2018   Which weight loss medications are you currently taking on a regular basis?  Qysmia (phentermine/topiramate)   If you are not taking a weight loss medication that was prescribed to you, please indicate why: -   Are you having any side effects from the weight loss medication that we have prescribed you? Yes   If you are having side effects please describe: Tingling in feet, soda tastes funny       ASSESSMENT:   Morbid obesity, improving on topiramate and phentermine    PLAN:   Continue topiramate 50mg daily  Refill phentermine 18.75mg daily    Food goal: to continue to plan and pack foods on the go and have evening meal     Activity goal: to keep walking at work 150 minutes weekly     Medication goal: to restart phentermine and topiramate     Commuting goal: to start listening to Audible books such as The Volumetrics Eating Plan and management classes    FOLLOW-UP:    12 weeks.    Time: 25 min spent on evaluation, management, counseling, education, & motivational interviewing with greater than 50 % of the total time was spent on counseling and coordinating care    Sincerely,    Umu Mahoney MD

## 2018-07-24 NOTE — PROGRESS NOTES
"    Return Medical Weight Management Note     Holli Saxena  MRN:  [id number]  :  1975  ALIA:  2018    Dear Abundio, Kenyatta Garcia,    I had the pleasure of seeing your patient Holli Saxena.  She is a 43 year old female who I am continuing to see for treatment of obesity related to:  HTN, hyperlipidemia, and PCOS    INTERVAL HISTORY:  Taking phentermine and topiramate without significant side effects. Pop tastes metallic, so she is pleased about this and she is tolerating the tingling in her feet because the medications are helping with weight loss. 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 Managing stress better. Eating small vegetable snacks at work and having meal in evening and this is working well for her. She wants to continue the medications.    CURRENT WEIGHT:   232 lbs 3.2 oz    Wt Readings from Last 4 Encounters:   18 105.3 kg (232 lb 3.2 oz)   18 111.2 kg (245 lb 3.2 oz)   18 114.3 kg (252 lb)   18 115.9 kg (255 lb 9.6 oz)     Height:  5' 10\"  Body Mass Index:  Body mass index is 33.32 kg/(m^2).  Vitals:  B/P: 117/76, P: 95    Initial consult weight was 225 on 7/12/10.  Weight change since last seen on 2018 is down 13 pounds.   Total gain is 7 pounds.    Diet and Activity Changes Since Last Visit Reviewed With Patient 2018   I have made the following changes to my diet since my last visit: Intermittent fasting   With regards to my diet, I am still struggling with: Going good right now   For breakfast, I typically eat: -   For lunch, I typically eat: -   For supper, I typically eat: -   For snack(s), I typically eat: -   I have made the following changes to my activity/exercise since my last visit: Gardening   With regards to my activity/exercise, I am still struggling with: Just need to do more       MEDICATIONS:   Current Outpatient Prescriptions   Medication     aspirin 81 MG EC tablet     " Flaxseed, Linseed, (FLAXSEED OIL PO)     lisinopril (PRINIVIL/ZESTRIL) 20 MG tablet     Multiple Vitamin (MULTIVITAMINS PO)     NIFEdipine ER osmotic (PROCARDIA XL) 90 MG 24 hr tablet     phentermine (ADIPEX-P) 37.5 MG tablet     topiramate (TOPAMAX) 50 MG tablet     No current facility-administered medications for this visit.        Weight Loss Medication History Reviewed With Patient 7/24/2018   Which weight loss medications are you currently taking on a regular basis?  Qysmia (phentermine/topiramate)   If you are not taking a weight loss medication that was prescribed to you, please indicate why: -   Are you having any side effects from the weight loss medication that we have prescribed you? Yes   If you are having side effects please describe: Tingling in feet, soda tastes funny       ASSESSMENT:   Morbid obesity, improving on topiramate and phentermine    PLAN:   Continue topiramate 50mg daily  Refill phentermine 18.75mg daily    Food goal: to continue to plan and pack foods on the go and have evening meal     Activity goal: to keep walking at work 150 minutes weekly     Medication goal: to restart phentermine and topiramate     Commuting goal: to start listening to Audible books such as [employer] Eating Plan and management classes    FOLLOW-UP:    12 weeks.    Time: 25 min spent on evaluation, management, counseling, education, & motivational interviewing with greater than 50 % of the total time was spent on counseling and coordinating care    Sincerely,    Umu Mahoney MD     Answers for HPI/ROS submitted by the patient on 7/24/2018   General Symptoms: No  Skin Symptoms: No  HENT Symptoms: No  EYE SYMPTOMS: No  HEART SYMPTOMS: No  LUNG SYMPTOMS: No  INTESTINAL SYMPTOMS: No  URINARY SYMPTOMS: No  GYNECOLOGIC SYMPTOMS: No  BREAST SYMPTOMS: No  SKELETAL SYMPTOMS: No  BLOOD SYMPTOMS: No  NERVOUS SYSTEM SYMPTOMS: No  MENTAL HEALTH SYMPTOMS: No

## 2018-09-17 DIAGNOSIS — I10 BENIGN ESSENTIAL HYPERTENSION: ICD-10-CM

## 2018-09-17 RX ORDER — NIFEDIPINE 90 MG/1
90 TABLET, EXTENDED RELEASE ORAL DAILY
Qty: 30 TABLET | Refills: 0 | Status: SHIPPED | OUTPATIENT
Start: 2018-09-17 | End: 2018-10-18

## 2018-09-17 NOTE — TELEPHONE ENCOUNTER
Please call Mariah from [facility] in [place] regarding a refill request for this patient for Nifedipine.

## 2018-10-07 DIAGNOSIS — I10 BENIGN ESSENTIAL HYPERTENSION: ICD-10-CM

## 2018-10-11 RX ORDER — LISINOPRIL 20 MG/1
TABLET ORAL
Qty: 90 TABLET | Refills: 0 | Status: SHIPPED | OUTPATIENT
Start: 2018-10-11 | End: 2019-01-14

## 2018-10-11 NOTE — TELEPHONE ENCOUNTER
Received refill request for lisinopril. Last seen 1/2018 where plan was to continue this med. Will send enough to get her through until 1/2019 when she will need to be seen again.

## 2018-10-18 DIAGNOSIS — I10 BENIGN ESSENTIAL HYPERTENSION: ICD-10-CM

## 2018-10-18 RX ORDER — PHENTERMINE HYDROCHLORIDE 37.5 MG/1
TABLET ORAL
Qty: 16 TABLET | Refills: 3 | Status: CANCELLED | OUTPATIENT
Start: 2018-10-18

## 2018-10-18 RX ORDER — NIFEDIPINE 90 MG/1
90 TABLET, EXTENDED RELEASE ORAL DAILY
Qty: 30 TABLET | Refills: 0 | Status: SHIPPED | OUTPATIENT
Start: 2018-10-18 | End: 2018-11-06

## 2018-10-18 NOTE — TELEPHONE ENCOUNTER
phentermine (ADIPEX-P) 37.5 MG tablet      Last Written Prescription Date:  7-24-18  Last Fill Quantity: 16,   # refills: 3  Last Office Visit : 7-24-18  Future Office visit:  11-6-18    Routing refill request to provider for review/approval because:  Controlled medication.      Kathleen M Doege RN

## 2018-10-18 NOTE — TELEPHONE ENCOUNTER
Per Amber Orozco, will send 30 day supply until seen by Dr. Mahoney next month. Will route for sign off.

## 2018-10-22 NOTE — TELEPHONE ENCOUNTER
Called and left message for patient. Refills available through November from script given in July. Patient has follow up appointment next month and will get additional refills at that time.

## 2018-10-23 ENCOUNTER — TELEPHONE (OUTPATIENT)
Dept: ENDOCRINOLOGY | Facility: CLINIC | Age: 43
End: 2018-10-23

## 2018-10-23 NOTE — TELEPHONE ENCOUNTER
Patient called asking about the status on her NIFEdipine ER osmotic (PROCARDIA XL) 90 MG 24 hr tablet. Patient states she is unable to get prescription from pharmacy. Called pharmacy and spoke with pharmacist. They do not have specific formula in store. Calling to arrange 30 day refill from alternate pharmacy. Called patient back and left message to notify.

## 2018-11-06 ENCOUNTER — OFFICE VISIT (OUTPATIENT)
Dept: ENDOCRINOLOGY | Facility: CLINIC | Age: 43
End: 2018-11-06
Payer: COMMERCIAL

## 2018-11-06 VITALS
DIASTOLIC BLOOD PRESSURE: 82 MMHG | BODY MASS INDEX: 32.07 KG/M2 | WEIGHT: 224 LBS | TEMPERATURE: 97.9 F | SYSTOLIC BLOOD PRESSURE: 122 MMHG | HEIGHT: 70 IN | HEART RATE: 99 BPM | OXYGEN SATURATION: 99 %

## 2018-11-06 DIAGNOSIS — E66.01 MORBID OBESITY (H): Primary | ICD-10-CM

## 2018-11-06 DIAGNOSIS — I10 BENIGN ESSENTIAL HYPERTENSION: ICD-10-CM

## 2018-11-06 RX ORDER — NIFEDIPINE 90 MG/1
90 TABLET, EXTENDED RELEASE ORAL DAILY
Qty: 90 TABLET | Refills: 0 | Status: SHIPPED | OUTPATIENT
Start: 2018-11-06 | End: 2019-03-12

## 2018-11-06 RX ORDER — PHENTERMINE HYDROCHLORIDE 37.5 MG/1
TABLET ORAL
Qty: 16 TABLET | Refills: 3 | Status: SHIPPED | OUTPATIENT
Start: 2018-11-06 | End: 2019-03-15

## 2018-11-06 ASSESSMENT — PAIN SCALES - GENERAL: PAINLEVEL: NO PAIN (0)

## 2018-11-06 NOTE — PATIENT INSTRUCTIONS
Food goal: to continue to plan and pack foods on the go and have evening meal      Activity goal: to keep walking at work 150 minutes weekly      Medication goal: to phentermine and topiramate      Commuting goal: to start listening to Audible books such as the [employer] Eating Plan and management classes

## 2018-11-06 NOTE — LETTER
"2018       RE: Holli Saxena  [address]  [facility] [address]     Dear Colleague,    Thank you for referring your patient, Holli Saxena, to the [facility] MEDICAL WEIGHT MANAGEMENT at [facility]. Please see a copy of my visit note below.        Return Medical Weight Management Note     Holli Saxena  MRN:  [id number]  :  1975  ALIA:  2018    Dear Kenyatta Del Castillo,    I had the pleasure of seeing your patient Holli Saxena.  She is a 43 year old female who I am continuing to see for treatment of obesity related to:    Hypertension  Hyperlipidemia   PCOS    INTERVAL HISTORY:  Weight down 8 lbs this interval on meds phentermine and topiramate without sig side effects. She is asking about if she should take 81 mg or 325 mg of ASA to prevent CVD issues based on [employer] article from a couple of Sundays ago that was published in the Lancet.    CURRENT WEIGHT:   224 lbs 0 oz    Wt Readings from Last 4 Encounters:   18 224 lb   18 232 lb 3.2 oz   18 245 lb 3.2 oz   18 252 lb     Height:  5' 10\"  Body Mass Index:  Body mass index is 32.14 kg/(m^2).  Vitals:  B/P: 122/82, P: 99    Initial consult weight was 225 on 7/12/10.  Weight change since last seen on 18 is down 8 pounds.   Total loss is 1 pounds.    Diet and Activity Changes Since Last Visit Reviewed With Patient 2018   I have made the following changes to my diet since my last visit: Intermittent fasting   With regards to my diet, I am still struggling with: Going good right now   For breakfast, I typically eat: -   For lunch, I typically eat: -   For supper, I typically eat: -   For snack(s), I typically eat: -   I have made the following changes to my activity/exercise since my last visit: Gardening   With regards to my activity/exercise, I am still struggling with: Just need to do more       MEDICATIONS:   Current Outpatient Prescriptions   Medication     " aspirin 81 MG EC tablet     Flaxseed, Linseed, (FLAXSEED OIL PO)     lisinopril (PRINIVIL/ZESTRIL) 20 MG tablet     Multiple Vitamin (MULTIVITAMINS PO)     NIFEdipine ER osmotic (PROCARDIA XL) 90 MG 24 hr tablet     phentermine (ADIPEX-P) 37.5 MG tablet     topiramate (TOPAMAX) 50 MG tablet     No current facility-administered medications for this visit.        Weight Loss Medication History Reviewed With Patient 11/6/2018   Which weight loss medications are you currently taking on a regular basis?  Qysmia (phentermine/topiramate)   If you are not taking a weight loss medication that was prescribed to you, please indicate why: -   Are you having any side effects from the weight loss medication that we have prescribed you? No   If you are having side effects please describe: -       ASSESSMENT:   MO, improving on current regime, net down 1 lbs over 8 years, post-baby  Refilled Nifedipine     PLAN:   ASA recc for ASA dose to prevent CVD in overweight/obese patients - defer to Dr. Del Castillo and will review Lancet paper and await ACC and USPTF reccs - in mean time, continue current dose of 81 mg daily    Food goal: to continue to plan and pack foods on the go and have evening meal      Activity goal: to keep walking at work 150 minutes weekly      Medication goal: to continue phentermine and topiramate      Commuting goal: to start listening to Audible books such as the [employer] Eating Plan and management classes    FOLLOW-UP:    12 weeks.    Time: 25 min spent on evaluation, management, counseling, education, & motivational interviewing with greater than 50 % of the total time was spent on counseling and coordinating care    Sincerely,    Umu Mahoney MD, MPH  Attending Physician  Diabetes/Endocrinology/Metabolism

## 2018-11-06 NOTE — PROGRESS NOTES
"    Return Medical Weight Management Note     Holli Saxena  MRN:  [id number]  :  1975  ALIA:  2018    Dear Kenyatta Del Castillo,    I had the pleasure of seeing your patient Holli Saxena.  She is a 43 year old female who I am continuing to see for treatment of obesity related to:    Hypertension  Hyperlipidemia   PCOS    INTERVAL HISTORY:  Weight down 8 lbs this interval on meds phentermine and topiramate without sig side effects. She is asking about if she should take 81 mg or 325 mg of ASA to prevent CVD issues based on [employer] article from a couple of Sundays ago that was published in the Lancet.    CURRENT WEIGHT:   224 lbs 0 oz    Wt Readings from Last 4 Encounters:   18 224 lb   18 232 lb 3.2 oz   18 245 lb 3.2 oz   18 252 lb     Height:  5' 10\"  Body Mass Index:  Body mass index is 32.14 kg/(m^2).  Vitals:  B/P: 122/82, P: 99    Initial consult weight was 225 on 7/12/10.  Weight change since last seen on 18 is down 8 pounds.   Total loss is 1 pounds.    Diet and Activity Changes Since Last Visit Reviewed With Patient 2018   I have made the following changes to my diet since my last visit: Intermittent fasting   With regards to my diet, I am still struggling with: Going good right now   For breakfast, I typically eat: -   For lunch, I typically eat: -   For supper, I typically eat: -   For snack(s), I typically eat: -   I have made the following changes to my activity/exercise since my last visit: Gardening   With regards to my activity/exercise, I am still struggling with: Just need to do more       MEDICATIONS:   Current Outpatient Prescriptions   Medication     aspirin 81 MG EC tablet     Flaxseed, Linseed, (FLAXSEED OIL PO)     lisinopril (PRINIVIL/ZESTRIL) 20 MG tablet     Multiple Vitamin (MULTIVITAMINS PO)     NIFEdipine ER osmotic (PROCARDIA XL) 90 MG 24 hr tablet     phentermine (ADIPEX-P) 37.5 MG tablet     topiramate (TOPAMAX) 50 MG tablet "     No current facility-administered medications for this visit.        Weight Loss Medication History Reviewed With Patient 11/6/2018   Which weight loss medications are you currently taking on a regular basis?  Qysmia (phentermine/topiramate)   If you are not taking a weight loss medication that was prescribed to you, please indicate why: -   Are you having any side effects from the weight loss medication that we have prescribed you? No   If you are having side effects please describe: -       ASSESSMENT:   MO, improving on current regime, net down 1 lbs over 8 years, post-baby  Refilled Nifedipine     PLAN:   ASA recc for ASA dose to prevent CVD in overweight/obese patients - defer to Dr. Del Castillo and will review Lancet paper and await ACC and USPTF reccs - in mean time, continue current dose of 81 mg daily    Food goal: to continue to plan and pack foods on the go and have evening meal      Activity goal: to keep walking at work 150 minutes weekly      Medication goal: to continue phentermine and topiramate      Commuting goal: to start listening to Audible books such as the [employer] Eating Plan and management classes    FOLLOW-UP:    12 weeks.    Time: 25 min spent on evaluation, management, counseling, education, & motivational interviewing with greater than 50% of the total time was spent on counseling and coordinating care    Sincerely,    Umu Mahoney MD, MPH  Attending Physician  Diabetes/Endocrinology/Metabolism    Addendum:  Recommend increasing ASA from 81 to 325 mg for vascular risk reduction

## 2018-11-06 NOTE — MR AVS SNAPSHOT
After Visit Summary   11/6/2018    Holli Saxena    MRN: [id number]           Patient Information     Date Of Birth          1975        Visit Information        Provider Department      11/6/2018 1:20 PM Umu Mahoney MD  Health Medical Weight Management        Today's Diagnoses     Morbid obesity (H)    -  1      Care Instructions    Food goal: to continue to plan and pack foods on the go and have evening meal      Activity goal: to keep walking at work 150 minutes weekly      Medication goal: to restart phentermine and topiramate      Commuting goal: to start listening to Audible books such as the [employer] Eating Plan and management classes          Follow-ups after your visit        Follow-up notes from your care team     Return in about 3 months (around 2/6/2019).      Who to contact     Please call your clinic at [phone] to:    Ask questions about your health    Make or cancel appointments    Discuss your medicines    Learn about your test results    Speak to your doctor            Additional Information About Your Visit        [employer] Information     [employer] gives you secure access to your electronic health record. If you see a primary care provider, you can also send messages to your care team and make appointments. If you have questions, please call your primary care clinic.  If you do not have a primary care provider, please call [phone] and they will assist you.      [employer] is an electronic gateway that provides easy, online access to your medical records. With [employer], you can request a clinic appointment, read your test results, renew a prescription or communicate with your care team.     To access your existing account, please contact your [facility] or call [phone] for assistance.        Care EveryWhere ID     This is your Care EveryWhere ID. This could be used by other organizations to access your [facility]  "records  [id number]        Your Vitals Were     Pulse Temperature Height Pulse Oximetry BMI (Body Mass Index)       99 97.9  F (36.6  C) (Oral) 5' 10\" 99% 32.14 kg/m2        Blood Pressure from Last 3 Encounters:   11/06/18 122/82   07/24/18 117/76   04/17/18 121/85    Weight from Last 3 Encounters:   11/06/18 224 lb   07/24/18 232 lb 3.2 oz   04/17/18 245 lb 3.2 oz              Today, you had the following     No orders found for display         Where to get your medicines      Some of these will need a paper prescription and others can be bought over the counter.  Ask your nurse if you have questions.     Bring a paper prescription for each of these medications     phentermine 37.5 MG tablet          Primary Care Provider Office Phone # Fax #    Kenyatta Jose Del Castillo [phone]       [facility] [address]  [facility] [address]        Equal Access to Services     ANYA [facility] : Hadii jesenia ku hadasho Soomaali, waaxda luqadaha, qaybta kaalmada adeegyada, waxay catherinein richard britton . So [facility] [phone].    ATENCIÓN: Si habla español, tiene a cormier disposición servicios gratuitos de asistencia lingüística. Llame al [phone].    We comply with applicable federal civil rights laws and [place] laws. We do not discriminate on the basis of race, color, national origin, age, disability, sex, sexual orientation, or gender identity.            Thank you!     Thank you for choosing [facility] WEIGHT MANAGEMENT  for your care. Our goal is always to provide you with excellent care. Hearing back from our patients is one way we can continue to improve our services. Please take a few minutes to complete the written survey that you may receive in the mail after your visit with us. Thank you!             Your Updated Medication List - Protect others around you: Learn how to safely use, store and throw away your medicines at www.disposemymeds.org.          This list is " accurate as of 11/6/18  1:55 PM.  Always use your most recent med list.                   Brand Name Dispense Instructions for use Diagnosis    aspirin 81 MG EC tablet     90 tablet    Take 1 tablet (81 mg) by mouth daily    Benign essential hypertension       FLAXSEED OIL PO      Take as directed        lisinopril 20 MG tablet    PRINIVIL/ZESTRIL    90 tablet    TAKE 1 TABLET BY MOUTH DAILY    Benign essential hypertension       MULTIVITAMINS PO      Take 1 tablet by mouth daily.        NIFEdipine ER osmotic 90 MG 24 hr tablet    PROCARDIA XL    30 tablet    Take 1 tablet (90 mg) by mouth daily    Benign essential hypertension       phentermine 37.5 MG tablet    ADIPEX-P    16 tablet    Take 1/2 tab in am    Morbid obesity (H)       topiramate 50 MG tablet    TOPAMAX    90 tablet    Take 1 tablet (50 mg) by mouth daily    Class 2 obesity due to excess calories with serious comorbidity in adult, unspecified BMI

## 2018-11-06 NOTE — NURSING NOTE
"Chief Complaint   Patient presents with     Weight Problem     Return Weight Managment        Vitals:    11/06/18 1318   BP: 122/82   Pulse: 99   Temp: 97.9  F (36.6  C)   TempSrc: Oral   SpO2: 99%   Weight: 224 lb   Height: 5' 10\"       Body mass index is 32.14 kg/(m^2).    Lee Pedro on 11/6/2018 at 1:23 PM                     "

## 2019-01-14 DIAGNOSIS — I10 BENIGN ESSENTIAL HYPERTENSION: ICD-10-CM

## 2019-01-14 RX ORDER — LISINOPRIL 20 MG/1
20 TABLET ORAL DAILY
Qty: 90 TABLET | Refills: 0 | Status: SHIPPED | OUTPATIENT
Start: 2019-01-14 | End: 2019-03-15

## 2019-01-14 NOTE — TELEPHONE ENCOUNTER
Received refill request for lisinopril.  Last in clinic 1/2018 so due for ofv. Left vm will send short term refill to cover until seen at [facility].

## 2019-02-01 DIAGNOSIS — E66.01 MORBID OBESITY (H): ICD-10-CM

## 2019-02-01 RX ORDER — PHENTERMINE HYDROCHLORIDE 37.5 MG/1
TABLET ORAL
Qty: 16 TABLET | Refills: 3 | OUTPATIENT
Start: 2019-02-01

## 2019-02-04 DIAGNOSIS — E66.01 MORBID OBESITY (H): ICD-10-CM

## 2019-02-04 RX ORDER — PHENTERMINE HYDROCHLORIDE 37.5 MG/1
TABLET ORAL
Qty: 16 TABLET | Refills: 3 | Status: CANCELLED | OUTPATIENT
Start: 2019-02-04

## 2019-02-04 NOTE — TELEPHONE ENCOUNTER
Send patient mychart message advising her to schedule an appointment. Refill denied until appointment is made.

## 2019-02-15 ENCOUNTER — HEALTH MAINTENANCE LETTER (OUTPATIENT)
Age: 44
End: 2019-02-15

## 2019-03-05 ENCOUNTER — TELEPHONE (OUTPATIENT)
Dept: ENDOCRINOLOGY | Facility: CLINIC | Age: 44
End: 2019-03-05

## 2019-03-05 NOTE — TELEPHONE ENCOUNTER
Reason for call:  Medication   If this is a refill request, has the caller requested the refill from the pharmacy already? No  Will the patient be using a [facility]? No  Name of the pharmacy and phone number for the current request: [facility] [id number] [place], [place] -     Name of the medication requested: phentermine (ADIPEX-P) 37.5 MG tablet    Other request:     Phone number to reach patient:  Other phone number:  [phone]    Best Time:  anytime    Can we leave a detailed message on this number?  YES

## 2019-03-05 NOTE — TELEPHONE ENCOUNTER
Received refill request for phentermine    . Patient needs appointment scheduled prior to any refills. Clinic Coordinator notified and will follow up with the patient as appropriate. The pharmacy has been notified that the medication will not be refilled prior to an appointment being scheduled.      Scheduling has been notified to contact the pt for appointment.

## 2019-03-07 DIAGNOSIS — I10 BENIGN ESSENTIAL HYPERTENSION: ICD-10-CM

## 2019-03-07 NOTE — TELEPHONE ENCOUNTER
Reason for call:  Other   Patient called regarding (reason for call): call back  Additional comments: Refill request for patients medication - NIFEDIPINE     Phone number to reach patient:  Other phone number:  [phone]    Best Time:  Before 9PM    Can we leave a detailed message on this number?  Not Applicable

## 2019-03-11 NOTE — TELEPHONE ENCOUNTER
Reason for call:  Other   Patient called regarding (reason for call): Calling to check on medication  Additional comments: Wants a call back.    Phone number to reach patient:  Other phone number: [phone]    Best Time:  9-9 pm    Can we leave a detailed message on this number?  Not Applicable

## 2019-03-12 RX ORDER — NIFEDIPINE 90 MG/1
90 TABLET, EXTENDED RELEASE ORAL DAILY
Qty: 30 TABLET | Refills: 0 | Status: SHIPPED | OUTPATIENT
Start: 2019-03-12 | End: 2019-03-15

## 2019-03-12 NOTE — TELEPHONE ENCOUNTER
Last Clinic Visit: 11/6/2018  [facility] Medical Weight Management  Next appointment: 3-15-19  Refill sent pt notified.

## 2019-03-15 ENCOUNTER — OFFICE VISIT (OUTPATIENT)
Dept: ENDOCRINOLOGY | Facility: CLINIC | Age: 44
End: 2019-03-15
Payer: COMMERCIAL

## 2019-03-15 VITALS
HEART RATE: 86 BPM | DIASTOLIC BLOOD PRESSURE: 81 MMHG | SYSTOLIC BLOOD PRESSURE: 115 MMHG | HEIGHT: 70 IN | BODY MASS INDEX: 33.67 KG/M2 | OXYGEN SATURATION: 100 % | WEIGHT: 235.2 LBS

## 2019-03-15 DIAGNOSIS — I10 BENIGN ESSENTIAL HYPERTENSION: ICD-10-CM

## 2019-03-15 DIAGNOSIS — E66.811 CLASS 1 OBESITY WITH SERIOUS COMORBIDITY AND BODY MASS INDEX (BMI) OF 34.0 TO 34.9 IN ADULT, UNSPECIFIED OBESITY TYPE: ICD-10-CM

## 2019-03-15 DIAGNOSIS — E66.01 MORBID OBESITY (H): ICD-10-CM

## 2019-03-15 DIAGNOSIS — I10 HYPERTENSION, UNSPECIFIED TYPE: ICD-10-CM

## 2019-03-15 DIAGNOSIS — I10 HYPERTENSION, UNSPECIFIED TYPE: Primary | ICD-10-CM

## 2019-03-15 LAB
ANION GAP SERPL CALCULATED.3IONS-SCNC: 8 MMOL/L (ref 3–14)
BUN SERPL-MCNC: 15 MG/DL (ref 7–30)
CALCIUM SERPL-MCNC: 8.6 MG/DL (ref 8.5–10.1)
CHLORIDE SERPL-SCNC: 104 MMOL/L (ref 94–109)
CO2 SERPL-SCNC: 25 MMOL/L (ref 20–32)
CREAT SERPL-MCNC: 0.79 MG/DL (ref 0.52–1.04)
GFR SERPL CREATININE-BSD FRML MDRD: >90 ML/MIN/{1.73_M2}
GLUCOSE SERPL-MCNC: 91 MG/DL (ref 70–99)
POTASSIUM SERPL-SCNC: 3.8 MMOL/L (ref 3.4–5.3)
SODIUM SERPL-SCNC: 137 MMOL/L (ref 133–144)

## 2019-03-15 RX ORDER — TOPIRAMATE 50 MG/1
50 TABLET, FILM COATED ORAL DAILY
Qty: 90 TABLET | Refills: 3 | Status: SHIPPED | OUTPATIENT
Start: 2019-03-15 | End: 2020-07-24

## 2019-03-15 RX ORDER — PHENTERMINE HYDROCHLORIDE 37.5 MG/1
TABLET ORAL
Qty: 16 TABLET | Refills: 3 | Status: SHIPPED | OUTPATIENT
Start: 2019-03-15 | End: 2020-07-24

## 2019-03-15 RX ORDER — NIFEDIPINE 90 MG/1
90 TABLET, EXTENDED RELEASE ORAL DAILY
Qty: 90 TABLET | Refills: 3 | Status: SHIPPED | OUTPATIENT
Start: 2019-03-15 | End: 2020-04-07

## 2019-03-15 RX ORDER — LISINOPRIL 20 MG/1
20 TABLET ORAL DAILY
Qty: 90 TABLET | Refills: 3 | Status: SHIPPED | OUTPATIENT
Start: 2019-03-15 | End: 2020-04-10

## 2019-03-15 ASSESSMENT — MIFFLIN-ST. JEOR: SCORE: 1807.11

## 2019-03-15 NOTE — PROGRESS NOTES
"    Return Medical Weight Management Note     Holli Saxena  MRN:  [id number]  :  1975  ALIA:  3/15/2019    Dear Abundio, Kenyatta Garcia,    I had the pleasure of seeing your patient Holli Saxena.  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 She frequently misses her phentermine and has not been doing as well as meal planning and has been eating some unwanted foods.    CURRENT WEIGHT:   235 lbs 3.2 oz    Wt Readings from Last 4 Encounters:   03/15/19 106.7 kg (235 lb 3.2 oz)   18 101.6 kg (224 lb)   18 105.3 kg (232 lb 3.2 oz)   18 111.2 kg (245 lb 3.2 oz)     Height:  5' 10\"  Body Mass Index:  Body mass index is 33.75 kg/m .  Vitals:  B/P: 115/81, P: 86    Initial consult weight was 225 on 7/12/10.  Weight change since last seen on 2018 is up 11 pounds.   Total gain is 10 pounds.    Diet and Activity Changes Since Last Visit Reviewed With Patient 2018   I have made the following changes to my diet since my last visit: Intermittent fasting   With regards to my diet, I am still struggling with: Going good right now   For breakfast, I typically eat: -   For lunch, I typically eat: -   For supper, I typically eat: -   For snack(s), I typically eat: -   I have made the following changes to my activity/exercise since my last visit: Gardening   With regards to my activity/exercise, I am still struggling with: Just need to do more     MEDICATIONS:   Current Outpatient Medications   Medication     aspirin 81 MG EC tablet     Flaxseed, Linseed, (FLAXSEED OIL PO)     lisinopril (PRINIVIL/ZESTRIL) 20 MG tablet     Multiple Vitamin (MULTIVITAMINS PO)     NIFEdipine ER OSMOTIC (PROCARDIA XL) 90 MG 24 hr tablet     phentermine " (ADIPEX-P) 37.5 MG tablet     topiramate (TOPAMAX) 50 MG tablet     No current facility-administered medications for this visit.      Weight Loss Medication History Reviewed With Patient 3/15/2019   Which weight loss medications are you currently taking on a regular basis?  Phentermine, Topamax (topiramate)   If you are not taking a weight loss medication that was prescribed to you, please indicate why: -   Are you having any side effects from the weight loss medication that we have prescribed you? Yes   If you are having side effects please describe: Tingling feet     ASSESSMENT:   Obesity, weight up this interval, not taking phentermine regularly, but is taking topiramate. Work stress is huge and has caused some decompensation.  HTN, refilled meds, annual creatinine due    PLAN:     Patient Instructions:  PLEASE GO TO THE LAB ON 1ST FLOOR TODAY    Food goal: to continue to plan and pack foods on the go and have evening meal      Activity goal: to keep walking at work 150 minutes weekly      Medication goal: to remember to take phentermine daily and to continue topiramate 50 mg daily      Commuting goal: to continue listening to Audible books such as the [employer] Eating Plan and management classes    Therapy goal for stress: health psychology?    Scheduling with a Health Psychologist  *Our Health Psychologists prefer that the patient reaches out to them directly to schedule an appointment. After choosing one of the providers listed below, you will more than likely reach their voicemail, as they are typically seeing patients during normal business hours. Make sure to leave your name, contact number, and the name of the doctor who referred you. They will try to get back to you within 24-48 business hours.     Dr. Jorge Torres: [phone]  Dr. Mayra Garner: [phone]  Dr. Bibi Leach: [phone]      FOLLOW-UP:    12 weeks.    Time: 25 min spent on evaluation, management, counseling, education, &  motivational interviewing with greater than 50% of the total time was spent on counseling and coordinating care    Sincerely,    DIXON ROMANO MD, MPH  Endocrinologist    Addendum: labs: BMP was fine.

## 2019-03-15 NOTE — PATIENT INSTRUCTIONS
PLEASE GO TO THE LAB ON 1ST FLOOR TODAY    Food goal: to continue to plan and pack foods on the go and have evening meal      Activity goal: to keep walking at work 150 minutes weekly      Medication goal: to remember to take phentermine daily and to continue topiramate 50 mg daily      Commuting goal: to continue listening to Audible books such as the [employer] Eating Plan and management classes    Therapy goal for stress: health psychology?    Scheduling with a Health Psychologist  *Our Health Psychologists prefer that the patient reaches out to them directly to schedule an appointment. After choosing one of the providers listed below, you will more than likely reach their voicemail, as they are typically seeing patients during normal business hours. Make sure to leave your name, contact number, and the name of the doctor who referred you. They will try to get back to you within 24-48 business hours.     Dr. Jorge Torres: [phone]  Dr. Mayra Garner: [phone]  Dr. Bibi Leach: [phone]

## 2019-03-15 NOTE — NURSING NOTE
"  Chief Complaint   Patient presents with     Weight Problem     RMWM     Vitals:    03/15/19 1055   BP: 115/81   Pulse: 86   SpO2: 100%   Weight: 106.7 kg (235 lb 3.2 oz)   Height: 1.778 m (5' 10\")     Body mass index is 33.75 kg/m .  Consuelo Aleman CMA    "

## 2019-03-15 NOTE — LETTER
"  RE: Holli Saxena  [address] [facility] [address]     Dear Colleague,    Thank you for referring your patient, Holli Saxena, to the [facility]NAGEMENT at [facility]. Please see a copy of my visit note below.    Return Medical Weight Management Note     Holli Saxena  MRN:  [id number]  :  1975  ALIA:  3/15/2019    Dear Kenyatta Del Castillo,    I had the pleasure of seeing your patient Holli Saxena.  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 She frequently misses her phentermine and has not been doing as well as meal planning and has been eating some unwanted foods.    CURRENT WEIGHT:   235 lbs 3.2 oz    Wt Readings from Last 4 Encounters:   03/15/19 106.7 kg (235 lb 3.2 oz)   18 101.6 kg (224 lb)   18 105.3 kg (232 lb 3.2 oz)   18 111.2 kg (245 lb 3.2 oz)     Height:  5' 10\"  Body Mass Index:  Body mass index is 33.75 kg/m .  Vitals:  B/P: 115/81, P: 86    Initial consult weight was 225 on 7/12/10.  Weight change since last seen on 2018 is up 11 pounds.   Total gain is 10 pounds.    Diet and Activity Changes Since Last Visit Reviewed With Patient 2018   I have made the following changes to my diet since my last visit: Intermittent fasting   With regards to my diet, I am still struggling with: Going good right now   For breakfast, I typically eat: -   For lunch, I typically eat: -   For supper, I typically eat: -   For snack(s), I typically eat: -   I have made the following changes to my activity/exercise since my last visit: Gardening   With regards to my activity/exercise, I am still struggling with: Just need to do more     MEDICATIONS: "   Current Outpatient Medications   Medication     aspirin 81 MG EC tablet     Flaxseed, Linseed, (FLAXSEED OIL PO)     lisinopril (PRINIVIL/ZESTRIL) 20 MG tablet     Multiple Vitamin (MULTIVITAMINS PO)     NIFEdipine ER OSMOTIC (PROCARDIA XL) 90 MG 24 hr tablet     phentermine (ADIPEX-P) 37.5 MG tablet     topiramate (TOPAMAX) 50 MG tablet     No current facility-administered medications for this visit.      Weight Loss Medication History Reviewed With Patient 3/15/2019   Which weight loss medications are you currently taking on a regular basis?  Phentermine, Topamax (topiramate)   If you are not taking a weight loss medication that was prescribed to you, please indicate why: -   Are you having any side effects from the weight loss medication that we have prescribed you? Yes   If you are having side effects please describe: Tingling feet     ASSESSMENT:   Obesity, weight up this interval, not taking phentermine regularly, but is taking topiramate. Work stress is huge and has caused some decompensation.  HTN, refilled meds, annual creatinine due    PLAN:     Patient Instructions:  PLEASE GO TO THE LAB ON 1ST FLOOR TODAY    Food goal: to continue to plan and pack foods on the go and have evening meal      Activity goal: to keep walking at work 150 minutes weekly      Medication goal: to remember to take phentermine daily and to continue topiramate 50 mg daily      Commuting goal: to continue listening to Audible books such as the [employer] Eating Plan and management classes    Therapy goal for stress: health psychology?    Scheduling with a Health Psychologist  *Our Health Psychologists prefer that the patient reaches out to them directly to schedule an appointment. After choosing one of the providers listed below, you will more than likely reach their voicemail, as they are typically seeing patients during normal business hours. Make sure to leave your name, contact number, and the name of the doctor who referred  you. They will try to get back to you within 24-48 business hours.     Dr. Jorge Torres: [phone]  Dr. Mayra Garner: [phone]  Dr. Bibi Leach: [phone]      FOLLOW-UP:    12 weeks.    Time: 25 min spent on evaluation, management, counseling, education, & motivational interviewing with greater than 50% of the total time was spent on counseling and coordinating care    Sincerely,    DIXON ROMANO MD, MPH  Endocrinologist    Addendum: labs: BMP was fine.

## 2019-04-27 NOTE — PATIENT INSTRUCTIONS
Food goal: to continue to plan and pack foods on the go and have evening meal     Activity goal: to keep walking at work 150 minutes weekly     Medication goal: to restart phentermine and topiramate     Commuting goal: to start listening to Audible books such as [employer] Eating Plan and management classes   EMS

## 2020-03-01 ENCOUNTER — HEALTH MAINTENANCE LETTER (OUTPATIENT)
Age: 45
End: 2020-03-01

## 2020-04-06 DIAGNOSIS — E66.01 MORBID OBESITY (H): ICD-10-CM

## 2020-04-06 DIAGNOSIS — I10 BENIGN ESSENTIAL HYPERTENSION: ICD-10-CM

## 2020-04-06 DIAGNOSIS — E66.811 CLASS 1 OBESITY WITH SERIOUS COMORBIDITY AND BODY MASS INDEX (BMI) OF 34.0 TO 34.9 IN ADULT, UNSPECIFIED OBESITY TYPE: ICD-10-CM

## 2020-04-06 DIAGNOSIS — I10 HYPERTENSION, UNSPECIFIED TYPE: ICD-10-CM

## 2020-04-07 DIAGNOSIS — I10 BENIGN ESSENTIAL HYPERTENSION: ICD-10-CM

## 2020-04-07 RX ORDER — NIFEDIPINE 90 MG/1
90 TABLET, EXTENDED RELEASE ORAL DAILY
Qty: 90 TABLET | Refills: 0 | Status: SHIPPED | OUTPATIENT
Start: 2020-04-07 | End: 2020-07-08

## 2020-04-07 NOTE — TELEPHONE ENCOUNTER
NIFEDIPINE ER 90 MG TABLET     Last Written Prescription Date:  3/15/2019  Last Fill Quantity: 90,   # refills: 3  Last Office Visit : 3/15/2019  Future Office visit:  None    Routing refill request to provider for review/approval because:  Continue same dose??  Follow up with Pt??    Refer to Provide for review and refills for Pt care.    Rebekah John RN  Central Triage Red Flags/Med Refills

## 2020-04-10 RX ORDER — LISINOPRIL 20 MG/1
20 TABLET ORAL DAILY
Qty: 90 TABLET | Refills: 0 | Status: SHIPPED | OUTPATIENT
Start: 2020-04-10 | End: 2020-07-02

## 2020-04-10 NOTE — TELEPHONE ENCOUNTER
LISINOPRIL 20 MG TABLET       Last Written Prescription Date:  3/15/19  Last Fill Quantity: 90,   # refills: 3  Last Office Visit : 3/15/19 with 12 week follow up recommended  Future Office visit:  None scheduled    Routing refill request to provider for review/approval because:    Over due for appointment, creatinine and potassium, no blood pressure on record for > 1 year

## 2020-05-05 ENCOUNTER — TELEPHONE (OUTPATIENT)
Dept: ENDOCRINOLOGY | Facility: CLINIC | Age: 45
End: 2020-05-05

## 2020-05-08 ENCOUNTER — MYC MEDICAL ADVICE (OUTPATIENT)
Dept: ENDOCRINOLOGY | Facility: CLINIC | Age: 45
End: 2020-05-08

## 2020-05-08 ENCOUNTER — VIRTUAL VISIT (OUTPATIENT)
Dept: ENDOCRINOLOGY | Facility: CLINIC | Age: 45
End: 2020-05-08
Payer: COMMERCIAL

## 2020-05-08 VITALS — HEIGHT: 70 IN | WEIGHT: 255.2 LBS | BODY MASS INDEX: 36.54 KG/M2

## 2020-05-08 DIAGNOSIS — E66.811 CLASS 1 OBESITY WITH SERIOUS COMORBIDITY AND BODY MASS INDEX (BMI) OF 34.0 TO 34.9 IN ADULT, UNSPECIFIED OBESITY TYPE: Primary | ICD-10-CM

## 2020-05-08 DIAGNOSIS — E28.2 PCOS (POLYCYSTIC OVARIAN SYNDROME): ICD-10-CM

## 2020-05-08 DIAGNOSIS — I10 ESSENTIAL HYPERTENSION: ICD-10-CM

## 2020-05-08 ASSESSMENT — MIFFLIN-ST. JEOR: SCORE: 1892.83

## 2020-05-08 ASSESSMENT — PAIN SCALES - GENERAL: PAINLEVEL: NO PAIN (0)

## 2020-05-08 NOTE — PROGRESS NOTES
"Holli Saxena is a 44 year old female who is being evaluated via a billable video visit.      The patient has been notified of following:     \"This video visit will be conducted via a call between you and your physician/provider. We have found that certain health care needs can be provided without the need for an in-person physical exam.  This service lets us provide the care you need with a video conversation.  If a prescription is necessary we can send it directly to your pharmacy.  If lab work is needed we can place an order for that and you can then stop by our lab to have the test done at a later time.    Video visits are billed at different rates depending on your insurance coverage.  Please reach out to your insurance provider with any questions.    If during the course of the call the physician/provider feels a video visit is not appropriate, you will not be charged for this service.\"    Patient has given verbal consent for Video visit? Yes    How would you like to obtain your AVS? [facility]    Patient would like the video invitation sent by: Text to cell phone: [phone]    Will anyone else be joining your video visit? No    Return Medical Weight Management Note     Holli Saxena  MRN:  [id number]  :  1975  ALIA:  2020    Dear Colleagues,    I had the pleasure of seeing your patient Holli Saxena. She is a 44 year old female who I am continuing to see for treatment of obesity related to:    Hypertension  Hyperlipidemia   PCOS    INTERVAL HISTORY:  I last saw her 3/15/19. 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 She is wanting to restart phentermine since it helped her with treatment of her obesity in the past. She is monitoring her own blood pressure and pulse and it is 125/78 pulse 79 and she is taking both " "of her blood pressure medications. She reports gain of 20 lbs due to the above stress and associated lifestyle changes.  Fortunately, she tells me that she is part of federal [employer] program called [employer] and this is a highly structured wellness program that is free for 1 year and they provide a scale, regular coaching, weekly lessons to read, RD visits, and groups of 6-8 people (social support) who meet w/ RD, and in the educational modules they learn label reading and about exercise, etc.    CURRENT WEIGHT:   255 lbs 3.2 oz    Initial Weight (lbs): 225 lbs(07/12/10)  Last Visits Weight: 106.7 kg (235 lb 3.2 oz)(03/16/19)  Cumulative weight loss (lbs): -30.2  Weight Loss Percentage: -13.42%    No flowsheet data found.    VITALS:  Ht 1.778 m (5' 10\")   Wt 115.8 kg (255 lb 3.2 oz)   BMI 36.62 kg/m      MEDICATIONS:   Current Outpatient Medications   Medication Sig Dispense Refill     Flaxseed, Linseed, (FLAXSEED OIL PO) Take as directed       lisinopril (ZESTRIL) 20 MG tablet Take 1 tablet (20 mg) by mouth daily For additional refills, please schedule a follow-up appointment with Dr Mahoney at [phone] 90 tablet 0     Multiple Vitamin (MULTIVITAMINS PO) Take 1 tablet by mouth daily.       NIFEdipine ER OSMOTIC (PROCARDIA XL) 90 MG 24 hr tablet Take 1 tablet (90 mg) by mouth daily 90 tablet 0     phentermine (ADIPEX-P) 37.5 MG tablet Take 1/2 tab in am 16 tablet 3     topiramate (TOPAMAX) 50 MG tablet Take 1 tablet (50 mg) by mouth daily 90 tablet 3       Weight Loss Medication History Reviewed With Patient 5/8/2020   Which weight loss medications are you currently taking on a regular basis?  -   If you are not taking a weight loss medication that was prescribed to you, please indicate why: Other   Are you having any side effects from the weight loss medication that we have prescribed you? Yes   If you are having side effects please describe: Tingling feet (stopped taking phentermine because I ran out) "       ASSESSMENT:     Obesity, goals now improving, relapse remains a work in progress given COVID-19 and patient's incredible workload as an admin. Patient has good structural program support thru [email or web address] but needs additional pharmacologic support via phentermine since it worked well for her in the past. She is able to self-monitor vital signs and is doing well now so can restart the phentermine part.    Topiramate need to be on hold for now until she can establish with OB-GYN again as she does not have a reliable form of birth control available now and the medication is associated with fetal malformations. She is not planning to become pregnant. Her plan is that her  is going to get fixed in the future.    PLAN:   Patient Instructions:    Food goal: go back to 1200 calories    Activity goal: rowing machine at home 10 minutes x work way up 30 minutes at least 4 times weekly by the end of the month, gardening alone daily for self preservation    Weight goal: weigh self 2x weekly and lose 1-2 lbs weekly  [employer] ([employer] sent her a scale)    Medication goal: 1/2 tab phentermine in am    Blood pressure goal: <130/80, continue to self-monitor    Pulse goal:     Structured Program goal: as per [email or web address]    FOLLOW-UP:    12 weeks.    Time: 60 min spent on chart review, evaluation, management, counseling, education, & motivational interviewing with greater than 50% of the total time was spent on counseling and coordinating care    Sincerely,    Dr. Umu Mahoney MD, MPH  Endocrinologist    Video-Visit Details    Type of service:  Video Visit    Video Time: Start: 05/08/2020 09:21 am   Stop: 05/08/2020 10:09 am    Originating Location (pt. Location): Home    Distant Location (provider location):  [email or web address]     Platform used for Video Visit: [employer]

## 2020-05-08 NOTE — NURSING NOTE
"(   Chief Complaint   Patient presents with     RECHECK     Follow up from 03/15/19, medication refill.    )    ( Weight: 115.8 kg (255 lb 3.2 oz)(Pt reported) )  ( Height: 177.8 cm (5' 10\")(Pt reported) )  ( BMI (Calculated): 36.62 )  (   )  ( Cumulative weight loss (lbs): -30.2 )  ( Last Visits Weight: 106.7 kg (235 lb 3.2 oz)(19) )  ( Wt change since last visit (lbs): 20 )  (   )  (   )    (   )  (   )  (   )  (   )  (   )  (   )  (   )    (   Patient Active Problem List   Diagnosis     Obesity     PCOS (polycystic ovarian syndrome)     Essential hypertension     Hyperlipidemia     Vitamin D deficiency     Sciatica     Chronic hypertension in pregnancy     Chronic hypertension with superimposed preeclampsia     S/P  section    )  (   Current Outpatient Medications   Medication Sig Dispense Refill     Flaxseed, Linseed, (FLAXSEED OIL PO) Take as directed       lisinopril (ZESTRIL) 20 MG tablet Take 1 tablet (20 mg) by mouth daily For additional refills, please schedule a follow-up appointment with Dr Mahoney at [phone] 90 tablet 0     Multiple Vitamin (MULTIVITAMINS PO) Take 1 tablet by mouth daily.       NIFEdipine ER OSMOTIC (PROCARDIA XL) 90 MG 24 hr tablet Take 1 tablet (90 mg) by mouth daily 90 tablet 0     phentermine (ADIPEX-P) 37.5 MG tablet Take 1/2 tab in am 16 tablet 3     topiramate (TOPAMAX) 50 MG tablet Take 1 tablet (50 mg) by mouth daily 90 tablet 3    )  ( Diabetes Eval:    )    ( Pain Eval:  No Pain (0) )    ( Wound Eval:       )    (   History   Smoking Status     Never Smoker   Smokeless Tobacco     Never Used    )    ( Signed By:  Mackenzie Arreaga CMA; May 8, 2020; 9:02 AM )    "

## 2020-05-08 NOTE — PATIENT INSTRUCTIONS
Nice to talk with you today in virtual clinic    Food goal: go back to 1200 calories    Activity goal: rowing machine at home 10 minutes x work way up 30 minutes at least 4 times weekly by the end of the month, gardening alone daily for self preservation    Weight goal: weigh self 2x weekly and lose 1-2 lbs weekly  [employer] ([employer] sent her a scale)    Medication goal: 1/2 tab phentermine in am    Blood pressure goal: <130/80, continue to self-monitor    Pulse goal:     Structured Program goal: as per [employer] [email or web address]    RTC: quarterly    Best Wishes,  Dr. Umu Mahoney MD, MPH  Endocrinologist

## 2020-05-15 RX ORDER — PHENTERMINE HYDROCHLORIDE 37.5 MG/1
TABLET ORAL
Qty: 15 TABLET | Refills: 3 | Status: SHIPPED | OUTPATIENT
Start: 2020-05-15 | End: 2020-07-24

## 2020-05-15 NOTE — TELEPHONE ENCOUNTER
Refill sent to [facility] in [facility] in [place] as per patient request.    VSS as per patient self-report and as per last encounter.

## 2020-07-01 DIAGNOSIS — I10 HYPERTENSION, UNSPECIFIED TYPE: ICD-10-CM

## 2020-07-01 DIAGNOSIS — E66.811 CLASS 1 OBESITY WITH SERIOUS COMORBIDITY AND BODY MASS INDEX (BMI) OF 34.0 TO 34.9 IN ADULT, UNSPECIFIED OBESITY TYPE: ICD-10-CM

## 2020-07-01 DIAGNOSIS — E66.01 MORBID OBESITY (H): ICD-10-CM

## 2020-07-01 DIAGNOSIS — I10 BENIGN ESSENTIAL HYPERTENSION: ICD-10-CM

## 2020-07-02 NOTE — TELEPHONE ENCOUNTER
LISINOPRIL 20 MG TABLET       Last Written Prescription Date:  4-10-20  Last Fill Quantity: 90,   # refills: 0  Last Office Visit : 5-8-20  Future Office visit:  none    Routing refill request to provider for review/approval because:  Overdue lab: NATALIE Buck  No future appt - routing since BP med    Scheduling has been notified to contact the pt for appointment.

## 2020-07-03 ENCOUNTER — TELEPHONE (OUTPATIENT)
Dept: ENDOCRINOLOGY | Facility: CLINIC | Age: 45
End: 2020-07-03

## 2020-07-03 DIAGNOSIS — I10 BENIGN ESSENTIAL HYPERTENSION: ICD-10-CM

## 2020-07-03 RX ORDER — LISINOPRIL 20 MG/1
TABLET ORAL
Qty: 90 TABLET | Refills: 0 | Status: SHIPPED | OUTPATIENT
Start: 2020-07-03 | End: 2020-10-15

## 2020-07-07 ENCOUNTER — TELEPHONE (OUTPATIENT)
Dept: ENDOCRINOLOGY | Facility: CLINIC | Age: 45
End: 2020-07-07

## 2020-07-07 RX ORDER — NIFEDIPINE 90 MG/1
TABLET, EXTENDED RELEASE ORAL
Qty: 90 TABLET | Refills: 0 | OUTPATIENT
Start: 2020-07-07

## 2020-07-07 NOTE — TELEPHONE ENCOUNTER
Recieved refill request for NIFEdipine ER OSMOTIC  90 MG  Patient needs appointment scheduled prior to any refills. Clinic Coordinator notified and will follow up with the patient as appropriate. The pharmacy has been notified that the medication will not be refilled prior to an appointment being scheduled.

## 2020-07-08 RX ORDER — NIFEDIPINE 90 MG/1
90 TABLET, EXTENDED RELEASE ORAL DAILY
Qty: 90 TABLET | Refills: 0 | Status: SHIPPED | OUTPATIENT
Start: 2020-07-08 | End: 2020-10-15

## 2020-07-24 ENCOUNTER — VIRTUAL VISIT (OUTPATIENT)
Dept: ENDOCRINOLOGY | Facility: CLINIC | Age: 45
End: 2020-07-24
Payer: COMMERCIAL

## 2020-07-24 VITALS — HEIGHT: 70 IN | WEIGHT: 249.5 LBS | BODY MASS INDEX: 35.72 KG/M2

## 2020-07-24 DIAGNOSIS — E66.811 CLASS 1 OBESITY WITH SERIOUS COMORBIDITY AND BODY MASS INDEX (BMI) OF 34.0 TO 34.9 IN ADULT, UNSPECIFIED OBESITY TYPE: ICD-10-CM

## 2020-07-24 DIAGNOSIS — L68.0 HIRSUTISM: Primary | ICD-10-CM

## 2020-07-24 DIAGNOSIS — E28.2 PCOS (POLYCYSTIC OVARIAN SYNDROME): ICD-10-CM

## 2020-07-24 DIAGNOSIS — I10 HYPERTENSION, UNSPECIFIED TYPE: ICD-10-CM

## 2020-07-24 DIAGNOSIS — L70.9 ACNE, UNSPECIFIED ACNE TYPE: ICD-10-CM

## 2020-07-24 RX ORDER — PHENTERMINE HYDROCHLORIDE 37.5 MG/1
TABLET ORAL
Qty: 15 TABLET | Refills: 3 | Status: SHIPPED | OUTPATIENT
Start: 2020-07-24 | End: 2020-10-23

## 2020-07-24 ASSESSMENT — PAIN SCALES - GENERAL: PAINLEVEL: NO PAIN (0)

## 2020-07-24 ASSESSMENT — MIFFLIN-ST. JEOR: SCORE: 1861.97

## 2020-07-24 NOTE — PROGRESS NOTES
"Holli Saxena is a 45 year old female who is being evaluated via a billable video visit.      The patient has been notified of following:     \"This video visit will be conducted via a call between you and your physician/provider. We have found that certain health care needs can be provided without the need for an in-person physical exam.  This service lets us provide the care you need with a video conversation.  If a prescription is necessary we can send it directly to your pharmacy.  If lab work is needed we can place an order for that and you can then stop by our lab to have the test done at a later time.    Video visits are billed at different rates depending on your insurance coverage.  Please reach out to your insurance provider with any questions.    If during the course of the call the physician/provider feels a video visit is not appropriate, you will not be charged for this service.\"    Patient has given verbal consent for Video visit? Yes  How would you like to obtain your AVS? MyChart  If you are dropped from the video visit, the video invite should be resent to: Text to cell phone: [phone]  Will anyone else be joining your video visit? No      VISIT HAD TO BE CONVERTED TO PHONE VISIT DUE TO TECHNICAL ISSUES SO PHYSICIAN CALLED PATIENT.    Holli Saxena is a 45 year old female who is being evaluated via a billable telephone visit.      The patient has been notified of following:     \"This telephone visit will be conducted via a call between you and your physician/provider. We have found that certain health care needs can be provided without the need for a physical exam.  This service lets us provide the care you need with a short phone conversation.  If a prescription is necessary we can send it directly to your pharmacy.  If lab work is needed we can place an order for that and you can then stop by our lab to have the test done at a later time.    Telephone visits are billed at different rates " "depending on your insurance coverage. During this emergency period, for some insurers they may be billed the same as an in-person visit.  Please reach out to your insurance provider with any questions.    If during the course of the call the physician/provider feels a telephone visit is not appropriate, you will not be charged for this service.\"    Patient has given verbal consent for Telephone visit?  Yes    What phone number would you like to be contacted at? On file see demographics    How would you like to obtain your AVS? Ayushhart    Phone call duration: 11 minutes 54 seconds      Return Medical Weight Management Note     Holli Saxena  MRN:  [id number]  :  1975  ALIA:  2020     Dear Colleagues,    I had the pleasure of seeing your patient Holli Saxena. She is a 45 year old female who I am continuing to see for treatment of obesity related to:    Hypertension  Hyperlipidemia   PCOS    INTERVAL HISTORY:  Walking & talking at 7:30am daily x 5 days weekly x 30-60 minutes.  Taking phentermine - it is curbing cravings. No side effects except constipation. Is willing to self-monitor blood pressure, pulse, and body weight.      CURRENT WEIGHT:   249 lbs 8 oz    Initial Weight (lbs): 225 lbs  Last Visits Weight: 115.8 kg (255 lb 3.2 oz)(Pt reported)  Cumulative weight loss (lbs): -24.5  Weight Loss Percentage: -10.89%    Changes and Difficulties 2020   I have made the following changes to my diet since my last visit: Less snacking   With regards to my diet, I am still struggling with: Stress eating later in the day.   I have made the following changes to my activity/exercise since my last visit: Walking 30 minutes M-, swimming twice a week.   With regards to my activity/exercise, I am still struggling with: Making more time.       VITALS:  Ht 1.778 m (5' 10\")   Wt 113.2 kg (249 lb 8 oz)   BMI 35.80 kg/m      MEDICATIONS:   Current Outpatient Medications   Medication Sig Dispense Refill     " Flaxseed, Linseed, (FLAXSEED OIL PO) Take as directed       lisinopril (ZESTRIL) 20 MG tablet TAKE 1 TABLET (20 MG) BY MOUTH DAILY 90 tablet 0     Multiple Vitamin (MULTIVITAMINS PO) Take 1 tablet by mouth daily.       NIFEdipine ER OSMOTIC (PROCARDIA XL) 90 MG 24 hr tablet Take 1 tablet (90 mg) by mouth daily 90 tablet 0     phentermine (ADIPEX-P) 37.5 MG tablet 1/2 tab in am. 15 tablet 3       Weight Loss Medication History Reviewed With Patient 7/23/2020   Which weight loss medications are you currently taking on a regular basis?  Phentermine   If you are not taking a weight loss medication that was prescribed to you, please indicate why: -   Are you having any side effects from the weight loss medication that we have prescribed you? No   If you are having side effects please describe: -       ASSESSMENT:   Obesity, remains a work in progress. Finds phentermine helpful. Cardiac ROS negative, on SEs except for constipation which is manageable.  Hirsutism, Acne - see lab orders   HTN, historic - see lab orders & f/u w/ Dr. Abundio MUNOZ. She has enough bp medication for this quarter    PLAN:   Patient Instructions:    Nice to talk with you today in virtual clinic.    Please go to any [facility] lab. Follow standard safety precautions for COVID-19, practice social isolation, hand hygiene, do not touch your face, nose, or mouth, wear mask if have to go out in public to be respectful of community.  To find a lab location near you, please call [phone].      Food goal: go back to 1200 calories, food journal     Activity goal: Walking & talking at 7:30am daily x 5 days weekly x 30-60 minutes     Weight goal: weigh self 2x weekly and lose 1-2 lbs weekly  Filao roberto ([employer] sent her a scale)     Medication goal: 1/2 tab phentermine in am    For constipation: 2 liters of water daily + 7 prunes daily. Please let one of your MDs know if this does not help.     Blood pressure goal: <130/80, continue to self-monitor     Pulse  goal:      Structured Program goal: as per [email or web address]     FOLLOW-UP:    12 weeks.       Sincerely,     Dr. Umu Mahoney MD, MPH  Endocrinologist    Time: 25 min spent on chart review, evaluation, management, counseling, education, & motivational interviewing with greater than 50% of the total time was spent on counseling and coordinating care    Addendum:  Labs look fine except urine was not 24 hr UFC. Redo.

## 2020-07-24 NOTE — LETTER
"7/24/2020       RE: Holli Saxena  [address]  [facility] [id number]     Dear Colleague,    Thank you for referring your patient, Holli Saxena, to the [facility] MEDICAL WEIGHT MANAGEMENT at [facility]. Please see a copy of my visit note below.    Holli Saxena is a 45 year old female who is being evaluated via a billable video visit.      The patient has been notified of following:     \"This video visit will be conducted via a call between you and your physician/provider. We have found that certain health care needs can be provided without the need for an in-person physical exam.  This service lets us provide the care you need with a video conversation.  If a prescription is necessary we can send it directly to your pharmacy.  If lab work is needed we can place an order for that and you can then stop by our lab to have the test done at a later time.    Video visits are billed at different rates depending on your insurance coverage.  Please reach out to your insurance provider with any questions.    If during the course of the call the physician/provider feels a video visit is not appropriate, you will not be charged for this service.\"    Patient has given verbal consent for Video visit? Yes  How would you like to obtain your AVS? MyChart  If you are dropped from the video visit, the video invite should be resent to: Text to cell phone: [phone]  Will anyone else be joining your video visit? No      VISIT HAD TO BE CONVERTED TO PHONE VISIT DUE TO TECHNICAL ISSUES SO PHYSICIAN CALLED PATIENT.    Holli Saxena is a 45 year old female who is being evaluated via a billable telephone visit.      The patient has been notified of following:     \"This telephone visit will be conducted via a call between you and your physician/provider. We have found that certain health care needs can be provided without the need for a physical exam.  This service lets us provide the " "care you need with a short phone conversation.  If a prescription is necessary we can send it directly to your pharmacy.  If lab work is needed we can place an order for that and you can then stop by our lab to have the test done at a later time.    Telephone visits are billed at different rates depending on your insurance coverage. During this emergency period, for some insurers they may be billed the same as an in-person visit.  Please reach out to your insurance provider with any questions.    If during the course of the call the physician/provider feels a telephone visit is not appropriate, you will not be charged for this service.\"    Patient has given verbal consent for Telephone visit?  Yes    What phone number would you like to be contacted at? On file see demographics    How would you like to obtain your AVS? MyChart    Phone call duration: 11 minutes 54 seconds      Return Medical Weight Management Note     Holli Saxena  MRN:  [id number]  :  1975  ALIA:  2020     Dear Colleagues,    I had the pleasure of seeing your patient Holli Saxena. She is a 45 year old female who I am continuing to see for treatment of obesity related to:    Hypertension  Hyperlipidemia   PCOS    INTERVAL HISTORY:  Walking & talking at 7:30am daily x 5 days weekly x 30-60 minutes.  Taking phentermine - it is curbing cravings. No side effects except constipation. Is willing to self-monitor blood pressure, pulse, and body weight.      CURRENT WEIGHT:   249 lbs 8 oz    Initial Weight (lbs): 225 lbs  Last Visits Weight: 115.8 kg (255 lb 3.2 oz)(Pt reported)  Cumulative weight loss (lbs): -24.5  Weight Loss Percentage: -10.89%    Changes and Difficulties 2020   I have made the following changes to my diet since my last visit: Less snacking   With regards to my diet, I am still struggling with: Stress eating later in the day.   I have made the following changes to my activity/exercise since my last visit: Walking " "30 minutes M-F, swimming twice a week.   With regards to my activity/exercise, I am still struggling with: Making more time.       VITALS:  Ht 1.778 m (5' 10\")   Wt 113.2 kg (249 lb 8 oz)   BMI 35.80 kg/m      MEDICATIONS:   Current Outpatient Medications   Medication Sig Dispense Refill     Flaxseed, Linseed, (FLAXSEED OIL PO) Take as directed       lisinopril (ZESTRIL) 20 MG tablet TAKE 1 TABLET (20 MG) BY MOUTH DAILY 90 tablet 0     Multiple Vitamin (MULTIVITAMINS PO) Take 1 tablet by mouth daily.       NIFEdipine ER OSMOTIC (PROCARDIA XL) 90 MG 24 hr tablet Take 1 tablet (90 mg) by mouth daily 90 tablet 0     phentermine (ADIPEX-P) 37.5 MG tablet 1/2 tab in am. 15 tablet 3       Weight Loss Medication History Reviewed With Patient 7/23/2020   Which weight loss medications are you currently taking on a regular basis?  Phentermine   If you are not taking a weight loss medication that was prescribed to you, please indicate why: -   Are you having any side effects from the weight loss medication that we have prescribed you? No   If you are having side effects please describe: -       ASSESSMENT:   Obesity, remains a work in progress. Finds phentermine helpful. Cardiac ROS negative, on SEs except for constipation which is manageable.  Hirsutism, Acne - see lab orders   HTN, historic - see lab orders & f/u w/ Dr. Abundio MUNOZ. She has enough bp medication for this quarter    PLAN:   Patient Instructions:    Nice to talk with you today in virtual clinic.    Please go to any [facility] lab. Follow standard safety precautions for COVID-19, practice social isolation, hand hygiene, do not touch your face, nose, or mouth, wear mask if have to go out in public to be respectful of community.  To find a lab location near you, please call [phone].      Food goal: go back to 1200 calories, food journal     Activity goal: Walking & talking at 7:30am daily x 5 days weekly x 30-60 minutes     Weight goal: weigh self 2x weekly " and lose 1-2 lbs weekly  [facility] roberto ([employer] sent her a scale)     Medication goal: 1/2 tab phentermine in am    For constipation: 2 liters of water daily + 7 prunes daily. Please let one of your MDs know if this does not help.     Blood pressure goal: <130/80, continue to self-monitor     Pulse goal:      Structured Program goal: as per [email or web address]     FOLLOW-UP:    12 weeks.       Sincerely,     Dr. Umu Mahoney MD, MPH  Endocrinologist    Time: 25 min spent on chart review, evaluation, management, counseling, education, & motivational interviewing with greater than 50% of the total time was spent on counseling and coordinating care

## 2020-07-24 NOTE — NURSING NOTE
"(   Chief Complaint   Patient presents with     RECHECK     2 month weight management follow up.    )    ( Weight: 113.2 kg (249 lb 8 oz)(Pt reported) )  ( Height: 177.8 cm (5' 10\")(Pt reported) )  ( BMI (Calculated): 35.8 )  (   )  ( Cumulative weight loss (lbs): -24.5 )  ( Last Visits Weight: 115.8 kg (255 lb 3.2 oz)(Pt reported) )  ( Wt change since last visit (lbs): -5.7 )  (   )  (   )    (   )  (   )  (   )  (   )  (   )  (   )  (   )    (   Patient Active Problem List   Diagnosis     Obesity     PCOS (polycystic ovarian syndrome)     Essential hypertension     Hyperlipidemia     Vitamin D deficiency     Sciatica     Chronic hypertension in pregnancy     Chronic hypertension with superimposed preeclampsia     S/P  section    )  (   Current Outpatient Medications   Medication Sig Dispense Refill     Flaxseed, Linseed, (FLAXSEED OIL PO) Take as directed       lisinopril (ZESTRIL) 20 MG tablet TAKE 1 TABLET (20 MG) BY MOUTH DAILY 90 tablet 0     Multiple Vitamin (MULTIVITAMINS PO) Take 1 tablet by mouth daily.       NIFEdipine ER OSMOTIC (PROCARDIA XL) 90 MG 24 hr tablet Take 1 tablet (90 mg) by mouth daily 90 tablet 0     phentermine (ADIPEX-P) 37.5 MG tablet 1/2 tab in am. 15 tablet 3    )  ( Diabetes Eval:    )    ( Pain Eval:  No Pain (0) )    ( Wound Eval:       )    (   History   Smoking Status     Never Smoker   Smokeless Tobacco     Never Used    )    ( Signed By:  Mackenzie Arreaga CMA; 2020; 11:25 AM )    "

## 2020-10-07 DIAGNOSIS — L70.9 ACNE, UNSPECIFIED ACNE TYPE: ICD-10-CM

## 2020-10-07 DIAGNOSIS — E66.811 CLASS 1 OBESITY WITH SERIOUS COMORBIDITY AND BODY MASS INDEX (BMI) OF 34.0 TO 34.9 IN ADULT, UNSPECIFIED OBESITY TYPE: ICD-10-CM

## 2020-10-07 DIAGNOSIS — E28.2 PCOS (POLYCYSTIC OVARIAN SYNDROME): ICD-10-CM

## 2020-10-07 DIAGNOSIS — I10 HYPERTENSION, UNSPECIFIED TYPE: ICD-10-CM

## 2020-10-07 DIAGNOSIS — L68.0 HIRSUTISM: ICD-10-CM

## 2020-10-07 PROCEDURE — 84270 ASSAY OF SEX HORMONE GLOBUL: CPT | Performed by: INTERNAL MEDICINE

## 2020-10-07 PROCEDURE — 84403 ASSAY OF TOTAL TESTOSTERONE: CPT | Mod: 90 | Performed by: INTERNAL MEDICINE

## 2020-10-07 PROCEDURE — 99000 SPECIMEN HANDLING OFFICE-LAB: CPT | Performed by: INTERNAL MEDICINE

## 2020-10-07 PROCEDURE — 36415 COLL VENOUS BLD VENIPUNCTURE: CPT | Mod: 90 | Performed by: INTERNAL MEDICINE

## 2020-10-07 PROCEDURE — 83498 ASY HYDROXYPROGESTERONE 17-D: CPT | Performed by: INTERNAL MEDICINE

## 2020-10-07 PROCEDURE — 80048 BASIC METABOLIC PNL TOTAL CA: CPT | Performed by: INTERNAL MEDICINE

## 2020-10-07 PROCEDURE — 82627 DEHYDROEPIANDROSTERONE: CPT | Performed by: INTERNAL MEDICINE

## 2020-10-07 PROCEDURE — 82530 CORTISOL FREE: CPT | Mod: 90 | Performed by: INTERNAL MEDICINE

## 2020-10-08 LAB
ANION GAP SERPL CALCULATED.3IONS-SCNC: 7 MMOL/L (ref 3–14)
BUN SERPL-MCNC: 10 MG/DL (ref 7–30)
CALCIUM SERPL-MCNC: 9.3 MG/DL (ref 8.5–10.1)
CHLORIDE SERPL-SCNC: 105 MMOL/L (ref 94–109)
CO2 SERPL-SCNC: 24 MMOL/L (ref 20–32)
CREAT SERPL-MCNC: 0.73 MG/DL (ref 0.52–1.04)
GFR SERPL CREATININE-BSD FRML MDRD: >90 ML/MIN/{1.73_M2}
GLUCOSE SERPL-MCNC: 84 MG/DL (ref 70–99)
POTASSIUM SERPL-SCNC: 4.4 MMOL/L (ref 3.4–5.3)
SODIUM SERPL-SCNC: 136 MMOL/L (ref 133–144)

## 2020-10-09 LAB
DHEA-S SERPL-MCNC: 45 UG/DL (ref 35–430)
SHBG SERPL-SCNC: 59 NMOL/L (ref 30–135)
TESTOST FREE SERPL-MCNC: 0.54 NG/DL (ref 0.11–0.58)
TESTOST SERPL-MCNC: 45 NG/DL (ref 8–60)

## 2020-10-10 LAB
COLLECT DURATION TIME SPEC: NORMAL H
CORTIS F 24H UR HPLC-MCNC: 3.07 UG/L
CORTIS F 24H UR-MRATE: NORMAL UG/D
CORTIS F/CREAT 24H UR: 9.03 UG/G CRT
CREAT 24H UR-MRATE: NORMAL MG/D (ref 700–1600)
CREAT UR-MCNC: 34 MG/DL
IMP & REVIEW OF LAB RESULTS: NORMAL
SPECIMEN VOL ?TM UR: 0 ML

## 2020-10-12 DIAGNOSIS — I10 BENIGN ESSENTIAL HYPERTENSION: ICD-10-CM

## 2020-10-12 LAB — 17OHP SERPL-MCNC: 27 NG/DL

## 2020-10-13 DIAGNOSIS — I10 BENIGN ESSENTIAL HYPERTENSION: ICD-10-CM

## 2020-10-13 DIAGNOSIS — E66.01 MORBID OBESITY (H): ICD-10-CM

## 2020-10-13 DIAGNOSIS — I10 HYPERTENSION, UNSPECIFIED TYPE: ICD-10-CM

## 2020-10-13 DIAGNOSIS — E66.811 CLASS 1 OBESITY WITH SERIOUS COMORBIDITY AND BODY MASS INDEX (BMI) OF 34.0 TO 34.9 IN ADULT, UNSPECIFIED OBESITY TYPE: ICD-10-CM

## 2020-10-13 NOTE — TELEPHONE ENCOUNTER
Refill request from pharmacy for Lisinopril. Patient needs quarterly appointment. Refill denied at this time. Appiny message sent to patient to schedule virtual follow up visit with .

## 2020-10-15 ENCOUNTER — APPOINTMENT (OUTPATIENT)
Dept: LAB | Facility: CLINIC | Age: 45
End: 2020-10-15
Payer: COMMERCIAL

## 2020-10-15 RX ORDER — LISINOPRIL 20 MG/1
20 TABLET ORAL DAILY
Qty: 90 TABLET | Refills: 0 | Status: SHIPPED | OUTPATIENT
Start: 2020-10-15 | End: 2020-10-23

## 2020-10-15 RX ORDER — NIFEDIPINE 90 MG/1
90 TABLET, EXTENDED RELEASE ORAL DAILY
Qty: 30 TABLET | Refills: 0 | Status: SHIPPED | OUTPATIENT
Start: 2020-10-15 | End: 2020-10-23

## 2020-10-15 NOTE — TELEPHONE ENCOUNTER
LCV:7/24/2020  [facility] Medical Weight Management  NCV: 10-23-20  past due BP checks  Last clinic note:               Blood pressure goal: <130/80, continue to self-monitor  30 Day RF

## 2020-10-19 DIAGNOSIS — E66.811 CLASS 1 OBESITY WITH SERIOUS COMORBIDITY AND BODY MASS INDEX (BMI) OF 34.0 TO 34.9 IN ADULT, UNSPECIFIED OBESITY TYPE: ICD-10-CM

## 2020-10-19 DIAGNOSIS — L68.0 HIRSUTISM: ICD-10-CM

## 2020-10-19 DIAGNOSIS — I10 HYPERTENSION, UNSPECIFIED TYPE: ICD-10-CM

## 2020-10-19 DIAGNOSIS — L70.9 ACNE, UNSPECIFIED ACNE TYPE: ICD-10-CM

## 2020-10-19 DIAGNOSIS — E28.2 PCOS (POLYCYSTIC OVARIAN SYNDROME): ICD-10-CM

## 2020-10-19 PROCEDURE — 82530 CORTISOL FREE: CPT | Mod: 90 | Performed by: INTERNAL MEDICINE

## 2020-10-19 PROCEDURE — 99000 SPECIMEN HANDLING OFFICE-LAB: CPT | Performed by: INTERNAL MEDICINE

## 2020-10-22 LAB
COLLECT DURATION TIME SPEC: ABNORMAL H
CORTIS F 24H UR HPLC-MCNC: 5.98 UG/L
CORTIS F 24H UR-MRATE: 18.5 UG/D
CORTIS F/CREAT 24H UR: 9.97 UG/G CRT
CREAT 24H UR-MRATE: 1854 MG/D (ref 700–1600)
CREAT UR-MCNC: 60 MG/DL
IMP & REVIEW OF LAB RESULTS: ABNORMAL
SPECIMEN VOL ?TM UR: ABNORMAL ML

## 2020-10-23 ENCOUNTER — VIRTUAL VISIT (OUTPATIENT)
Dept: ENDOCRINOLOGY | Facility: CLINIC | Age: 45
End: 2020-10-23
Payer: COMMERCIAL

## 2020-10-23 VITALS — BODY MASS INDEX: 35.1 KG/M2 | HEIGHT: 70 IN | WEIGHT: 245.2 LBS

## 2020-10-23 DIAGNOSIS — E28.2 PCOS (POLYCYSTIC OVARIAN SYNDROME): ICD-10-CM

## 2020-10-23 DIAGNOSIS — E66.01 MORBID OBESITY (H): ICD-10-CM

## 2020-10-23 DIAGNOSIS — E66.811 CLASS 1 OBESITY WITH SERIOUS COMORBIDITY AND BODY MASS INDEX (BMI) OF 34.0 TO 34.9 IN ADULT, UNSPECIFIED OBESITY TYPE: ICD-10-CM

## 2020-10-23 DIAGNOSIS — I10 BENIGN ESSENTIAL HYPERTENSION: ICD-10-CM

## 2020-10-23 DIAGNOSIS — I10 HYPERTENSION, UNSPECIFIED TYPE: ICD-10-CM

## 2020-10-23 PROCEDURE — 99213 OFFICE O/P EST LOW 20 MIN: CPT | Mod: 95 | Performed by: INTERNAL MEDICINE

## 2020-10-23 RX ORDER — LISINOPRIL 20 MG/1
20 TABLET ORAL DAILY
Qty: 90 TABLET | Refills: 3 | Status: SHIPPED | OUTPATIENT
Start: 2020-10-23 | End: 2021-12-14

## 2020-10-23 RX ORDER — NIFEDIPINE 90 MG/1
90 TABLET, EXTENDED RELEASE ORAL DAILY
Qty: 90 TABLET | Refills: 3 | Status: SHIPPED | OUTPATIENT
Start: 2020-10-23 | End: 2021-12-10

## 2020-10-23 RX ORDER — PHENTERMINE HYDROCHLORIDE 37.5 MG/1
TABLET ORAL
Qty: 15 TABLET | Refills: 3 | Status: SHIPPED | OUTPATIENT
Start: 2020-10-23 | End: 2022-11-15

## 2020-10-23 ASSESSMENT — MIFFLIN-ST. JEOR: SCORE: 1842.47

## 2020-10-23 ASSESSMENT — PAIN SCALES - GENERAL: PAINLEVEL: NO PAIN (0)

## 2020-10-23 NOTE — PROGRESS NOTES
"Holli Saxena is a 45 year old female who is being evaluated via a billable video visit.      The patient has been notified of following:     \"This video visit will be conducted via a call between you and your physician/provider. We have found that certain health care needs can be provided without the need for an in-person physical exam.  This service lets us provide the care you need with a video conversation.  If a prescription is necessary we can send it directly to your pharmacy.  If lab work is needed we can place an order for that and you can then stop by our lab to have the test done at a later time.    Video visits are billed at different rates depending on your insurance coverage.  Please reach out to your insurance provider with any questions.    If during the course of the call the physician/provider feels a video visit is not appropriate, you will not be charged for this service.\"    Patient has given verbal consent for Video visit? Yes  How would you like to obtain your AVS? MyChart  If you are dropped from the video visit, the video invite should be resent to: Text to cell phone: [phone]  Will anyone else be joining your video visit? No    VISIT HAD TO BE CONVERTED TO PHONE VISIT DUE TO TECHNICAL ISSUES SO PHYSICIAN CALLED PATIENT.    Holli Saxena is a 45 year old female who is being evaluated via a billable telephone visit.      The patient has been notified of following:     \"This telephone visit will be conducted via a call between you and your physician/provider. We have found that certain health care needs can be provided without the need for a physical exam.  This service lets us provide the care you need with a short phone conversation.  If a prescription is necessary we can send it directly to your pharmacy.  If lab work is needed we can place an order for that and you can then stop by our lab to have the test done at a later time.    Telephone visits are billed at different rates " "depending on your insurance coverage. During this emergency period, for some insurers they may be billed the same as an in-person visit.  Please reach out to your insurance provider with any questions.    If during the course of the call the physician/provider feels a telephone visit is not appropriate, you will not be charged for this service.\"    Patient has given verbal consent for Telephone visit?  Yes    What phone number would you like to be contacted at? On file see demographics    How would you like to obtain your AVS? Ayushhart    Phone call duration: 11 minutes 54 seconds      Return Medical Weight Management Note     Holli Saxena  MRN:  [id number]  :  1975  ALIA:  2020     Dear Colleagues,    I had the pleasure of seeing your patient Holli Saxena. She is a 45 year old female who I am continuing to see for treatment of obesity related to:    Hypertension  Hyperlipidemia   PCOS    INTERVAL HISTORY:    Holli is still walking and talking at 7:30 AM every morning 5 days a week for 30 minutes.  She is also still taking her phentermine daily with no side effects.  She reported previous constipation that is responding well to water and prunes.  She is taking her lisinopril nifedipine daily and monitoring her blood pressure.  Her reported blood pressure numbers are in target range.  She still weighs herself 3-4 times a week and reports her weight has been stable at 245.2 pounds.  She has had trouble following the 1200-calorie diet, and food journal discussed since the last visit in account that her child is now at home being homeschooled and she has to take on additional responsibilities.   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  She says that her family is a good support for her and her weight loss goals.  She practices intermittent fasting and does not eat until 2 PM with fruits and nuts in the " "afternoon with dinner being the main meal.  At this time she eats dinner with her family and reports overeating at dinner time.  She estimates her caloric intake to be 2000 to 2300 rosa/day.      CURRENT WEIGHT:   245 lbs 3.2 oz    Initial Weight (lbs): 225 lbs  Last Visits Weight: 113.2 kg (249 lb 8 oz)  Cumulative weight loss (lbs): -20.2  Weight Loss Percentage: -8.98%     Patient's reported home blood pressure readings:   7am 108/82, 3pm 128/90  7AM 109/75, 3pm 127/88    Changes and Difficulties 10/22/2020   I have made the following changes to my diet since my last visit: None   With regards to my diet, I am still struggling with: Stress eating   I have made the following changes to my activity/exercise since my last visit: None   With regards to my activity/exercise, I am still struggling with: Having the energy to do additional activity since school year started and increased workload at work. Motivation.       VITALS:  Ht 1.778 m (5' 10\")   Wt 111.2 kg (245 lb 3.2 oz)   BMI 35.18 kg/m      MEDICATIONS:   Current Outpatient Medications   Medication Sig Dispense Refill     Flaxseed, Linseed, (FLAXSEED OIL PO) Take as directed       lisinopril (ZESTRIL) 20 MG tablet Take 1 tablet (20 mg) by mouth daily 90 tablet 0     Multiple Vitamin (MULTIVITAMINS PO) Take 1 tablet by mouth daily.       NIFEdipine ER OSMOTIC (PROCARDIA XL) 90 MG 24 hr tablet Take 1 tablet (90 mg) by mouth daily Please keep 10-23-20 clinic appt for refills. 30 tablet 0     phentermine (ADIPEX-P) 37.5 MG tablet 1/2 tab in am. 15 tablet 3       Weight Loss Medication History Reviewed With Patient 10/22/2020   Which weight loss medications are you currently taking on a regular basis?  Phentermine   If you are not taking a weight loss medication that was prescribed to you, please indicate why: -   Are you having any side effects from the weight loss medication that we have prescribed you? No   If you are having side effects please describe: - "     LABS:   Previous labs reviewed:  10/7/20 17 oh progesterone, DHEA sulfate, testosterone, BMP, random cortisol, 10/19/20 24 hour cortisol urine, all WNL.     ASSESSMENT/PLAN:     Holli is a 45-year old  with a history of obesity, hypertension, PCOS.  Her obesity is an ongoing challenge but she remains motivated to make positive changes.    1. Class 1 Obesity    Holli was on target with her last goals until her son began school. Since then she has had trouble meeting her 1200-calorie diet, and food journaling.  She continues to take the phentermine with no cardiac side effects.  Her constipation is well managed with increased water intake and prune juice.  She is practicing intermittent fasting and is experiencing excessive hunger at dinnertime.  We discussed trying a meal replacement at lunchtime in efforts to control her dinnertime hunger. We discussed her motivation for weight loss and Holli created 2 new goals to get her back on track.    Recommendations:   -Continue phentermine 37.5 mg 1/2 tablet in AM, refill provided  -Food goal: go back to 1200 calories, begin food journaling everyday  -Try meal replacement at lunch time  -Activity Goal: Continue walking 30-60 mins in AM, new goal: Row 30 mins 3x/wk at lunch time  -Continue weighing yourself 2x per week and lose 1-2 points weekly  -Continue [employer] roberto    2. Hypertension  Holli's hypertension is well controlled with lisinopril and nifedipine.  Her reported home blood pressure readings are within goal range of  <130/80. Pulse goal   We discussed limiting salt intake and the benefits of exercise.    Recommendations:   -Continue Lisinopril 20 mg  -continue nifedipine 90 mg  -continue home monitoring of blood pressure    3. Hirsutism/acne/PCOS  Holli continues to have facial hair growth.  Her last menstrual period was September 14.  She says this comes in an unpredictable pattern every 28 to 45 days. She will see Dermatology for  acne, hirsutism. Her work-up for 21 alpha hydroxylase deficiency, excess cortisol, and androgenic causes was negative.    Recommendations:  - follow-up with dermatology for acne and facial hair management. She has tried Vaniqua in past and did not tolerate.       FOLLOW-UP:    12 weeks.    Sudhakar Malone, MS3    I, Sudhakar Malone, MS3 am acting as scribe for Dr. Umu Mahoney MD.       Sincerely,     Dr. Umu Mahoney MD, MPH  Endocrinologist    Time: 25 min spent on chart review, evaluation, management, counseling, education, & motivational interviewing with greater than 50% of the total time was spent on counseling and coordinating care  Attending addendum: Pt was seen & evaluated with medical student & plan is as outlined in this note. Student served as scribe.

## 2020-10-23 NOTE — PATIENT INSTRUCTIONS
Hi Holli,    It looks like you were going to run out of your BP meds, and when you are taking phentermine, sometimes it can exacerbate HTN as we have talked about in the past, so I went ahead and took care of them for you. I am sure Dr. Del Castillo wouldn't mind if we helped and you out a bit. Chart review shows you had your annual labs (BMP) for HTN, so you should be fine. I put in a 90 day supply on both with 3 refills.    Good luck! Please self-monitor your BP. Goals are <130/80. Thank you. Do try to get a BP, Pulse, and weight documented in Epic (electronic health record) at least 2x annually.    RTC: quarterly    Best Wishes,  Dr. Umu Mahoney MD, MPH  Endocrinologist

## 2020-10-23 NOTE — LETTER
"10/23/2020       RE: Holli Saxena  [address]  [facility] [address]     Dear Colleague,    Thank you for referring your patient, Holli Saxena, to the [facility] [place] at [facility]. Please see a copy of my visit note below.    Holli Saxena is a 45 year old female who is being evaluated via a billable video visit.      The patient has been notified of following:     \"This video visit will be conducted via a call between you and your physician/provider. We have found that certain health care needs can be provided without the need for an in-person physical exam.  This service lets us provide the care you need with a video conversation.  If a prescription is necessary we can send it directly to your pharmacy.  If lab work is needed we can place an order for that and you can then stop by our lab to have the test done at a later time.    Video visits are billed at different rates depending on your insurance coverage.  Please reach out to your insurance provider with any questions.    If during the course of the call the physician/provider feels a video visit is not appropriate, you will not be charged for this service.\"    Patient has given verbal consent for Video visit? Yes  How would you like to obtain your AVS? MyChart  If you are dropped from the video visit, the video invite should be resent to: Text to cell phone: [phone]  Will anyone else be joining your video visit? No            Holli Saxena is a 45 year old female who is being evaluated via a billable video visit.      The patient has been notified of following:     \"This video visit will be conducted via a call between you and your physician/provider. We have found that certain health care needs can be provided without the need for an in-person physical exam.  This service lets us provide the care you need with a video conversation.  If a prescription is " "necessary we can send it directly to your pharmacy.  If lab work is needed we can place an order for that and you can then stop by our lab to have the test done at a later time.    Video visits are billed at different rates depending on your insurance coverage.  Please reach out to your insurance provider with any questions.    If during the course of the call the physician/provider feels a video visit is not appropriate, you will not be charged for this service.\"    Patient has given verbal consent for Video visit? Yes  How would you like to obtain your AVS? MyChart  If you are dropped from the video visit, the video invite should be resent to: Text to cell phone: [phone]  Will anyone else be joining your video visit? No    VISIT HAD TO BE CONVERTED TO PHONE VISIT DUE TO TECHNICAL ISSUES SO PHYSICIAN CALLED PATIENTJarocho Saxena is a 45 year old female who is being evaluated via a billable telephone visit.      The patient has been notified of following:     \"This telephone visit will be conducted via a call between you and your physician/provider. We have found that certain health care needs can be provided without the need for a physical exam.  This service lets us provide the care you need with a short phone conversation.  If a prescription is necessary we can send it directly to your pharmacy.  If lab work is needed we can place an order for that and you can then stop by our lab to have the test done at a later time.    Telephone visits are billed at different rates depending on your insurance coverage. During this emergency period, for some insurers they may be billed the same as an in-person visit.  Please reach out to your insurance provider with any questions.    If during the course of the call the physician/provider feels a telephone visit is not appropriate, you will not be charged for this service.\"    Patient has given verbal consent for Telephone visit?  Yes    What phone number would you " like to be contacted at? On file see demographics    How would you like to obtain your AVS? Personalisbrennan    Phone call duration: 11 minutes 54 seconds      Return Medical Weight Management Note     Holli Saxena  MRN:  [id number]  :  1975  ALIA:  2020     Dear Colleagues,    I had the pleasure of seeing your patient Holli Saxena. She is a 45 year old female who I am continuing to see for treatment of obesity related to:    Hypertension  Hyperlipidemia   PCOS    INTERVAL HISTORY:    Holli is still walking and talking at 7:30 AM every morning 5 days a week for 30 minutes.  She is also still taking her phentermine daily with no side effects.  She reported previous constipation that is responding well to water and prunes.  She is taking her lisinopril nifedipine daily and monitoring her blood pressure.  Her reported blood pressure numbers are in target range.  She still weighs herself 3-4 times a week and reports her weight has been stable at 245.2 pounds.  She has had trouble following the 1200-calorie diet, and food journal discussed since the last visit in account that her child is now at home being homeschooled and she has to take on additional responsibilities.   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  She says that her family is a good support for her and her weight loss goals.  She practices intermittent fasting and does not eat until 2 PM with fruits and nuts in the afternoon with dinner being the main meal.  At this time she eats dinner with her family and reports overeating at dinner time.  She estimates her caloric intake to be 2000 to 2300 rosa/day.      CURRENT WEIGHT:   245 lbs 3.2 oz    Initial Weight (lbs): 225 lbs  Last Visits Weight: 113.2 kg (249 lb 8 oz)  Cumulative weight loss (lbs): -20.2  Weight Loss Percentage: -8.98%     Patient's reported home blood pressure readings:   7am 108/82, 3pm  "128/90  7AM 109/75, 3pm 127/88    Changes and Difficulties 10/22/2020   I have made the following changes to my diet since my last visit: None   With regards to my diet, I am still struggling with: Stress eating   I have made the following changes to my activity/exercise since my last visit: None   With regards to my activity/exercise, I am still struggling with: Having the energy to do additional activity since school year started and increased workload at work. Motivation.       VITALS:  Ht 1.778 m (5' 10\")   Wt 111.2 kg (245 lb 3.2 oz)   BMI 35.18 kg/m      MEDICATIONS:   Current Outpatient Medications   Medication Sig Dispense Refill     Flaxseed, Linseed, (FLAXSEED OIL PO) Take as directed       lisinopril (ZESTRIL) 20 MG tablet Take 1 tablet (20 mg) by mouth daily 90 tablet 0     Multiple Vitamin (MULTIVITAMINS PO) Take 1 tablet by mouth daily.       NIFEdipine ER OSMOTIC (PROCARDIA XL) 90 MG 24 hr tablet Take 1 tablet (90 mg) by mouth daily Please keep 10-23-20 clinic appt for refills. 30 tablet 0     phentermine (ADIPEX-P) 37.5 MG tablet 1/2 tab in am. 15 tablet 3       Weight Loss Medication History Reviewed With Patient 10/22/2020   Which weight loss medications are you currently taking on a regular basis?  Phentermine   If you are not taking a weight loss medication that was prescribed to you, please indicate why: -   Are you having any side effects from the weight loss medication that we have prescribed you? No   If you are having side effects please describe: -     LABS:   Previous labs reviewed:  10/7/20 17 oh progesterone, DHEA sulfate, testosterone, BMP, random cortisol, 10/19/20 24 hour cortisol urine, all WNL.     ASSESSMENT/PLAN:     Holli is a 45-year old  with a history of obesity, hypertension, PCOS.  Her obesity is an ongoing challenge but she remains motivated to make positive changes.    1. Class 1 Obesity    Holli was on target with her last goals until her son " began school. Since then she has had trouble meeting her 1200-calorie diet, and food journaling.  She continues to take the phentermine with no cardiac side effects.  Her constipation is well managed with increased water intake and prune juice.  She is practicing intermittent fasting and is experiencing excessive hunger at dinnertime.  We discussed trying a meal replacement at lunchtime in efforts to control her dinnertime hunger. We discussed her motivation for weight loss and Holli created 2 new goals to get her back on track.    Recommendations:   -Continue phentermine 37.5 mg 1/2 tablet in AM, refill provided  -Food goal: go back to 1200 calories, begin food journaling everyday  -Try meal replacement at lunch time  -Activity Goal: Continue walking 30-60 mins in AM, new goal: Row 30 mins 3x/wk at lunch time  -Continue weighing yourself 2x per week and lose 1-2 points weekly  -Continue [employer] roberto    2. Hypertension  Holli's hypertension is well controlled with lisinopril and nifedipine.  Her reported home blood pressure readings are within goal range of  <130/80. Pulse goal   We discussed limiting salt intake and the benefits of exercise.    Recommendations:   -Continue Lisinopril 20 mg  -continue nifedipine 90 mg  -continue home monitoring of blood pressure    3. Hirsutism/acne/PCOS  Holli continues to have facial hair growth.  Her last menstrual period was September 14.  She says this comes in an unpredictable pattern every 28 to 45 days. She will see Dermatology for acne, hirsutism. Her work-up for 21 alpha hydroxylase deficiency, excess cortisol, and androgenic causes was negative.    Recommendations:  - follow-up with dermatology for acne and facial hair management. She has tried Vaniqua in past and did not tolerate.       FOLLOW-UP:    12 weeks.    Sudhakar Malone, MS3    I, Sudhakar Malone, MS3 am acting as scribe for Dr. Umu Mahoney MD.       Sincerely,     Dr. Umu Mahoney MD,  MPH  Endocrinologist    Time: 25 min spent on chart review, evaluation, management, counseling, education, & motivational interviewing with greater than 50% of the total time was spent on counseling and coordinating care  Attending addendum: Pt was seen & evaluated with medical student & plan is as outlined in this note. Student served as scribe.

## 2020-10-23 NOTE — PROGRESS NOTES
"Holli Saxena is a 45 year old female who is being evaluated via a billable video visit.      The patient has been notified of following:     \"This video visit will be conducted via a call between you and your physician/provider. We have found that certain health care needs can be provided without the need for an in-person physical exam.  This service lets us provide the care you need with a video conversation.  If a prescription is necessary we can send it directly to your pharmacy.  If lab work is needed we can place an order for that and you can then stop by our lab to have the test done at a later time.    Video visits are billed at different rates depending on your insurance coverage.  Please reach out to your insurance provider with any questions.    If during the course of the call the physician/provider feels a video visit is not appropriate, you will not be charged for this service.\"    Patient has given verbal consent for Video visit? Yes  How would you like to obtain your AVS? MyChart  If you are dropped from the video visit, the video invite should be resent to: Text to cell phone: [phone]  Will anyone else be joining your video visit? No          "

## 2020-10-23 NOTE — NURSING NOTE
"(   Chief Complaint   Patient presents with     Follow Up     3 month weight management follow up.    )    ( Weight: 111.2 kg (245 lb 3.2 oz)(Pt reported) )  ( Height: 177.8 cm (5' 10\") )  ( BMI (Calculated): 35.18 )  (   )  ( Cumulative weight loss (lbs): -20.2 )  ( Last Visits Weight: 113.2 kg (249 lb 8 oz) )  ( Wt change since last visit (lbs): -4.3 )  (   )  (   )    (   )  (   )  (   )  (   )  (   )  (   )  (   )    (   Patient Active Problem List   Diagnosis     Obesity     PCOS (polycystic ovarian syndrome)     Essential hypertension     Hyperlipidemia     Vitamin D deficiency     Sciatica     Chronic hypertension in pregnancy     Chronic hypertension with superimposed preeclampsia     S/P  section    )  (   Current Outpatient Medications   Medication Sig Dispense Refill     Flaxseed, Linseed, (FLAXSEED OIL PO) Take as directed       lisinopril (ZESTRIL) 20 MG tablet Take 1 tablet (20 mg) by mouth daily 90 tablet 0     Multiple Vitamin (MULTIVITAMINS PO) Take 1 tablet by mouth daily.       NIFEdipine ER OSMOTIC (PROCARDIA XL) 90 MG 24 hr tablet Take 1 tablet (90 mg) by mouth daily Please keep 10-23-20 clinic appt for refills. 30 tablet 0     phentermine (ADIPEX-P) 37.5 MG tablet 1/2 tab in am. 15 tablet 3    )  ( Diabetes Eval:    )    ( Pain Eval:  No Pain (0) )    ( Wound Eval:       )    (   History   Smoking Status     Never Smoker   Smokeless Tobacco     Never Used    )    ( Signed By:  Mackenzie Arreaga CMA; 2020; 7:31 AM )    "

## 2021-04-17 ENCOUNTER — HEALTH MAINTENANCE LETTER (OUTPATIENT)
Age: 46
End: 2021-04-17

## 2021-06-12 ENCOUNTER — HEALTH MAINTENANCE LETTER (OUTPATIENT)
Age: 46
End: 2021-06-12

## 2021-10-02 ENCOUNTER — HEALTH MAINTENANCE LETTER (OUTPATIENT)
Age: 46
End: 2021-10-02

## 2021-11-18 DIAGNOSIS — E28.2 PCOS (POLYCYSTIC OVARIAN SYNDROME): ICD-10-CM

## 2021-11-18 DIAGNOSIS — I10 HYPERTENSION, UNSPECIFIED TYPE: ICD-10-CM

## 2021-11-18 DIAGNOSIS — I10 BENIGN ESSENTIAL HYPERTENSION: ICD-10-CM

## 2021-11-18 DIAGNOSIS — E66.01 MORBID OBESITY (H): ICD-10-CM

## 2021-11-18 DIAGNOSIS — E66.811 CLASS 1 OBESITY WITH SERIOUS COMORBIDITY AND BODY MASS INDEX (BMI) OF 34.0 TO 34.9 IN ADULT, UNSPECIFIED OBESITY TYPE: ICD-10-CM

## 2021-11-18 RX ORDER — NIFEDIPINE 90 MG/1
TABLET, FILM COATED, EXTENDED RELEASE ORAL
Qty: 90 TABLET | Refills: 3 | OUTPATIENT
Start: 2021-11-18

## 2021-11-18 NOTE — TELEPHONE ENCOUNTER
Last Clinic Visit: 10/23/20 recommended 12 week follow up, no upcoming appointments scheduled  Recieved refill request for NIFEDIPINE ER 90 MG TABLET . Patient needs appointment scheduled prior to any refills. Clinic Coordinator notified and will follow up with the patient as appropriate. The pharmacy has been notified that the medication will not be refilled prior to an appointment being scheduled.

## 2021-12-08 DIAGNOSIS — I10 BENIGN ESSENTIAL HYPERTENSION: ICD-10-CM

## 2021-12-08 DIAGNOSIS — I10 HYPERTENSION, UNSPECIFIED TYPE: ICD-10-CM

## 2021-12-08 DIAGNOSIS — E66.811 CLASS 1 OBESITY WITH SERIOUS COMORBIDITY AND BODY MASS INDEX (BMI) OF 34.0 TO 34.9 IN ADULT, UNSPECIFIED OBESITY TYPE: ICD-10-CM

## 2021-12-08 DIAGNOSIS — E66.01 MORBID OBESITY (H): ICD-10-CM

## 2021-12-08 DIAGNOSIS — E28.2 PCOS (POLYCYSTIC OVARIAN SYNDROME): ICD-10-CM

## 2021-12-10 ENCOUNTER — VIRTUAL VISIT (OUTPATIENT)
Dept: ENDOCRINOLOGY | Facility: CLINIC | Age: 46
End: 2021-12-10
Payer: COMMERCIAL

## 2021-12-10 VITALS — HEIGHT: 70 IN | BODY MASS INDEX: 37.08 KG/M2 | WEIGHT: 259 LBS

## 2021-12-10 DIAGNOSIS — I10 HYPERTENSION, UNSPECIFIED TYPE: ICD-10-CM

## 2021-12-10 DIAGNOSIS — E66.01 MORBID OBESITY (H): ICD-10-CM

## 2021-12-10 DIAGNOSIS — E28.2 PCOS (POLYCYSTIC OVARIAN SYNDROME): ICD-10-CM

## 2021-12-10 DIAGNOSIS — I10 BENIGN ESSENTIAL HYPERTENSION: ICD-10-CM

## 2021-12-10 DIAGNOSIS — E66.811 CLASS 1 OBESITY WITH SERIOUS COMORBIDITY AND BODY MASS INDEX (BMI) OF 34.0 TO 34.9 IN ADULT, UNSPECIFIED OBESITY TYPE: ICD-10-CM

## 2021-12-10 DIAGNOSIS — E66.01 MORBID OBESITY (H): Primary | ICD-10-CM

## 2021-12-10 PROCEDURE — 99214 OFFICE O/P EST MOD 30 MIN: CPT | Mod: 95 | Performed by: INTERNAL MEDICINE

## 2021-12-10 RX ORDER — NIFEDIPINE 90 MG/1
90 TABLET, FILM COATED, EXTENDED RELEASE ORAL DAILY
Qty: 90 TABLET | Refills: 0 | Status: SHIPPED | OUTPATIENT
Start: 2021-12-10 | End: 2022-03-08 | Stop reason: DRUGHIGH

## 2021-12-10 ASSESSMENT — MIFFLIN-ST. JEOR: SCORE: 1900.07

## 2021-12-10 ASSESSMENT — PAIN SCALES - GENERAL: PAINLEVEL: EXTREME PAIN (8)

## 2021-12-10 NOTE — PATIENT INSTRUCTIONS
"Nice to talk with you today in virtual clinic!    Please call Dr. Del Castillo [phone] and get into see her.    Start Ozempic 0.25 mg subcutaneous qwk x 4 wks, then if tolerated, plan to incr to 0.5 mg qwk x 4 wk, then incr to 1.0 mg qwk and stay at 1.0 mg weekly.    Please go to any [facility] lab. Follow standard safety precautions for COVID-19, practice social isolation, hand hygiene, do not touch your face, nose, or mouth, wear mask if have to go out in public to be respectful of community.  Please contact us to schedule at any of our [facility] lab locations. Call [phone] ([phone]), select option 1.    Please start monitoring your blood pressure and if it is elevated, follow-up with your primary care provider and  Blood pressure goal: self-monitor, meditate, <2g sodium dietary restriction, stop NSAIDS (non-steroidal anti-inflammatory medications such as naproxen or Aleve and ibuprofen or Advil and use alternatives) goal <130/80  A helpful device that has been standardized is called OMRON and is usually available on [employer].com    Food goal: go back to 1400 calories, meal replacements, food journal     Activity goal: using home stationary bike 30 min daily    Ht 1.778 m (5' 10\")   Wt 117.5 kg (259 lb)   BMI 37.16 kg/m         Weight goal: weigh self 2x weekly and lose 1-2 lbs weekly  [employer] roberto ([employer] sent her a scale)     Medication goal: start Ozempic weekly    Blood pressure goal: <130/80, continue to self-monitor     Meal Replacement Products:     Here is the link to our new e-store where you can purchase our meal replacement products     [facility] E-Store  [email or web address]/store     The one week starter kit is a great way to sample a variety of products and see what works for you.     If you want more information about the product go to: [employer]     Free Shipping for orders over $75     Benefits of meal replacements products:     Portion and " calorie control  Improved nutrition  Structured eating  Simplified food choices  Avoid contact with trigger foods    Structured Program goal: as per Omada [email or web address]    Interested in working with a health ?  Health coaches work with you to improve your overall health and wellbeing.  They look at the whole person, and may involve discussion of different areas of life, including, but not limited to the four pillars of health (sleep, exercise, nutrition, and stress management). Discuss with your care team if you would like to start working a health .     Health Coaching-3 Pack:      $99 for three health coaching visits (self pay; insurance does not cover health coaching)    Visits are done via phone at this time    Coaching is a partnership between the  and the client; Coaches do not prescribe or diagnose    Coaching helps inspire the client to reach his/her personal goals   - To schedule your first health  visit, call  [phone]  - To find out more about health coaching, contact Health  Citlali at [email or web address]    Psychological Providers    Check with your insurance to see if the psychologist is covered, if not, ask your insurance company for a referral.    [facility]   Mayra Garner, PhD, LP   [phone]  Izabel Holland, PhD, LP  [phone]  Kasia Del Toro, PhD                 [phone]    Juan Francisco Zuniga, Krissy,LP             [phone]    [place]  Mariely Velasco, PhD, LP  [phone]      [facility]:           [phone]  We send a referral and patient is notified.    [facility]:  [phone]  Lissy Mott PsyD, LP    [place]  Paula Ventura PSyD, LP         [phone]    [facility]   To schedule, please call member services on the back of your card.    SHELDON Lockwood PsyD,LP,ABPP [phone]    [place]  Esa Renee MA,  [phone]    [place]  Meghann Rich, PsyD, [phone]    [place] Ch[place], PsyD, [phone]  (fibromyalgia issues)    FREDERIC Joseph  [phone]    [place]  Missymichael Deleon,PsyD,LP  [phone]  Derick Morse, PhD, LP  [phone]  Derick Neal, PhD, LP             [phone]    [place]  Edgard Dasilva , [facility], LMFT [phone]    [place]  Anamaria La, PsyD, [phone]    FREDERIC Crawford, PsyD, LP   [phone]     [place]      Outreach [phone]   Lizeth Mendes MA, LP  - ext. [phone]  Derick Enrique, PhD, LP - ext [phone]  Ori Montano PsyD, LP - ext [phone]    [place]  Beny Dupont, PhD, [phone]  Dale Gimenez PsyD, [phone]          [place]  Janel Peterson, PhD, [phone]    [place]do Stovall, PsyD, [place]  [phone]          Call updates to MEHNAZ Cates    [phone]  Last updated: 2019       RTC: 2 months      Best Wishes,  Dr. Umu Mahoney MD, MPH  Endocrinologist

## 2021-12-10 NOTE — PROGRESS NOTES
"Holli is a 46 year old who is being evaluated via a billable video visit.      How would you like to obtain your AVS? MyChart  If the video visit is dropped, the invitation should be resent by: Text to cell phone: [phone]  Will anyone else be joining your video visit? No    Return Medical Weight Management Note     Holli Saxena  MRN:  [id number]  :  1975  ALIA:  12/10/2021    Dear Colleagues,    I had the pleasure of seeing your patient Holli Saxena. She is a 46 year old female who I am continuing to see for treatment of obesity related to:    Hypertension  Hyperlipidemia   PCOS    INTERVAL HISTORY:  Last visit w/ me was 10/23/2020 and has not sought out care / COVID-19 pandemic. Wants help with weight regain. Is willing to start injection weekly of GLP-1 agonist. Denies ho pancreatitis or thyroid cancer. There are no recent vital signs or visits in [facility]. Patient says she is self-monitoring blood pressure and it has been ok. She is willing to do video or phone visits preferred over in-person 2/2 pandemic.    CURRENT WEIGHT:   259 lbs 0 oz    Initial Weight (lbs): 225 lbs  Last Visits Weight: 111.2 kg (245 lb 3.2 oz)  Cumulative weight loss (lbs): -34  Weight Loss Percentage: -15.11%    Changes and Difficulties 2021   I have made the following changes to my diet since my last visit: Nothing   With regards to my diet, I am still struggling with: Eating too much.   I have made the following changes to my activity/exercise since my last visit: Having back issues which is making exercise uncomfortable.   With regards to my activity/exercise, I am still struggling with: Sciatica       VITALS:  Ht 1.778 m (5' 10\")   Wt 117.5 kg (259 lb)   BMI 37.16 kg/m       EXAM:  There were no vitals taken for this virtual visit.  Gen: well appearing, nad, pleasant and conversant  HEENT: EOMI  Neuro: A&O    MEDICATIONS: MEDICATIONS IN THIS CHART MAY NOT BE ACCURATE.  Current Outpatient Medications "   Medication Sig Dispense Refill     Flaxseed, Linseed, (FLAXSEED OIL PO) Take as directed       lisinopril (ZESTRIL) 20 MG tablet Take 1 tablet (20 mg) by mouth daily 90 tablet 3     Multiple Vitamin (MULTIVITAMINS PO) Take 1 tablet by mouth daily.       NIFEdipine ER OSMOTIC (PROCARDIA XL) 90 MG 24 hr tablet Take 1 tablet (90 mg) by mouth daily Please keep 10-23-20 clinic appt for refills. 90 tablet 3     phentermine (ADIPEX-P) 37.5 MG tablet 1/2 tab in am. 15 tablet 3       Weight Loss Medication History Reviewed With Patient 12/9/2021   Which weight loss medications are you currently taking on a regular basis?  None   If you are not taking a weight loss medication that was prescribed to you, please indicate why: It did not seem to be helping me   Are you having any side effects from the weight loss medication that we have prescribed you? No   If you are having side effects please describe: -       ASSESSMENT:   Obesity, Body mass index is 37.16 kg/m .  exacerbation during COVID-19 pandemic    Medication:  The patient will begin medication in pursuit of improved medical status as influenced by body weight. She will start Ozempic or other GLP-1 agonist as covered by insurance. There is a mutual understanding of the goals and risks of this therapy. The patient is in agreement. She is educated on dosage regimen and possible side effects.    PLAN:   Patient Instructions:    Nice to talk with you today in virtual clinic!    Please call Dr. Del Castillo [phone] and get into see her.    Start Ozempic 0.25 mg subcutaneous qwk x 4 wks, then if tolerated, plan to incr to 0.5 mg qwk x 4 wk, then incr to 1.0 mg qwk and stay at 1.0 mg weekly.    Please go to any [facility] lab. Follow standard safety precautions for COVID-19, practice social isolation, hand hygiene, do not touch your face, nose, or mouth, wear mask if have to go out in public to be respectful of community.  Please contact us to schedule at any of our  "[facility] lab locations. Call [phone] ([phone]), select option [phone].    Please start monitoring your blood pressure and if it is elevated, follow-up with your primary care provider and  Blood pressure goal: self-monitor, meditate, <2g sodium dietary restriction, stop NSAIDS (non-steroidal anti-inflammatory medications such as naproxen or Aleve and ibuprofen or Advil and use alternatives) goal <130/80  A helpful device that has been standardized is called OMRON and is usually available on Amazon.com    Food goal: go back to 1400 calories, meal replacements, food journal     Activity goal: using home stationary bike 30 min daily    Ht 1.778 m (5' 10\")   Wt 117.5 kg (259 lb)   BMI 37.16 kg/m         Weight goal: weigh self 2x weekly and lose 1-2 lbs weekly  [employer] roberto ([employer] sent her a scale)     Medication goal: start Ozempic weekly    Blood pressure goal: <130/80, continue to self-monitor     Meal Replacement Products:     Here is the link to our new e-store where you can purchase our meal replacement products     [facility] E-Pure360/store     The one week starter kit is a great way to sample a variety of products and see what works for you.     If you want more information about the product go to: [employer]     Free Shipping for orders over $75     Benefits of meal replacements products:     Portion and calorie control  Improved nutrition  Structured eating  Simplified food choices  Avoid contact with trigger foods    Structured Program goal: as per [employer] www.[employer].BitGo    Interested in working with a health ?  Health coaches work with you to improve your overall health and wellbeing.  They look at the whole person, and may involve discussion of different areas of life, including, but not limited to the four pillars of health (sleep, exercise, nutrition, and stress management). Discuss with your care team if you would like to start " working a health .     Health Coaching-3 Pack:      $99 for three health coaching visits (self pay; insurance does not cover health coaching)    Visits are done via phone at this time    Coaching is a partnership between the  and the client; Coaches do not prescribe or diagnose    Coaching helps inspire the client to reach his/her personal goals   - To schedule your first health  visit, call  [phone]  - To find out more about health coaching, contact Health  Citlali at [email or web address]    Psychological Providers    Check with your insurance to see if the psychologist is covered, if not, ask your insurance company for a referral.    [facility]   Mayra Garner, PhD, LP   [phone]  Izabel Holland, PhD, LP  [phone]  Kasia Del Toro, PhD                 [phone]    Juan Francisco Zuniga, Bjy,LP             [phone]    [place]  Mariely Velasco, PhD, LP  [phone]      [facility] Service:           [phone]  We send a referral and patient is notified.    [facility]:  [phone]  Bj VencesyD, [facility]  Paula Ventura PSyD, LP         [phone]    [facility]   To schedule, please call member services on the back of your card.    SHELDON Lockwood PsyD,LP,ABPP [phone]    [place]  Esa Renee MA, [phone]    [place]  Bj RomeroyD, [phone]    [place]  Bj HodgeyD, [phone]  (fibromyalgia issues)    FREDERIC Joseph  [phone]    [place]  Bj YoungbloodyD,LP  [phone]  Derick Morse, PhD, LP  [phone]  Derick Neal, PhD, LP             [phone]    [place]  Edgard Dasilva , [facility], [phone]    [place]  Anamaria La, PsyD, [phone]    FREDERIC Crawford, [facility],    [phone]     [facility]  [phone]   Lizeth Mendes MA, LP  - ext. [phone]  Derick Enrique, PhD, LP - ext [phone]  Ori Montano, Ciro, LP - ext [phone]    [place]  Beny Dupont, PhD, [phone]  Dale Gimenez, Ciro, [phone]          [place] Peterson, PhD, [phone]    [facility]. [place] Stovall PsyD,   [phone]          Call updates to MEHNAZ Cates    [phone]  Last updated: 2019       RTC: 2 months      [place] WishDr. Umu givens MD, MPH  Endocrinologist      Video-Visit Details    Type of service:  Video Visit    Video Start: 12/10/2021 08:48 am  Stop: 12/10/2021 09:03 am    Originating Location (pt. Location): Home    Distant Location (provider location):  [facility] [place]     Platform used for Video Visit: Nella       Total Time: 30 min spent on chart review, evaluation, management, counseling, education, & motivational interviewing on the day of encounter

## 2021-12-10 NOTE — NURSING NOTE
"(   Chief Complaint   Patient presents with     Follow Up     2 month weight management follow up.    )    ( Weight: 259 lb (Pt reported) )  ( Height: 5' 10\" (Pt reported) )  ( BMI (Calculated): 37.16 )  (   )  ( Cumulative weight loss (lbs): -34 )  ( Last Visits Weight: 245 lb 3.2 oz )  ( Wt change since last visit (lbs): 13.8 )  (   )  (   )  (   Patient Active Problem List   Diagnosis     Obesity     PCOS (polycystic ovarian syndrome)     Essential hypertension     Hyperlipidemia     Vitamin D deficiency     Sciatica     Chronic hypertension in pregnancy     Chronic hypertension with superimposed preeclampsia     S/P  section    )  (   Current Outpatient Medications   Medication Sig Dispense Refill     Flaxseed, Linseed, (FLAXSEED OIL PO) Take as directed       lisinopril (ZESTRIL) 20 MG tablet Take 1 tablet (20 mg) by mouth daily 90 tablet 3     Multiple Vitamin (MULTIVITAMINS PO) Take 1 tablet by mouth daily.       NIFEdipine ER OSMOTIC (PROCARDIA XL) 90 MG 24 hr tablet Take 1 tablet (90 mg) by mouth daily Please keep 10-23-20 clinic appt for refills. 90 tablet 3     phentermine (ADIPEX-P) 37.5 MG tablet 1/2 tab in am. 15 tablet 3    )  ( Diabetes Eval:    )    ( Pain Eval:  Extreme Pain (8) )    ( Wound Eval:       )    (   History   Smoking Status     Never Smoker   Smokeless Tobacco     Never Used    )    ( Signed By:  Mackenzie Arreaga [facility]; December 10, 2021; 8:27 AM )  "

## 2021-12-10 NOTE — TELEPHONE ENCOUNTER
NIFEDIPINE ER 90 MG TABLET  Last Written Prescription Date:  10/23/2020  Last Fill Quantity: 90,   # refills: 3  Last Office Visit : 12/10/2021  Future Office visit:  None  90 Tabs sent pharm 12/10/2021      Rebekah John RN  Central Triage Red Flags/Med Refills

## 2021-12-10 NOTE — LETTER
12/10/2021       RE: Holli Saxena  [address]  [facility] [address]     Dear Colleague,    Thank you for referring your patient, Holli Saxena, to the [facility]. Please see a copy of my visit note below.    Holli is a 46 year old who is being evaluated via a billable video visit.      How would you like to obtain your AVS? MyChart  If the video visit is dropped, the invitation should be resent by: Text to cell phone: [phone]  Will anyone else be joining your video visit? No    Return Medical Weight Management Note     Holli Saxena  MRN:  [id number]  :  1975  ALIA:  12/10/2021    Dear Colleagues,    I had the pleasure of seeing your patient Holli Saxena. She is a 46 year old female who I am continuing to see for treatment of obesity related to:    Hypertension  Hyperlipidemia   PCOS    INTERVAL HISTORY:  Last visit w/ me was 10/23/2020 and has not sought out care 2/2 COVID-19 pandemic. Wants help with weight regain. Is willing to start injection weekly of GLP-1 agonist. Denies ho pancreatitis or thyroid cancer. There are no recent vital signs or visits in [facility]. Patient says she is self-monitoring blood pressure and it has been ok. She is willing to do video or phone visits preferred over in-person 2/2 pandemic.    CURRENT WEIGHT:   259 lbs 0 oz    Initial Weight (lbs): 225 lbs  Last Visits Weight: 111.2 kg (245 lb 3.2 oz)  Cumulative weight loss (lbs): -34  Weight Loss Percentage: -15.11%    Changes and Difficulties 2021   I have made the following changes to my diet since my last visit: Nothing   With regards to my diet, I am still struggling with: Eating too much.   I have made the following changes to my activity/exercise since my last visit: Having back issues which is making exercise uncomfortable.   With regards to my activity/exercise, I am still struggling with:  "Sciatica       VITALS:  Ht 1.778 m (5' 10\")   Wt 117.5 kg (259 lb)   BMI 37.16 kg/m       EXAM:  There were no vitals taken for this virtual visit.  Gen: well appearing, nad, pleasant and conversant  HEENT: EOMI  Neuro: A&O    MEDICATIONS: MEDICATIONS IN THIS CHART MAY NOT BE ACCURATE.  Current Outpatient Medications   Medication Sig Dispense Refill     Flaxseed, Linseed, (FLAXSEED OIL PO) Take as directed       lisinopril (ZESTRIL) 20 MG tablet Take 1 tablet (20 mg) by mouth daily 90 tablet 3     Multiple Vitamin (MULTIVITAMINS PO) Take 1 tablet by mouth daily.       NIFEdipine ER OSMOTIC (PROCARDIA XL) 90 MG 24 hr tablet Take 1 tablet (90 mg) by mouth daily Please keep 10-23-20 clinic appt for refills. 90 tablet 3     phentermine (ADIPEX-P) 37.5 MG tablet 1/2 tab in am. 15 tablet 3       Weight Loss Medication History Reviewed With Patient 12/9/2021   Which weight loss medications are you currently taking on a regular basis?  None   If you are not taking a weight loss medication that was prescribed to you, please indicate why: It did not seem to be helping me   Are you having any side effects from the weight loss medication that we have prescribed you? No   If you are having side effects please describe: -       ASSESSMENT:   Obesity, Body mass index is 37.16 kg/m .  exacerbation during COVID-19 pandemic    Medication:  The patient will begin medication in pursuit of improved medical status as influenced by body weight. She will start Ozempic or other GLP-1 agonist as covered by insurance. There is a mutual understanding of the goals and risks of this therapy. The patient is in agreement. She is educated on dosage regimen and possible side effects.    PLAN:   Patient Instructions:    Nice to talk with you today in virtual clinic!    Please call Dr. Del Castillo [phone] and get into see her.    Start Ozempic 0.25 mg subcutaneous qwk x 4 wks, then if tolerated, plan to incr to 0.5 mg qwk x 4 wk, then incr to 1.0 " "mg qwk and stay at 1.0 mg weekly.    Please go to any [facility] lab. Follow standard safety precautions for COVID-19, practice social isolation, hand hygiene, do not touch your face, nose, or mouth, wear mask if have to go out in public to be respectful of community.  Please contact us to schedule at any of our [facility] lab locations. Call [phone] ([phone]), select option 1.    Please start monitoring your blood pressure and if it is elevated, follow-up with your primary care provider and  Blood pressure goal: self-monitor, meditate, <2g sodium dietary restriction, stop NSAIDS (non-steroidal anti-inflammatory medications such as naproxen or Aleve and ibuprofen or Advil and use alternatives) goal <130/80  A helpful device that has been standardized is called OMRON and is usually available on Amazon.com    Food goal: go back to 1400 calories, meal replacements, food journal     Activity goal: using home stationary bike 30 min daily    Ht 1.778 m (5' 10\")   Wt 117.5 kg (259 lb)   BMI 37.16 kg/m         Weight goal: weigh self 2x weekly and lose 1-2 lbs weekly  [address] roberto ([employer] sent her a scale)     Medication goal: start Ozempic weekly    Blood pressure goal: <130/80, continue to self-monitor     Meal Replacement Products:     Here is the link to our new e-store where you can purchase our meal replacement products     [facility] E-Store  LP [email or web address]     The one week starter kit is a great way to sample a variety of products and see what works for you.     If you want more information about the product go to: [employer]     Free Shipping for orders over $75     Benefits of meal replacements products:     Portion and calorie control  Improved nutrition  Structured eating  Simplified food choices  Avoid contact with trigger foods    Structured Program goal: as per [place] www.[employer]    Interested in working with a health ?  Health " coaches work with you to improve your overall health and wellbeing.  They look at the whole person, and may involve discussion of different areas of life, including, but not limited to the four pillars of health (sleep, exercise, nutrition, and stress management). Discuss with your care team if you would like to start working a health .     Health Coaching-3 Pack:      $99 for three health coaching visits (self pay; insurance does not cover health coaching)    Visits are done via phone at this time    Coaching is a partnership between the  and the client; Coaches do not prescribe or diagnose    Coaching helps inspire the client to reach his/her personal goals   - To schedule your first health  visit, call  [phone]  - To find out more about health coaching, contact Health  Citlali at [email or web address]    Psychological Providers    Check with your insurance to see if the psychologist is covered, if not, ask your insurance company for a referral.    [facility]   Mayra Garner, PhD, LP   [phone]  Izabel Holland, PhD, LP  [phone]  Kasia Del Toro, PhD                 [phone]    Juan Francisco Zuniga, Desiraey,LP             [phone]    [place]  Mariely Velasco, PhD, LP  [phone]      [facility] Mental Health Service:           [phone]  We send a referral and patient is notified.    [facility]:  [phone]  Lissy Mott PsyD, [facility]  DESIRAE RayayD, LP         [phone]    [facility]   To schedule, please call member services on the back of your card.    SHELDON Lockwood PsyD,LP,ABPP [phone]    [place]  Esa Renee MA, [phone]    [place]  Meghann Villanueva PsyD, [phone]    [place]  Jackeline Chacon PsyD, [phone]  (fibromyalgia issues)    FREDERIC Joseph  [phone]    [facility]  Adrienne,Ps,LP  [phone]  Derick Morse, PhD, LP  [phone]  Derick Neal, PhD, LP             [phone]    [place]  Edgard Dasilva , [facility], LMFT [phone]    [place]  Anamaria Bessie, Bj, [phone]    FREDERIC Crawford, Ps, LP   [phone]     [place]      Outreach [phone]   Lizeth Mendes MA, LP  - ext. [phone]  Derick Enrique, PhD, LP - ext [phone]  Ori Montano PsyD, LP - ext 110    [place]  Beny Dupont, PhD, -[phone]  Dale Gimenez PsyD, [phone]          [place]  Janel Peterson, PhD, [phone]    [place]onal[place] Stovall PsyD, LP  [phone]          Call updates to MEHNAZ Cates    [phone]  Last updated: 2019       RTC: 2 months            Again, thank you for allowing me to participate in the care of your patient.      Sincerely,    Umu Mahoney MD

## 2021-12-13 NOTE — TELEPHONE ENCOUNTER
LISINOPRIL  20MG      Last Written Prescription Date:  10/23/20  Last Fill Quantity: 90,   # refills: 3  Last Office Visit : 12/10/21  Future Office visit:  NONE      RTC 2 MOS   Routing refill request to provider for review/approval because:  Does WT want to continue/RF med?  No med direction

## 2021-12-14 RX ORDER — LISINOPRIL 20 MG/1
TABLET ORAL
Qty: 90 TABLET | Refills: 3 | Status: SHIPPED | OUTPATIENT
Start: 2021-12-14 | End: 2022-06-08

## 2022-02-05 ENCOUNTER — LAB (OUTPATIENT)
Dept: LAB | Facility: CLINIC | Age: 47
End: 2022-02-05
Payer: COMMERCIAL

## 2022-02-05 DIAGNOSIS — E66.01 MORBID OBESITY (H): ICD-10-CM

## 2022-02-05 PROCEDURE — 36415 COLL VENOUS BLD VENIPUNCTURE: CPT

## 2022-02-05 PROCEDURE — 80053 COMPREHEN METABOLIC PANEL: CPT

## 2022-02-07 LAB
ALBUMIN SERPL-MCNC: 3.9 G/DL (ref 3.4–5)
ALP SERPL-CCNC: 95 U/L (ref 40–150)
ALT SERPL W P-5'-P-CCNC: 139 U/L (ref 0–50)
ANION GAP SERPL CALCULATED.3IONS-SCNC: 3 MMOL/L (ref 3–14)
AST SERPL W P-5'-P-CCNC: 81 U/L (ref 0–45)
BILIRUB SERPL-MCNC: 0.5 MG/DL (ref 0.2–1.3)
BUN SERPL-MCNC: 8 MG/DL (ref 7–30)
CALCIUM SERPL-MCNC: 9 MG/DL (ref 8.5–10.1)
CHLORIDE BLD-SCNC: 105 MMOL/L (ref 94–109)
CO2 SERPL-SCNC: 26 MMOL/L (ref 20–32)
CREAT SERPL-MCNC: 0.68 MG/DL (ref 0.52–1.04)
GFR SERPL CREATININE-BSD FRML MDRD: >90 ML/MIN/1.73M2
GLUCOSE BLD-MCNC: 93 MG/DL (ref 70–99)
POTASSIUM BLD-SCNC: 4.6 MMOL/L (ref 3.4–5.3)
PROT SERPL-MCNC: 7.4 G/DL (ref 6.8–8.8)
SODIUM SERPL-SCNC: 134 MMOL/L (ref 133–144)

## 2022-02-09 DIAGNOSIS — E66.01 MORBID OBESITY (H): ICD-10-CM

## 2022-02-14 RX ORDER — SEMAGLUTIDE 1.34 MG/ML
1 INJECTION, SOLUTION SUBCUTANEOUS WEEKLY
Qty: 3 ML | Refills: 1 | Status: SHIPPED | OUTPATIENT
Start: 2022-02-14 | End: 2022-04-14

## 2022-02-14 NOTE — TELEPHONE ENCOUNTER
semaglutide (OZEMPIC) 2 MG/1.5ML SOPN pen      Start 0.25 mg subcutaneous qwk x 4 wks, then if tolerated, plan to incr to 0.5 mg qwk x 4 wk, then incr to 1.0 mg qwk and stay at 1.0 mg weekly  Last Written Prescription Date:  12-10-21  Last Fill Quantity: 3 ml,   # refills: 3  Last Office Visit : 12-10-21  Future Office visit:  3-8-22    Routing refill request to provider for review/approval because:  pharm request: Patient is no at 1 mg weekly. Please send new eRX for Ozempic 1 mg pen device.

## 2022-03-08 ENCOUNTER — VIRTUAL VISIT (OUTPATIENT)
Dept: ENDOCRINOLOGY | Facility: CLINIC | Age: 47
End: 2022-03-08
Payer: COMMERCIAL

## 2022-03-08 VITALS — HEIGHT: 70 IN | BODY MASS INDEX: 36.22 KG/M2 | WEIGHT: 253 LBS

## 2022-03-08 DIAGNOSIS — E66.01 MORBID OBESITY (H): Primary | ICD-10-CM

## 2022-03-08 DIAGNOSIS — K75.9 HEPATITIS: ICD-10-CM

## 2022-03-08 DIAGNOSIS — I10 HYPERTENSION, UNSPECIFIED TYPE: ICD-10-CM

## 2022-03-08 DIAGNOSIS — I10 BENIGN ESSENTIAL HYPERTENSION: ICD-10-CM

## 2022-03-08 DIAGNOSIS — E66.811 CLASS 1 OBESITY WITH SERIOUS COMORBIDITY AND BODY MASS INDEX (BMI) OF 34.0 TO 34.9 IN ADULT, UNSPECIFIED OBESITY TYPE: ICD-10-CM

## 2022-03-08 DIAGNOSIS — E28.2 PCOS (POLYCYSTIC OVARIAN SYNDROME): ICD-10-CM

## 2022-03-08 PROCEDURE — 99215 OFFICE O/P EST HI 40 MIN: CPT | Mod: 95 | Performed by: INTERNAL MEDICINE

## 2022-03-08 RX ORDER — NIFEDIPINE 30 MG/1
30 TABLET, EXTENDED RELEASE ORAL DAILY
Qty: 90 TABLET | Refills: 1 | Status: SHIPPED | OUTPATIENT
Start: 2022-03-08 | End: 2022-06-08

## 2022-03-08 NOTE — PROGRESS NOTES
"During this virtual visit the patient is located in [place], patient verifies this as the location during the entirety of this visit.     Holli is a 46 year old who is being evaluated via a billable video visit.      How would you like to obtain your AVS? Ayushhart  If the video visit is dropped, the invitation should be resent by: Call to [phone]  Will anyone else be joining your video visit? No               Comprehensive Interdisciplinary Medical Weight Management Follow-up  Consult       Holli Saxena  MRN:  [id number]  :  1975  ALIA:  3/8/2022    Dear Colleagues,    I had the pleasure of seeing your patient Holli Saxena. She is a 46 year old female who I am continuing to see for treatment of obesity related to:    Hypertension  Hyperlipidemia   PCOS  Recently increased LFTs    INTERVAL HISTORY:  She is taking Ozempic 1 mg weekly (this is her 3rd shot of this dose takes on Sundays) with side effects and is having diarrhea x 4 since starting Ozempic, which she has after taking Ozempic, has nausea and \"pewky burps\" . Due for LFTs today (started Ozempic in Dec 2021, LFT elevation occurred in 2022). LTFs - since they have not been done in between our visits as was the plan I had recommended and she did not follow-up w/ GI MD (referral is likely needed?). she has not seen Dr. Del Castillo in between but had a My Chart encounter with clinic staff about PAP? She has not actually seen Dr. Del Castillo in quite a few years and wants to establish primary care closer to her home in [facility]. She does not drink alcohol, takes Midol for her menses, does not take Tylenol.  Weight is down from 259 to 253 lbs today from when I saw her 12/10/21.  Denies abdominal pain.  Is self-monitoring blood pressure, see data from encounter from Dr. Del Castillo from 22. Is taking lisinopril and nifedipine.    Does not look like she has had screening for hyperaldosteronism and she reports she has had HTN since age 28 and " "BMI at that time was about 31-32.    CURRENT WEIGHT:   253 lbs 0 oz    Initial Weight (lbs): 225 lbs  Last Visits Weight: 117.5 kg (259 lb)  Cumulative weight loss (lbs): -28  Weight Loss Percentage: -12.44%    Changes and Difficulties 3/7/2022   I have made the following changes to my diet since my last visit: I have reduced my caloric intake.   With regards to my diet, I am still struggling with: Adding vegetables. Getting to 1400 calories per day. I m averaging 1700.   I have made the following changes to my activity/exercise since my last visit: I am doing yoga once a week and 20 minutes/3 times a week. Trying to get to 30min/5 days a week.   With regards to my activity/exercise, I am still struggling with: My back issues and lack of motivation after a long day at work. I want to incorporate so activity in the middle of my day to reduce stress.       VITALS:  Ht 1.778 m (5' 10\")   Wt 114.8 kg (253 lb)   BMI 36.30 kg/m       EXAM:  There were no vitals taken for this virtual visit  Gen: well appearing, nad, pleasant and conversant  HEENT: EOMI  Neuro: A&O    LABS:  Reviewed, see Epic   Results for TATE PEARL (MRN [id number]) as of 3/8/2022 17:04   Ref. Range 2/5/2022 13:09   Sodium Latest Ref Range: 133 - 144 mmol/L 134   Potassium Latest Ref Range: 3.4 - 5.3 mmol/L 4.6   Chloride Latest Ref Range: 94 - 109 mmol/L 105   Carbon Dioxide Latest Ref Range: 20 - 32 mmol/L 26   Urea Nitrogen Latest Ref Range: 7 - 30 mg/dL 8   Creatinine Latest Ref Range: 0.52 - 1.04 mg/dL 0.68   GFR Estimate Latest Ref Range: >60 mL/min/1.73m2 >90   Calcium Latest Ref Range: 8.5 - 10.1 mg/dL 9.0   Anion Gap Latest Ref Range: 3 - 14 mmol/L 3   Albumin Latest Ref Range: 3.4 - 5.0 g/dL 3.9   Protein Total Latest Ref Range: 6.8 - 8.8 g/dL 7.4   Bilirubin Total Latest Ref Range: 0.2 - 1.3 mg/dL 0.5   Alkaline Phosphatase Latest Ref Range: 40 - 150 U/L 95   ALT Latest Ref Range: 0 - 50 U/L 139 (H)   AST Latest Ref Range: 0 - 45 U/L " 81 (H)       MEDICATIONS: MEDICATIONS IN THIS CHART MAY NOT BE ACCURATE.    Current Outpatient Medications   Medication Sig Dispense Refill     Flaxseed, Linseed, (FLAXSEED OIL PO) Take as directed       lisinopril (ZESTRIL) 20 MG tablet TAKE 1 TABLET BY MOUTH EVERY DAY 90 tablet 3     Multiple Vitamin (MULTIVITAMINS PO) Take 1 tablet by mouth daily.       NIFEdipine ER (ADALAT CC) 90 MG 24 hr tablet Take 1 tablet (90 mg) by mouth daily 90 tablet 0     phentermine (ADIPEX-P) 37.5 MG tablet 1/2 tab in am. 15 tablet 3     semaglutide (OZEMPIC) 2 MG/1.5ML SOPN pen Start 0.25 mg subcutaneous qwk x 4 wks, then if tolerated, plan to incr to 0.5 mg qwk x 4 wk, then incr to 1.0 mg qwk and stay at 1.0 mg weekly. 3 mL 3     Semaglutide, 1 MG/DOSE, (OZEMPIC, 1 MG/DOSE,) 4 MG/3ML SOPN Inject 1 mg Subcutaneous once a week 3 mL 1       Weight Loss Medication History Reviewed With Patient 3/7/2022   Which weight loss medications are you currently taking on a regular basis?  Ozempic   If you are not taking a weight loss medication that was prescribed to you, please indicate why: -   Are you having any side effects from the weight loss medication that we have prescribed you? Yes   If you are having side effects please describe: Nasuea and diarrhea for a few days after the injection. Inna       ASSESSMENT:   1) Obesity w/ response to Ozempic, patient is having some diarrhea and has had this about 4 times total since starting Ozempic. Denies any abdominal pain. She has slightly increased LFTs and it is not known what they were prior to starting Ozempic. She has no personal history of thyroid cancer or known pancreatitis. Labs will be done today to monitor. GI MD referral placed for increased LFTs. She may end up having only NAFLD  2) For HTN, nisha/renin ordered as screening. BPs reviewed from data she submitted, they are on the low to normal side, so will reduce her nifedipine dose from 90 to 30 mg every day, with plan to try to stop  "this medication. She had to be on more BP medication when she was taking phentermine, but is not taking it now, so maybe able to just use lisinopril.  She asked for fasting lipids, so these were ordered as well.  Information given to help bridge her primary care, given her need.   Body mass index is 36.3 kg/m .    PLAN:   Patient Instructions:    Nice to talk with you today in virtual clinic    Congrats on your success so far!    I understand you want to be seen close to your home. Please establish primary care at [facility] with an Internal Medicine Physician. To do that, please call [phone] and ask for Internal Medicine MD Primary Care clinic appointment.    Please see GI MD re: increased LFTs (liver function tests)    Please go to any [facility] lab. Follow standard safety precautions for COVID-19, practice social isolation, hand hygiene, do not touch your face, nose, or mouth, wear mask if have to go out in public to be respectful of community.  Please contact us to schedule at any of our [facility] lab locations. Call [phone] ([phone]), select option [phone].     Please continue monitoring your blood pressure and if it is elevated, follow-up with your primary care provider and  Blood pressure goal: self-monitor, meditate, <2g sodium dietary restriction, stop NSAIDS (non-steroidal anti-inflammatory medications such as naproxen or Aleve and ibuprofen or Advil and use alternatives) goal <130/80  A helpful device that has been standardized is called OMRON and is usually available on [employer]     Food goal: go back to 1400 calories, meal replacements, food journal     Activity goal: using home stationary bike 30 min daily     Ht 1.778 m (5' 10\")   Wt 117.5 kg (259 lb)   BMI 37.16 kg/m       Weight goal: weigh self 2x weekly and lose 1-2 lbs weekly  [employer] roberto ([facility] sent her a scale)     Medication goal: for now, ok to continue Ozempic weekly 1 mg, lisinopril 20 mg daily, reduce " nifedipine from 90 to 30 mg daily     Blood pressure goal: <130/80, continue to self-monitor    RTC: quarterly as needed    Please use [employer] to schedule your appointment(s) or call [phone] to schedule in [facility]    Best Wishes,  Dr. Umu Mahoney MD, MPH  Endocrinologist    Video-Visit Details    Type of service:  Video Visit    Video Start: 03/08/2022 01:52 pm  Stop: 03/08/2022 02:15 pm    Originating Location (pt. Location): Home    Distant Location (provider location):  [facility] [place]     Platform used for Video Visit: Nella Sam NREMT      Total Time: 52 min spent on chart review, evaluation, management, counseling, education, & motivational interviewing on the day of encounter

## 2022-03-08 NOTE — LETTER
"3/8/2022       RE: Holli Saxena  [address]  [place] [address]     Dear Colleague,    Thank you for referring your patient, Holli Saxena, to the [facility] [place] at [facility]. Please see a copy of my visit note below.    During this virtual visit the patient is located in [place], patient verifies this as the location during the entirety of this visit.     Holli is a 46 year old who is being evaluated via a billable video visit.      How would you like to obtain your AVS? MyChart  If the video visit is dropped, the invitation should be resent by: Call to [phone]  Will anyone else be joining your video visit? No               Comprehensive Interdisciplinary Medical Weight Management Follow-up  Consult       Holli Saxena  MRN:  [id number]  :  1975  ALIA:  3/8/2022    Dear Colleagues,    I had the pleasure of seeing your patient Holli Saxena. She is a 46 year old female who I am continuing to see for treatment of obesity related to:    Hypertension  Hyperlipidemia   PCOS  Recently increased LFTs    INTERVAL HISTORY:  She is taking Ozempic 1 mg weekly (this is her 3rd shot of this dose takes on Sundays) with side effects and is having diarrhea x 4 since starting Ozempic, which she has after taking Ozempic, has nausea and \"pewky burps\" . Due for LFTs today (started Ozempic in Dec 2021, LFT elevation occurred in 2022). LTFs - since they have not been done in between our visits as was the plan I had recommended and she did not follow-up w/ GI MD (referral is likely needed?). she has not seen Dr. Del Castillo in between but had a My Chart encounter with clinic staff about PAP? She has not actually seen Dr. Del Castillo in quite a few years and wants to establish primary care closer to her home in [place] [facility]. She does not drink alcohol, takes Midol for her menses, does not take Tylenol.  Weight is " "down from 259 to 253 lbs today from when I saw her 12/10/21.  Denies abdominal pain.  Is self-monitoring blood pressure, see data from encounter from Dr. Del Castillo from 2/7/22. Is taking lisinopril and nifedipine.    Does not look like she has had screening for hyperaldosteronism and she reports she has had HTN since age 28 and BMI at that time was about 31-32.    CURRENT WEIGHT:   253 lbs 0 oz    Initial Weight (lbs): 225 lbs  Last Visits Weight: 117.5 kg (259 lb)  Cumulative weight loss (lbs): -28  Weight Loss Percentage: -12.44%    Changes and Difficulties 3/7/2022   I have made the following changes to my diet since my last visit: I have reduced my caloric intake.   With regards to my diet, I am still struggling with: Adding vegetables. Getting to 1400 calories per day. I m averaging 1700.   I have made the following changes to my activity/exercise since my last visit: I am doing yoga once a week and 20 minutes/3 times a week. Trying to get to 30min/5 days a week.   With regards to my activity/exercise, I am still struggling with: My back issues and lack of motivation after a long day at work. I want to incorporate so activity in the middle of my day to reduce stress.       VITALS:  Ht 1.778 m (5' 10\")   Wt 114.8 kg (253 lb)   BMI 36.30 kg/m       EXAM:  There were no vitals taken for this virtual visit  Gen: well appearing, nad, pleasant and conversant  HEENT: EOMI  Neuro: A&O    LABS:  Reviewed, see Epic   Results for TATE PEARL (MRN [id number]) as of 3/8/2022 17:04   Ref. Range 2/5/2022 13:09   Sodium Latest Ref Range: 133 - 144 mmol/L 134   Potassium Latest Ref Range: 3.4 - 5.3 mmol/L 4.6   Chloride Latest Ref Range: 94 - 109 mmol/L 105   Carbon Dioxide Latest Ref Range: 20 - 32 mmol/L 26   Urea Nitrogen Latest Ref Range: 7 - 30 mg/dL 8   Creatinine Latest Ref Range: 0.52 - 1.04 mg/dL 0.68   GFR Estimate Latest Ref Range: >60 mL/min/1.73m2 >90   Calcium Latest Ref Range: 8.5 - 10.1 mg/dL 9.0   Anion " Gap Latest Ref Range: 3 - 14 mmol/L 3   Albumin Latest Ref Range: 3.4 - 5.0 g/dL 3.9   Protein Total Latest Ref Range: 6.8 - 8.8 g/dL 7.4   Bilirubin Total Latest Ref Range: 0.2 - 1.3 mg/dL 0.5   Alkaline Phosphatase Latest Ref Range: 40 - 150 U/L 95   ALT Latest Ref Range: 0 - 50 U/L 139 (H)   AST Latest Ref Range: 0 - 45 U/L 81 (H)       MEDICATIONS: MEDICATIONS IN THIS CHART MAY NOT BE ACCURATE.    Current Outpatient Medications   Medication Sig Dispense Refill     Flaxseed, Linseed, (FLAXSEED OIL PO) Take as directed       lisinopril (ZESTRIL) 20 MG tablet TAKE 1 TABLET BY MOUTH EVERY DAY 90 tablet 3     Multiple Vitamin (MULTIVITAMINS PO) Take 1 tablet by mouth daily.       NIFEdipine ER (ADALAT CC) 90 MG 24 hr tablet Take 1 tablet (90 mg) by mouth daily 90 tablet 0     phentermine (ADIPEX-P) 37.5 MG tablet 1/2 tab in am. 15 tablet 3     semaglutide (OZEMPIC) 2 MG/1.5ML SOPN pen Start 0.25 mg subcutaneous qwk x 4 wks, then if tolerated, plan to incr to 0.5 mg qwk x 4 wk, then incr to 1.0 mg qwk and stay at 1.0 mg weekly. 3 mL 3     Semaglutide, 1 MG/DOSE, (OZEMPIC, 1 MG/DOSE,) 4 MG/3ML SOPN Inject 1 mg Subcutaneous once a week 3 mL 1       Weight Loss Medication History Reviewed With Patient 3/7/2022   Which weight loss medications are you currently taking on a regular basis?  Ozempic   If you are not taking a weight loss medication that was prescribed to you, please indicate why: -   Are you having any side effects from the weight loss medication that we have prescribed you? Yes   If you are having side effects please describe: Nasuea and diarrhea for a few days after the injection. Inna       ASSESSMENT:   1) Obesity w/ response to Ozempic, patient is having some diarrhea and has had this about 4 times total since starting Ozempic. Denies any abdominal pain. She has slightly increased LFTs and it is not known what they were prior to starting Ozempic. She has no personal history of thyroid cancer or known  pancreatitis. Labs will be done today to monitor. JENNIFER HARRIS referral placed for increased LFTs. She may end up having only NAFLD  2) For HTN, nisha/renin ordered as screening. BPs reviewed from data she submitted, they are on the low to normal side, so will reduce her nifedipine dose from 90 to 30 mg every day, with plan to try to stop this medication. She had to be on more BP medication when she was taking phentermine, but is not taking it now, so maybe able to just use lisinopril.  She asked for fasting lipids, so these were ordered as well.  Information given to help bridge her primary care, given her need.   Body mass index is 36.3 kg/m .    PLAN:   Patient Instructions:    Nice to talk with you today in virtual clinic    Congrats on your success so far!    I understand you want to be seen close to your home. Please establish primary care at [facility] with an Internal Medicine Physician. To do that, please call [phone] and ask for Internal Medicine MD Primary Care clinic appointment.    Please see JENNIFER HARRIS re: increased LFTs (liver function tests)    Please go to any [facility] lab. Follow standard safety precautions for COVID-19, practice social isolation, hand hygiene, do not touch your face, nose, or mouth, wear mask if have to go out in public to be respectful of community.  Please contact us to schedule at any of our [facility] lab locations. Call [phone] ([phone]), select option 1.     Please continue monitoring your blood pressure and if it is elevated, follow-up with your primary care provider and  Blood pressure goal: self-monitor, meditate, <2g sodium dietary restriction, stop NSAIDS (non-steroidal anti-inflammatory medications such as naproxen or Aleve and ibuprofen or Advil and use alternatives) goal <130/80  A helpful device that has been standardized is called OMRON and is usually available on [employer]     Food goal: go back to 1400 calories, meal replacements,  "food journal     Activity goal: using home stationary bike 30 min daily     Ht 1.778 m (5' 10\")   Wt 117.5 kg (259 lb)   BMI 37.16 kg/m       Weight goal: weigh self 2x weekly and lose 1-2 lbs weekly  [employer] roberto ([employer] sent her a scale)     Medication goal: for now, ok to continue Ozempic weekly 1 mg, lisinopril 20 mg daily, reduce nifedipine from 90 to 30 mg daily     Blood pressure goal: <130/80, continue to self-monitor    RTC: quarterly as needed    Please use [employer] to schedule your appointment(s) or call [phone] to schedule in [facility]    [place] Wishes,  Dr. Umu Mahoney MD, MPH  Endocrinologist    Video-Visit Details    Type of service:  Video Visit    Video Start: 03/08/2022 01:52 pm  Stop: 03/08/2022 02:15 pm    Originating Location (pt. Location): Home    Distant Location (provider location):  [facility] [place]     Platform used for Video Visit: Nella Sam NREMT      Total Time: 52 min spent on chart review, evaluation, management, counseling, education, & motivational interviewing on the day of encounter      "

## 2022-03-08 NOTE — NURSING NOTE
Chief Complaint   Patient presents with     Follow Up     Return medical weight management.       Vitals:    03/08/22 1322   Weight: 253 lb       Body mass index is 36.3 kg/m .      Remi Sam, EMT  [facility]

## 2022-03-08 NOTE — PATIENT INSTRUCTIONS
"Nice to talk with you today in virtual clinic    Congrats on your success so far!    I understand you want to be seen close to your home. Please establish primary care at [facility] with an Internal Medicine Physician. To do that, please call [phone] and ask for Internal Medicine MD Primary Care clinic appointment.    Please see GI MD re: increased LFTs (liver function tests)    Please go to any [facility] lab. Follow standard safety precautions for COVID-19, practice social isolation, hand hygiene, do not touch your face, nose, or mouth, wear mask if have to go out in public to be respectful of community.  Please contact us to schedule at any of our [facility] lab locations. Call [phone]), select option 1.     Please continue monitoring your blood pressure and if it is elevated, follow-up with your primary care provider and  Blood pressure goal: self-monitor, meditate, <2g sodium dietary restriction, stop NSAIDS (non-steroidal anti-inflammatory medications such as naproxen or Aleve and ibuprofen or Advil and use alternatives) goal <130/80  A helpful device that has been standardized is called OMRON and is usually available on [employer]     Food goal: go back to 1400 calories, meal replacements, food journal     Activity goal: using home stationary bike 30 min daily     Ht 1.778 m (5' 10\")   Wt 117.5 kg (259 lb)   BMI 37.16 kg/m       Weight goal: weigh self 2x weekly and lose 1-2 lbs weekly  [employer] roberto ([employer] sent her a scale)     Medication goal: for now, ok to continue Ozempic weekly 1 mg, lisinopril 20 mg daily, reduce nifedipine from 90 to 30 mg daily     Blood pressure goal: <130/80, continue to self-monitor    RTC: quarterly as needed    Please use [employer] to schedule your appointment(s) or call [phone] to schedule in [facility]    Best Wishes,  Dr. Umu Mahoney MD, MPH  Endocrinologist         "

## 2022-03-21 ENCOUNTER — LAB (OUTPATIENT)
Dept: LAB | Facility: CLINIC | Age: 47
End: 2022-03-21
Payer: COMMERCIAL

## 2022-03-21 DIAGNOSIS — K75.9 HEPATITIS: ICD-10-CM

## 2022-03-21 DIAGNOSIS — E66.811 CLASS 1 OBESITY WITH SERIOUS COMORBIDITY AND BODY MASS INDEX (BMI) OF 34.0 TO 34.9 IN ADULT, UNSPECIFIED OBESITY TYPE: ICD-10-CM

## 2022-03-21 DIAGNOSIS — E28.2 PCOS (POLYCYSTIC OVARIAN SYNDROME): ICD-10-CM

## 2022-03-21 DIAGNOSIS — I10 HYPERTENSION, UNSPECIFIED TYPE: ICD-10-CM

## 2022-03-21 LAB — LIPASE SERPL-CCNC: 182 U/L (ref 73–393)

## 2022-03-21 PROCEDURE — 82088 ASSAY OF ALDOSTERONE: CPT

## 2022-03-21 PROCEDURE — 83690 ASSAY OF LIPASE: CPT

## 2022-03-21 PROCEDURE — 99000 SPECIMEN HANDLING OFFICE-LAB: CPT

## 2022-03-21 PROCEDURE — 36415 COLL VENOUS BLD VENIPUNCTURE: CPT

## 2022-03-21 PROCEDURE — 80061 LIPID PANEL: CPT

## 2022-03-21 PROCEDURE — 84244 ASSAY OF RENIN: CPT | Mod: 90

## 2022-03-21 PROCEDURE — 80053 COMPREHEN METABOLIC PANEL: CPT

## 2022-03-21 PROCEDURE — 82150 ASSAY OF AMYLASE: CPT

## 2022-03-22 LAB
ALBUMIN SERPL-MCNC: 4 G/DL (ref 3.4–5)
ALDOST SERPL-MCNC: 10.1 NG/DL (ref 0–31)
ALP SERPL-CCNC: 90 U/L (ref 40–150)
ALT SERPL W P-5'-P-CCNC: 78 U/L (ref 0–50)
AMYLASE SERPL-CCNC: 62 U/L (ref 30–110)
ANION GAP SERPL CALCULATED.3IONS-SCNC: 8 MMOL/L (ref 3–14)
AST SERPL W P-5'-P-CCNC: 53 U/L (ref 0–45)
BILIRUB SERPL-MCNC: 0.5 MG/DL (ref 0.2–1.3)
BUN SERPL-MCNC: 9 MG/DL (ref 7–30)
CALCIUM SERPL-MCNC: 9.9 MG/DL (ref 8.5–10.1)
CHLORIDE BLD-SCNC: 105 MMOL/L (ref 94–109)
CHOLEST SERPL-MCNC: 191 MG/DL
CO2 SERPL-SCNC: 22 MMOL/L (ref 20–32)
CREAT SERPL-MCNC: 0.72 MG/DL (ref 0.52–1.04)
FASTING STATUS PATIENT QL REPORTED: YES
GFR SERPL CREATININE-BSD FRML MDRD: >90 ML/MIN/1.73M2
GLUCOSE BLD-MCNC: 129 MG/DL (ref 70–99)
HDLC SERPL-MCNC: 42 MG/DL
LDLC SERPL CALC-MCNC: 107 MG/DL
NONHDLC SERPL-MCNC: 149 MG/DL
POTASSIUM BLD-SCNC: 5 MMOL/L (ref 3.4–5.3)
PROT SERPL-MCNC: 8.4 G/DL (ref 6.8–8.8)
SODIUM SERPL-SCNC: 135 MMOL/L (ref 133–144)
TRIGL SERPL-MCNC: 208 MG/DL

## 2022-03-26 LAB — RENIN PLAS-CCNC: 41.4 NG/ML/HR

## 2022-04-12 DIAGNOSIS — E66.01 MORBID OBESITY (H): ICD-10-CM

## 2022-04-14 ENCOUNTER — TELEPHONE (OUTPATIENT)
Dept: SURGERY | Facility: CLINIC | Age: 47
End: 2022-04-14
Payer: COMMERCIAL

## 2022-04-14 RX ORDER — SEMAGLUTIDE 1.34 MG/ML
INJECTION, SOLUTION SUBCUTANEOUS
Qty: 3 ML | Refills: 1 | Status: SHIPPED | OUTPATIENT
Start: 2022-04-14 | End: 2022-06-08

## 2022-04-14 NOTE — TELEPHONE ENCOUNTER
OZEMPIC 1MG PER DOSE (1X4MG PEN) Inject 1 mg Subcutaneous once a week   Last Written Prescription Date:  2/14/22  Last Fill Quantity: 3 ml ,   # refills: 1  Last Office Visit : 3/8/22  Future Office visit:  None  Medication goal: for now, ok to continue Ozempic weekly 1 mg, lisinopril 20 mg daily, reduce nifedipine from 90 to 30 mg daily     Blood pressure goal: <130/80, continue to self-monitor     RTC: quarterly as needed  Refilled per protocol. Scheduling has been notified to contact the pt for appointment.

## 2022-04-14 NOTE — TELEPHONE ENCOUNTER
----- Message from Mayi Mcclelland RN sent at 4/14/2022  7:45 AM CDT -----  Regarding: Maru appt due June  Please call to schedule./ Maru due in June 2022    Thanks    I do not need any follow up on the scheduling of this appointment unless the patient will no longer be receiving care in our clinic.

## 2022-04-18 NOTE — TELEPHONE ENCOUNTER
RECORDS RECEIVED FROM: Internal   Appt Date: 05.03.2022   NOTES STATUS DETAILS   OFFICE NOTE from referring provider Internal 03.08.2022 Umu Mahoney MD   MEDICATION LIST Internal    LABS     BASIC METABOLIC PANEL Internal 10.07.2020   COMPLETE METABOLIC PANEL Internal 10.11.2017   COMPLETE BLOOD COUNT (CBC) Internal 10.11.2017

## 2022-05-03 ENCOUNTER — DOCUMENTATION ONLY (OUTPATIENT)
Dept: LAB | Facility: CLINIC | Age: 47
End: 2022-05-03

## 2022-05-03 ENCOUNTER — LAB (OUTPATIENT)
Dept: LAB | Facility: CLINIC | Age: 47
End: 2022-05-03
Payer: COMMERCIAL

## 2022-05-03 ENCOUNTER — PRE VISIT (OUTPATIENT)
Dept: GASTROENTEROLOGY | Facility: CLINIC | Age: 47
End: 2022-05-03
Payer: COMMERCIAL

## 2022-05-03 ENCOUNTER — OFFICE VISIT (OUTPATIENT)
Dept: GASTROENTEROLOGY | Facility: CLINIC | Age: 47
End: 2022-05-03
Attending: STUDENT IN AN ORGANIZED HEALTH CARE EDUCATION/TRAINING PROGRAM
Payer: COMMERCIAL

## 2022-05-03 VITALS
HEIGHT: 70 IN | BODY MASS INDEX: 36.12 KG/M2 | SYSTOLIC BLOOD PRESSURE: 114 MMHG | DIASTOLIC BLOOD PRESSURE: 83 MMHG | WEIGHT: 252.3 LBS | HEART RATE: 89 BPM

## 2022-05-03 DIAGNOSIS — K75.9 HEPATITIS: ICD-10-CM

## 2022-05-03 DIAGNOSIS — E66.811 CLASS 1 OBESITY WITH SERIOUS COMORBIDITY AND BODY MASS INDEX (BMI) OF 34.0 TO 34.9 IN ADULT, UNSPECIFIED OBESITY TYPE: ICD-10-CM

## 2022-05-03 DIAGNOSIS — I10 HYPERTENSION, UNSPECIFIED TYPE: ICD-10-CM

## 2022-05-03 DIAGNOSIS — E28.2 PCOS (POLYCYSTIC OVARIAN SYNDROME): ICD-10-CM

## 2022-05-03 LAB
FERRITIN SERPL-MCNC: 181 NG/ML (ref 8–252)
HCV AB SERPL QL IA: NONREACTIVE
IGG SERPL-MCNC: 1111 MG/DL (ref 610–1616)
IRON SATN MFR SERPL: 19 % (ref 15–46)
IRON SERPL-MCNC: 71 UG/DL (ref 35–180)
TIBC SERPL-MCNC: 383 UG/DL (ref 240–430)

## 2022-05-03 PROCEDURE — 83550 IRON BINDING TEST: CPT | Performed by: PATHOLOGY

## 2022-05-03 PROCEDURE — 82103 ALPHA-1-ANTITRYPSIN TOTAL: CPT | Mod: 90 | Performed by: PATHOLOGY

## 2022-05-03 PROCEDURE — 82728 ASSAY OF FERRITIN: CPT | Performed by: PATHOLOGY

## 2022-05-03 PROCEDURE — G0463 HOSPITAL OUTPT CLINIC VISIT: HCPCS

## 2022-05-03 PROCEDURE — 86015 ACTIN ANTIBODY EACH: CPT | Mod: 90 | Performed by: PATHOLOGY

## 2022-05-03 PROCEDURE — 99000 SPECIMEN HANDLING OFFICE-LAB: CPT | Performed by: PATHOLOGY

## 2022-05-03 PROCEDURE — 86803 HEPATITIS C AB TEST: CPT | Mod: 90 | Performed by: PATHOLOGY

## 2022-05-03 PROCEDURE — 99204 OFFICE O/P NEW MOD 45 MIN: CPT | Performed by: STUDENT IN AN ORGANIZED HEALTH CARE EDUCATION/TRAINING PROGRAM

## 2022-05-03 PROCEDURE — 81332 SERPINA1 GENE: CPT | Mod: 90 | Performed by: PATHOLOGY

## 2022-05-03 PROCEDURE — 36415 COLL VENOUS BLD VENIPUNCTURE: CPT | Performed by: PATHOLOGY

## 2022-05-03 PROCEDURE — 82784 ASSAY IGA/IGD/IGG/IGM EACH: CPT | Mod: 90 | Performed by: PATHOLOGY

## 2022-05-03 ASSESSMENT — PAIN SCALES - GENERAL: PAINLEVEL: NO PAIN (0)

## 2022-05-03 NOTE — NURSING NOTE
"Chief Complaint   Patient presents with     Consult     Establish care with hepatitis     /83   Pulse 89   Ht 1.778 m (5' 10\")   Wt 114.4 kg (252 lb 4.8 oz)   BMI 36.20 kg/m    Karina Ma CMA on 5/3/2022 at 9:28 AM    "

## 2022-05-03 NOTE — PROGRESS NOTES
Rapid Response Epic Documentation     Situation:  Pt felt faint and almost passed out before blood draw. Staff pt patient in recliner and called RRT.      Objective: Pt had near syncopal in lab       Assessment:    Pt is A&Ox4 on arrival. Pt is moving around, attempting to sit up and appears pink/warm/dry. Pt has no complaints and states she felt nervous after seeing needle.      Mental Status: Alert  CMS: +  Stroke Scale: Negative  EKG: Not Performed      Treatment: No concerns for patient, as she was feeling fine and moving around WNL. Pt remained in recliner for blood draw with lab staff.    Medication: None      Location:          Disposition:      Stable (D/C home)    Protocol Used:     Other Near Syncopal

## 2022-05-03 NOTE — PATIENT INSTRUCTIONS
It was a pleasure taking care of you today.  I've included a brief summary of our discussion and care plan from today's visit below.  Please review this information with your primary care provider.  ______________________________________________________________________     My recommendations are summarized as follows:     - Get your labs done today  - Schedule your ultrasound  - Continue to work in the metabolic weight loss clinic     Return to Hepatology (Liver) Clinic in 12 months to review your progress.    ______________________________________________________________________     How do I schedule labs, imaging studies, or procedures that were ordered in clinic today?      Labs: To schedule lab appointment at the [facility], use my chart or call [phone]. If you have a [facility] lab closer to home where you are regularly seen you can give them a call.     Procedures: If a colonoscopy or upper endoscopy was ordered today, our endoscopy team will call you to schedule this. If you have not heard from our endoscopy team within a week, please call [phone] to schedule.      Imaging Studies: If you were scheduled for a CT scan, X-ray, MRI, ultrasound, or other imaging study, please call [phone] to have this scheduled.      Referral: If a referral to another specialty was ordered, expect a phone call or follow instructions above. If you have not heard from anyone regarding your referral in a week, please call our clinic to check the status.      Who do I call with any questions after my visit?  Please be in touch if there are any further questions that arise following today's visit.  There are multiple ways to contact your hepatology (liver) care team.       During business hours, you may reach a Hepatology nurse at [phone]    To schedule or reschedule an appointment, please call [phone]     You can always send a secure message through Zuldi.  Zuldi messages are answered by  your nurse or doctor typically within 24 hours.  Please allow extra time on weekends and holidays.       How will I get the results of any tests ordered?    You will receive all of your results.  If you have signed up for AngelPrimehart, any tests ordered at your visit will be available to you after your provider reviews them.  If you are not signed up for MyChart, you will receive a letter with the results. Typically this takes 1-2 weeks.  If there are urgent results that require a change in your care plan, your provider or nurse will call you to discuss the next steps.       What is Windcentralet?  Key Ring is a secure way for you to access all of your healthcare records from the [facility].  It is a web based computer program, so you can sign on to it from any location.  It also allows you to send secure messages to your care team.  I recommend signing up for Key Ring access if you have not already done so and are comfortable with using a computer.       How to I schedule a follow-up visit?  If you did not schedule a follow-up visit today, please call [phone] to schedule a follow-up office visit.       Sincerely,     Tsering Perez MD  Hepatology  Division of Gastroenterology, Hepatology & Nutrition  [facility]

## 2022-05-03 NOTE — LETTER
5/3/2022         RE: Holli Saxena  [address]  [place] [address]        Dear Colleague,    Thank you for referring your patient, Holli Saxena, to the [facility] [place]. Please see a copy of my visit note below.    [facility] New Patient Visit    Date of Visit: May 3, 2022    Reason for referral: elevated LFTs    Subjective: Ms. Saxena is a 46-year-old woman with a history of obesity, hypertension, hyperlipidemia, PCOS who presents for evaluation of elevated AST and ALT.    No previous known liver disease, abnormal LFTs, imaging tests. No known history of liver decompensation.    Currently weights 252 lbs, was 267 in the past.  She follows in the metabolic weight loss clinic, has lost weight with this and with addition of Ozempic.    No alcohol use, last drink 3 years ago.     ROS: 14 point ROS negative except for positives noted in HPI.    PMHx:  Past Medical History:   Diagnosis Date     Hx of previous reproductive problem      Hyperlipidemia      Obesity, unspecified      PCOS (polycystic ovarian syndrome)      Unspecified essential hypertension        PSHx:  Past Surgical History:   Procedure Laterality Date      SECTION N/A 2015    Procedure:  SECTION;  Surgeon: Meena Arnold MD;  Location: [facility] L+D     NO HISTORY OF SURGERY         FamHx:  Family History   Problem Relation Age of Onset     Diabetes Mother         type 2     Cardiovascular Mother      Obesity Mother      Diabetes Father         type 2     Obesity Father      Hypertension Father      Hypertension Brother      C.A.D. Brother         mild MI     Gynecology Sister         PCOS     Cerebrovascular Disease Paternal Grandfather      Cardiovascular Paternal Grandfather      Alzheimer Disease Paternal Grandmother      Obesity Brother      Obesity Sister      No family history of liver disease, liver cancer    SocHx:  Social History     Socioeconomic  History     Marital status:      Spouse name: Not on file     Number of children: Not on file     Years of education: Not on file     Highest education level: Not on file   Occupational History     Occupation:      Employer: [employer]   Tobacco Use     Smoking status: Never Smoker     Smokeless tobacco: Never Used   Substance and Sexual Activity     Alcohol use: No     Drug use: No     Sexual activity: Yes     Partners: Male     Birth control/protection: Pill   Other Topics Concern     Parent/sibling w/ CABG, MI or angioplasty before 65F 55M? Not Asked   Social History Narrative    How much exercise per week? walking    How much calcium per day? One glass milk per day, vitamin       How much caffeine per day? 3 cups per week    How much vitamin D per day? Multi-vitamin    Do you/your family wear seatbelts?  Yes    Do you/your family use safety helmets? No activities requiring use    Do you/your family use sunscreen? Yes    Do you/your family keep firearms in the home? Yes    Do you/your family have a smoke detector(s)? Yes        Do you feel safe in your home? Yes    Has anyone ever touched you in an unwanted manner? No     Explain     MEHNAZ Gleason June 24, 2014 11:44 AM                     Social Determinants of Health     Financial Resource Strain: Not on file   Food Insecurity: Not on file   Transportation Needs: Not on file   Physical Activity: Not on file   Stress: Not on file   Social Connections: Not on file   Intimate Partner Violence: Not on file   Housing Stability: Not on file       Medications:  Current Outpatient Medications   Medication     Flaxseed, Linseed, (FLAXSEED OIL PO)     lisinopril (ZESTRIL) 20 MG tablet     Multiple Vitamin (MULTIVITAMINS PO)     NIFEdipine ER OSMOTIC (PROCARDIA XL) 30 MG 24 hr tablet     OZEMPIC, 1 MG/DOSE, 4 MG/3ML SOPN     phentermine (ADIPEX-P) 37.5 MG tablet     semaglutide (OZEMPIC) 2 MG/1.5ML SOPN pen     No current facility-administered  "medications for this visit.     No OTCs, herbals    Allergies:  No Known Allergies    Objective:  /83   Pulse 89   Ht 1.778 m (5' 10\")   Wt 114.4 kg (252 lb 4.8 oz)   BMI 36.20 kg/m    Constitutional: pleasant woman in NAD  Eyes: non icteric  Respiratory: Normal respiratory excursion   MSK: normal range of motion of visualized extremities  Abd: Non distended  Skin: No jaundice  Psychiatric: normal mood and orientation    Labs:  Last Comprehensive Metabolic Panel:  Sodium   Date Value Ref Range Status   03/21/2022 135 133 - 144 mmol/L Final   10/07/2020 136 133 - 144 mmol/L Final     Potassium   Date Value Ref Range Status   03/21/2022 5.0 3.4 - 5.3 mmol/L Final   10/07/2020 4.4 3.4 - 5.3 mmol/L Final     Chloride   Date Value Ref Range Status   03/21/2022 105 94 - 109 mmol/L Final   10/07/2020 105 94 - 109 mmol/L Final     Carbon Dioxide   Date Value Ref Range Status   10/07/2020 24 20 - 32 mmol/L Final     Carbon Dioxide (CO2)   Date Value Ref Range Status   03/21/2022 22 20 - 32 mmol/L Final     Anion Gap   Date Value Ref Range Status   03/21/2022 8 3 - 14 mmol/L Final   10/07/2020 7 3 - 14 mmol/L Final     Glucose   Date Value Ref Range Status   03/21/2022 129 (H) 70 - 99 mg/dL Final   10/07/2020 84 70 - 99 mg/dL Final     Urea Nitrogen   Date Value Ref Range Status   03/21/2022 9 7 - 30 mg/dL Final   10/07/2020 10 7 - 30 mg/dL Final     Creatinine   Date Value Ref Range Status   03/21/2022 0.72 0.52 - 1.04 mg/dL Final   10/07/2020 0.73 0.52 - 1.04 mg/dL Final     GFR Estimate   Date Value Ref Range Status   03/21/2022 >90 >60 mL/min/1.73m2 Final     Comment:     Effective December 21, 2021 eGFRcr in adults is calculated using the 2021 CKD-EPI creatinine equation which includes age and gender (Leatha fernandez al., NEJM, DOI: 10.1056/NFBVcu9224081)   10/07/2020 >90 >60 mL/min/[1.73_m2] Final     Comment:     Non  GFR Calc  Starting 12/18/2018, serum creatinine based estimated GFR (eGFR) will be "   calculated using the Chronic Kidney Disease Epidemiology Collaboration   (CKD-EPI) equation.       Calcium   Date Value Ref Range Status   03/21/2022 9.9 8.5 - 10.1 mg/dL Final   10/07/2020 9.3 8.5 - 10.1 mg/dL Final     Bilirubin Total   Date Value Ref Range Status   03/21/2022 0.5 0.2 - 1.3 mg/dL Final   12/31/2014 0.2 0.2 - 1.3 mg/dL Final     Alkaline Phosphatase   Date Value Ref Range Status   03/21/2022 90 40 - 150 U/L Final   12/31/2014 109 40 - 150 U/L Final     ALT   Date Value Ref Range Status   03/21/2022 78 (H) 0 - 50 U/L Final   01/30/2015 17 0 - 50 U/L Final     AST   Date Value Ref Range Status   03/21/2022 53 (H) 0 - 45 U/L Final   01/30/2015 42 0 - 45 U/L Final       Lab Results   Component Value Date    WBC 8.3 10/11/2017     Lab Results   Component Value Date    RBC 5.05 10/11/2017     Lab Results   Component Value Date    HGB 15.6 10/11/2017     Lab Results   Component Value Date    HCT 45.3 10/11/2017     Lab Results   Component Value Date    MCV 90 10/11/2017     Lab Results   Component Value Date    MCH 30.9 10/11/2017     Lab Results   Component Value Date    MCHC 34.4 10/11/2017     Lab Results   Component Value Date    RDW 12.1 10/11/2017     Lab Results   Component Value Date     10/11/2017     March 21, 2022  AST 53  ALT 78    February 5, 2022  AST 81   yes follow-up falls going in and out    2015 liver enzymes normal    Imaging: No abdominal imaging had    Independently reviewed labs and imaging.     Assessment/Plan: Ms. Saxena is a 46-year-old woman with a history of obesity, hypertension, hyperlipidemia, PCOS who presents for evaluation of elevated AST and ALT.    She does not drink alcohol.  Her elevated liver enzymes are likely secondary to nonalcoholic fatty liver disease.  She did have hepatitis B testing in the past when she delivered that was negative. Will rule out other causes of elevated LFTs.     Recommend US with elastography to assess liver and for  "scarring.     Discussed the natural history of NAFLD, overtime steatosis and inflammation can lead to scarring and cirrhosis. Treatment is 5-10% weight loss with diet and exercise to reduce inflammation and scarring. Increasing physical activity, outside of weight loss, is also beneficial in NAFLD. For patients with NAFLD and DM, insulin sensitizing agents (GLP-1 receptor agonists, liraglutide/\"Victoza\", semaglutide/\"Ozempic\" subcutaneous or \"Rybelsus\" PO) can be considered for dual benefit of DM and WALSH. She is on ozempic already for weight loss. Statins are not contraindicated in WALSH, it should be used if indicated given the high risk of cardiovascular disease in this population.     Orders Placed This Encounter   Procedures     US Elastography with Abdomen Limited     Hepatitis C antibody     Alpha 1 antitryp abimael reflex to pheno     Ferritin     Iron and iron binding capacity     F Actin EIA with reflex     IgG       RTC 12 months, will reschedule to 6 months if she has scarring on her US elastography    Tsering Perez MD MS  Hepatology/Liver Transplant  [facility]          Again, thank you for allowing me to participate in the care of your patient.        Sincerely,        Tsering Perez MD    "

## 2022-05-03 NOTE — PROGRESS NOTES
[facility] New Patient Visit    Date of Visit: May 3, 2022    Reason for referral: elevated LFTs    Subjective: Ms. Saxena is a 46-year-old woman with a history of obesity, hypertension, hyperlipidemia, PCOS who presents for evaluation of elevated AST and ALT.    No previous known liver disease, abnormal LFTs, imaging tests. No known history of liver decompensation.    Currently weights 252 lbs, was 267 in the past.  She follows in the metabolic weight loss clinic, has lost weight with this and with addition of Ozempic.    No alcohol use, last drink 3 years ago.     ROS: 14 point ROS negative except for positives noted in HPI.    PMHx:  Past Medical History:   Diagnosis Date     Hx of previous reproductive problem      Hyperlipidemia      Obesity, unspecified      PCOS (polycystic ovarian syndrome)      Unspecified essential hypertension        PSHx:  Past Surgical History:   Procedure Laterality Date      SECTION N/A 2015    Procedure:  SECTION;  Surgeon: Meena Arnold MD;  Location:  L+D     NO HISTORY OF SURGERY         FamHx:  Family History   Problem Relation Age of Onset     Diabetes Mother         type 2     Cardiovascular Mother      Obesity Mother      Diabetes Father         type 2     Obesity Father      Hypertension Father      Hypertension Brother      C.A.D. Brother         mild MI     Gynecology Sister         PCOS     Cerebrovascular Disease Paternal Grandfather      Cardiovascular Paternal Grandfather      Alzheimer Disease Paternal Grandmother      Obesity Brother      Obesity Sister      No family history of liver disease, liver cancer    SocHx:  Social History     Socioeconomic History     Marital status:      Spouse name: Not on file     Number of children: Not on file     Years of education: Not on file     Highest education level: Not on file   Occupational History     Occupation:      Employer: [employer]  "  Tobacco Use     Smoking status: Never Smoker     Smokeless tobacco: Never Used   Substance and Sexual Activity     Alcohol use: No     Drug use: No     Sexual activity: Yes     Partners: Male     Birth control/protection: Pill   Other Topics Concern     Parent/sibling w/ CABG, MI or angioplasty before 65F 55M? Not Asked   Social History Narrative    How much exercise per week? walking    How much calcium per day? One glass milk per day, vitamin       How much caffeine per day? 3 cups per week    How much vitamin D per day? Multi-vitamin    Do you/your family wear seatbelts?  Yes    Do you/your family use safety helmets? No activities requiring use    Do you/your family use sunscreen? Yes    Do you/your family keep firearms in the home? Yes    Do you/your family have a smoke detector(s)? Yes        Do you feel safe in your home? Yes    Has anyone ever touched you in an unwanted manner? No     Explain     MEHNAZ Gleason June 24, 2014 11:44 AM                     Social Determinants of Health     Financial Resource Strain: Not on file   Food Insecurity: Not on file   Transportation Needs: Not on file   Physical Activity: Not on file   Stress: Not on file   Social Connections: Not on file   Intimate Partner Violence: Not on file   Housing Stability: Not on file       Medications:  Current Outpatient Medications   Medication     Flaxseed, Linseed, (FLAXSEED OIL PO)     lisinopril (ZESTRIL) 20 MG tablet     Multiple Vitamin (MULTIVITAMINS PO)     NIFEdipine ER OSMOTIC (PROCARDIA XL) 30 MG 24 hr tablet     OZEMPIC, 1 MG/DOSE, 4 MG/3ML SOPN     phentermine (ADIPEX-P) 37.5 MG tablet     semaglutide (OZEMPIC) 2 MG/1.5ML SOPN pen     No current facility-administered medications for this visit.     No OTCs, herbals    Allergies:  No Known Allergies    Objective:  /83   Pulse 89   Ht 1.778 m (5' 10\")   Wt 114.4 kg (252 lb 4.8 oz)   BMI 36.20 kg/m    Constitutional: pleasant woman in NAD  Eyes: non icteric  Respiratory: " Normal respiratory excursion   MSK: normal range of motion of visualized extremities  Abd: Non distended  Skin: No jaundice  Psychiatric: normal mood and orientation    Labs:  Last Comprehensive Metabolic Panel:  Sodium   Date Value Ref Range Status   03/21/2022 135 133 - 144 mmol/L Final   10/07/2020 136 133 - 144 mmol/L Final     Potassium   Date Value Ref Range Status   03/21/2022 5.0 3.4 - 5.3 mmol/L Final   10/07/2020 4.4 3.4 - 5.3 mmol/L Final     Chloride   Date Value Ref Range Status   03/21/2022 105 94 - 109 mmol/L Final   10/07/2020 105 94 - 109 mmol/L Final     Carbon Dioxide   Date Value Ref Range Status   10/07/2020 24 20 - 32 mmol/L Final     Carbon Dioxide (CO2)   Date Value Ref Range Status   03/21/2022 22 20 - 32 mmol/L Final     Anion Gap   Date Value Ref Range Status   03/21/2022 8 3 - 14 mmol/L Final   10/07/2020 7 3 - 14 mmol/L Final     Glucose   Date Value Ref Range Status   03/21/2022 129 (H) 70 - 99 mg/dL Final   10/07/2020 84 70 - 99 mg/dL Final     Urea Nitrogen   Date Value Ref Range Status   03/21/2022 9 7 - 30 mg/dL Final   10/07/2020 10 7 - 30 mg/dL Final     Creatinine   Date Value Ref Range Status   03/21/2022 0.72 0.52 - 1.04 mg/dL Final   10/07/2020 0.73 0.52 - 1.04 mg/dL Final     GFR Estimate   Date Value Ref Range Status   03/21/2022 >90 >60 mL/min/1.73m2 Final     Comment:     Effective December 21, 2021 eGFRcr in adults is calculated using the 2021 CKD-EPI creatinine equation which includes age and gender (Leatha fernandez al., NEJM, DOI: 10.1056/LEKMdc3200076)   10/07/2020 >90 >60 mL/min/[1.73_m2] Final     Comment:     Non  GFR Calc  Starting 12/18/2018, serum creatinine based estimated GFR (eGFR) will be   calculated using the Chronic Kidney Disease Epidemiology Collaboration   (CKD-EPI) equation.       Calcium   Date Value Ref Range Status   03/21/2022 9.9 8.5 - 10.1 mg/dL Final   10/07/2020 9.3 8.5 - 10.1 mg/dL Final     Bilirubin Total   Date Value Ref Range  Status   03/21/2022 0.5 0.2 - 1.3 mg/dL Final   12/31/2014 0.2 0.2 - 1.3 mg/dL Final     Alkaline Phosphatase   Date Value Ref Range Status   03/21/2022 90 40 - 150 U/L Final   12/31/2014 109 40 - 150 U/L Final     ALT   Date Value Ref Range Status   03/21/2022 78 (H) 0 - 50 U/L Final   01/30/2015 17 0 - 50 U/L Final     AST   Date Value Ref Range Status   03/21/2022 53 (H) 0 - 45 U/L Final   01/30/2015 42 0 - 45 U/L Final       Lab Results   Component Value Date    WBC 8.3 10/11/2017     Lab Results   Component Value Date    RBC 5.05 10/11/2017     Lab Results   Component Value Date    HGB 15.6 10/11/2017     Lab Results   Component Value Date    HCT 45.3 10/11/2017     Lab Results   Component Value Date    MCV 90 10/11/2017     Lab Results   Component Value Date    MCH 30.9 10/11/2017     Lab Results   Component Value Date    MCHC 34.4 10/11/2017     Lab Results   Component Value Date    RDW 12.1 10/11/2017     Lab Results   Component Value Date     10/11/2017     March 21, 2022  AST 53  ALT 78    February 5, 2022  AST 81   yes follow-up falls going in and out    2015 liver enzymes normal    Imaging: No abdominal imaging had    Independently reviewed labs and imaging.     Assessment/Plan: Ms. Saxena is a 46-year-old woman with a history of obesity, hypertension, hyperlipidemia, PCOS who presents for evaluation of elevated AST and ALT.    She does not drink alcohol.  Her elevated liver enzymes are likely secondary to nonalcoholic fatty liver disease.  She did have hepatitis B testing in the past when she delivered that was negative. Will rule out other causes of elevated LFTs.     Recommend US with elastography to assess liver and for scarring.     Discussed the natural history of NAFLD, overtime steatosis and inflammation can lead to scarring and cirrhosis. Treatment is 5-10% weight loss with diet and exercise to reduce inflammation and scarring. Increasing physical activity, outside of weight  "loss, is also beneficial in NAFLD. For patients with NAFLD and DM, insulin sensitizing agents (GLP-1 receptor agonists, liraglutide/\"Victoza\", semaglutide/\"Ozempic\" subcutaneous or \"Rybelsus\" PO) can be considered for dual benefit of DM and WALSH. She is on ozempic already for weight loss. Statins are not contraindicated in WALSH, it should be used if indicated given the high risk of cardiovascular disease in this population.     Orders Placed This Encounter   Procedures     US Elastography with Abdomen Limited     Hepatitis C antibody     Alpha 1 antitryp abimael reflex to pheno     Ferritin     Iron and iron binding capacity     F Actin EIA with reflex     IgG       RTC 12 months, will reschedule to 6 months if she has scarring on her US elastography    Tsering Perez MD MS  Hepatology/Liver Transplant  [facility]      "

## 2022-05-05 LAB — SMA IGG SER-ACNC: 11 UNITS

## 2022-05-08 ENCOUNTER — HEALTH MAINTENANCE LETTER (OUTPATIENT)
Age: 47
End: 2022-05-08

## 2022-05-10 LAB
A1AT PHENOTYP SERPL-IMP: ABNORMAL
A1AT SERPL-MCNC: 138 MG/DL
A1AT SS SERPL-MCNC: ABNORMAL G/L
A1AT SZ SERPL-MCNC: ABNORMAL G/L
A1AT ZZ SERPL-MCNC: NEGATIVE G/L
SPECIMEN SOURCE: ABNORMAL

## 2022-05-11 ENCOUNTER — ANCILLARY PROCEDURE (OUTPATIENT)
Dept: ULTRASOUND IMAGING | Facility: CLINIC | Age: 47
End: 2022-05-11
Attending: STUDENT IN AN ORGANIZED HEALTH CARE EDUCATION/TRAINING PROGRAM
Payer: COMMERCIAL

## 2022-05-11 DIAGNOSIS — K75.9 HEPATITIS: ICD-10-CM

## 2022-05-11 PROCEDURE — 76981 USE PARENCHYMA: CPT | Mod: GC | Performed by: RADIOLOGY

## 2022-05-13 ENCOUNTER — TELEPHONE (OUTPATIENT)
Dept: GASTROENTEROLOGY | Facility: CLINIC | Age: 47
End: 2022-05-13
Payer: COMMERCIAL

## 2022-06-05 DIAGNOSIS — E66.01 MORBID OBESITY (H): ICD-10-CM

## 2022-06-07 ASSESSMENT — ENCOUNTER SYMPTOMS
JOINT SWELLING: 0
HEMATOCHEZIA: 0
SORE THROAT: 0
SHORTNESS OF BREATH: 0
CONSTIPATION: 0
ARTHRALGIAS: 0
FREQUENCY: 0
MYALGIAS: 0
HEADACHES: 0
BREAST MASS: 0
HEMATURIA: 0
NERVOUS/ANXIOUS: 1
PALPITATIONS: 0
EYE PAIN: 0
NAUSEA: 0
DIZZINESS: 0
CHILLS: 0
PARESTHESIAS: 0
WEAKNESS: 0
DYSURIA: 0
HEARTBURN: 1
DIARRHEA: 0
ABDOMINAL PAIN: 0
FEVER: 0
COUGH: 0

## 2022-06-08 ENCOUNTER — OFFICE VISIT (OUTPATIENT)
Dept: INTERNAL MEDICINE | Facility: CLINIC | Age: 47
End: 2022-06-08
Payer: COMMERCIAL

## 2022-06-08 ENCOUNTER — TRANSFERRED RECORDS (OUTPATIENT)
Dept: INTERNAL MEDICINE | Facility: CLINIC | Age: 47
End: 2022-06-08

## 2022-06-08 VITALS
BODY MASS INDEX: 35.85 KG/M2 | OXYGEN SATURATION: 99 % | TEMPERATURE: 97.7 F | RESPIRATION RATE: 18 BRPM | DIASTOLIC BLOOD PRESSURE: 75 MMHG | SYSTOLIC BLOOD PRESSURE: 115 MMHG | WEIGHT: 250.4 LBS | HEART RATE: 89 BPM | HEIGHT: 70 IN

## 2022-06-08 DIAGNOSIS — Z12.11 SPECIAL SCREENING FOR MALIGNANT NEOPLASMS, COLON: ICD-10-CM

## 2022-06-08 DIAGNOSIS — E66.01 MORBID OBESITY (H): ICD-10-CM

## 2022-06-08 DIAGNOSIS — E28.2 PCOS (POLYCYSTIC OVARIAN SYNDROME): ICD-10-CM

## 2022-06-08 DIAGNOSIS — Z12.31 ENCOUNTER FOR SCREENING MAMMOGRAM FOR BREAST CANCER: ICD-10-CM

## 2022-06-08 DIAGNOSIS — F41.0 PANIC ATTACK: ICD-10-CM

## 2022-06-08 DIAGNOSIS — Z00.00 ROUTINE GENERAL MEDICAL EXAMINATION AT A HEALTH CARE FACILITY: Primary | ICD-10-CM

## 2022-06-08 DIAGNOSIS — K75.81 NASH (NONALCOHOLIC STEATOHEPATITIS): ICD-10-CM

## 2022-06-08 DIAGNOSIS — I10 HTN, GOAL BELOW 140/90: ICD-10-CM

## 2022-06-08 DIAGNOSIS — E78.5 HYPERLIPIDEMIA LDL GOAL <130: ICD-10-CM

## 2022-06-08 PROCEDURE — 99214 OFFICE O/P EST MOD 30 MIN: CPT | Mod: 25 | Performed by: INTERNAL MEDICINE

## 2022-06-08 PROCEDURE — 99386 PREV VISIT NEW AGE 40-64: CPT | Performed by: INTERNAL MEDICINE

## 2022-06-08 RX ORDER — LORAZEPAM 0.5 MG/1
0.5 TABLET ORAL 2 TIMES DAILY PRN
Qty: 20 TABLET | Refills: 0 | Status: SHIPPED | OUTPATIENT
Start: 2022-06-08

## 2022-06-08 RX ORDER — SEMAGLUTIDE 1.34 MG/ML
INJECTION, SOLUTION SUBCUTANEOUS
Qty: 3 ML | Refills: 0 | Status: SHIPPED | OUTPATIENT
Start: 2022-06-08 | End: 2022-07-07

## 2022-06-08 RX ORDER — NIFEDIPINE 30 MG/1
30 TABLET, EXTENDED RELEASE ORAL DAILY
Qty: 90 TABLET | Refills: 1 | Status: SHIPPED | OUTPATIENT
Start: 2022-06-08 | End: 2023-02-27

## 2022-06-08 RX ORDER — LISINOPRIL 20 MG/1
20 TABLET ORAL DAILY
Qty: 90 TABLET | Refills: 1 | Status: SHIPPED | OUTPATIENT
Start: 2022-06-08 | End: 2023-02-16

## 2022-06-08 ASSESSMENT — ENCOUNTER SYMPTOMS
NERVOUS/ANXIOUS: 1
WEAKNESS: 0
HEMATURIA: 0
FREQUENCY: 0
DYSURIA: 0
HEARTBURN: 1
SORE THROAT: 0
SHORTNESS OF BREATH: 0
EYE PAIN: 0
FEVER: 0
PARESTHESIAS: 0
ARTHRALGIAS: 0
DIARRHEA: 0
HEADACHES: 0
CHILLS: 0
NAUSEA: 0
DIZZINESS: 0
CONSTIPATION: 0
BREAST MASS: 0
ABDOMINAL PAIN: 0
HEMATOCHEZIA: 0
JOINT SWELLING: 0
MYALGIAS: 0
COUGH: 0
PALPITATIONS: 0

## 2022-06-08 NOTE — TELEPHONE ENCOUNTER
LCV:3/8/2022  [facility] [place]  NCV: 6-17-22  Creatinine   Date Value Ref Range Status   03/21/2022 0.72 0.52 - 1.04 mg/dL Final   10/07/2020 0.73 0.52 - 1.04 mg/dL Final

## 2022-06-08 NOTE — PATIENT INSTRUCTIONS
Plan:  1. Decrease Lisinopril to 10 mg daily as long as the blood pressure is < 130  2. Continue Nifedipine same dose  3. Mammogram ( please call [phone] to schedule it)   4. Colonoscopy   5. Next time you do labs ordered by dr Perez, do TSH ordered by dr Whatley   6. Lorazepam 0.5 mg twice a day as needed for panic attack. 20 tabs should last at least 6 months   7. Schedule in office appointment July 20 for pap and f/u BP ( 7:30 ok)  8. Ask dr Perez if you benefit from Hep A/B vaccine

## 2022-06-08 NOTE — PROGRESS NOTES
Dr Whatley's note    Patient's instructions / PLAN:                                                        Plan:  1. Decrease Lisinopril to 10 mg daily as long as the blood pressure is < 130  2. Continue Nifedipine same dose  3. Mammogram ( please call [phone] to schedule it)   4. Colonoscopy   5. Next time you do labs ordered by dr Perez, do TSH ordered by dr Whatley   6. Lorazepam 0.5 mg twice a day as needed for panic attack. 20 tabs should last at least 6 months   7. Schedule in office appointment July 20 for pap and f/u BP ( 7:30 ok)  8. Ask dr Perez if you benefit from Hep A/B vaccine           ASSESSMENT & PLAN:                                                      (Z00.00) Routine general medical examination at a health care facility  (primary encounter diagnosis)  Comment:   Plan:     (I10) HTN, goal below 140/90  Comment: Controlled    Plan: as above     (E78.5) Hyperlipidemia LDL goal <130  Comment: stable   Plan: TSH with free T4 reflex            (K75.81) WALSH (nonalcoholic steatohepatitis)  Comment:   Plan: f/u w GI    (E66.01) Morbid obesity (H)  Comment:   Plan: lisinopril (ZESTRIL) 20 MG tablet        F/u w wt clinic     (E28.2) PCOS (polycystic ovarian syndrome)  Comment:   Plan: lisinopril (ZESTRIL) 20 MG tablet, NIFEdipine         ER OSMOTIC (PROCARDIA XL) 30 MG 24 hr tablet            (F41.0) Panic attack  Comment:   We discussed about the new meds, advantages and potential side effects. The patient will read also the info from the pharmacy and call back if questions.   Plan: LORazepam (ATIVAN) 0.5 MG tablet            (Z12.31) Encounter for screening mammogram for breast cancer  Comment:   Plan: MA Screening Digital Bilateral            (Z12.11) Special screening for malignant neoplasms, colon  Comment:   Plan: Adult Gastro Ref - Procedure Only               Chief Complaint:                                                        Annual exam  Follow up chronic medical problems       SUBJECTIVE:                                                    History of present illness     We reviewed the chronic medical problems as above.   I reviewed the recent tests results in Epic       WALSH -- dt Ana, GI    Panic attack once a month, related w stress at work      Period today     ROS:     See below       PMHx: - reviewed  Past Medical History:   Diagnosis Date     Hx of previous reproductive problem      Hyperlipidemia      Obesity, unspecified      PCOS (polycystic ovarian syndrome)      Unspecified essential hypertension        PSHx: reviewed  Past Surgical History:   Procedure Laterality Date      SECTION N/A 2015    Procedure:  SECTION;  Surgeon: Meena Arnold MD;  Location:  [facility]     NO HISTORY OF SURGERY          Soc Hx: No daily alcohol, no smoking  Social History     Socioeconomic History     Marital status:      Spouse name: Not on file     Number of children: Not on file     Years of education: Not on file     Highest education level: Not on file   Occupational History     Occupation:      Employer: [employer]   Tobacco Use     Smoking status: Never Smoker     Smokeless tobacco: Never Used   Vaping Use     Vaping Use: Never used   Substance and Sexual Activity     Alcohol use: No     Drug use: No     Sexual activity: Yes     Partners: Male     Birth control/protection: Pill   Other Topics Concern     Parent/sibling w/ CABG, MI or angioplasty before 65F 55M? Not Asked   Social History Narrative    How much exercise per week? walking    How much calcium per day? One glass milk per day, vitamin       How much caffeine per day? 3 cups per week    How much vitamin D per day? Multi-vitamin    Do you/your family wear seatbelts?  Yes    Do you/your family use safety helmets? No activities requiring use    Do you/your family use sunscreen? Yes    Do you/your family keep firearms in the home? Yes    Do you/your family have a smoke detector(s)?  "Yes        Do you feel safe in your home? Yes    Has anyone ever touched you in an unwanted manner? No     Explain     MEHNAZ Gleason June 24, 2014 11:44 AM                     Social Determinants of Health     Financial Resource Strain: Not on file   Food Insecurity: Not on file   Transportation Needs: Not on file   Physical Activity: Not on file   Stress: Not on file   Social Connections: Not on file   Intimate Partner Violence: Not on file   Housing Stability: Not on file        Fam Hx: reviewed  Family History   Problem Relation Age of Onset     Diabetes Mother         type 2     Cardiovascular Mother      Obesity Mother      Diabetes Father         type 2     Obesity Father      Hypertension Father      Hypertension Brother      C.A.D. Brother         mild MI     Gynecology Sister         PCOS     Cerebrovascular Disease Paternal Grandfather      Cardiovascular Paternal Grandfather      Alzheimer Disease Paternal Grandmother      Obesity Brother      Obesity Sister          Screening: reviewed      All: reviewed    Meds: reviewed  Current Outpatient Medications   Medication Sig Dispense Refill     Flaxseed, Linseed, (FLAXSEED OIL PO) Take as directed       lisinopril (ZESTRIL) 20 MG tablet TAKE 1 TABLET BY MOUTH EVERY DAY 90 tablet 3     Multiple Vitamin (MULTIVITAMINS PO) Take 1 tablet by mouth daily.       NIFEdipine ER OSMOTIC (PROCARDIA XL) 30 MG 24 hr tablet Take 1 tablet (30 mg) by mouth daily 90 tablet 1     OZEMPIC, 1 MG/DOSE, 4 MG/3ML SOPN INJECT 1MG SUBCUTANEOUSLY ONCE A WEEK 3 mL 1     phentermine (ADIPEX-P) 37.5 MG tablet 1/2 tab in am. 15 tablet 3       OBJECTIVE:                                                    Physical Exam :  Blood pressure 115/75, pulse 89, temperature 97.7  F (36.5  C), temperature source Tympanic, resp. rate 18, height 1.778 m (5' 10\"), weight 113.6 kg (250 lb 6.4 oz), SpO2 99 %, not currently breastfeeding.     NAD, appears comfortable  Skin clear, no rashes  Neck: supple, no " JVD,  no thyroidmegaly  Lymph nodes non palpable in the cervical, supraclavicular axillaries,   Chest: clear to auscultation with good respiratory effort  Cardiac: S1S2, RRR, no mgr appreciated  Abdomen: soft, not tender, not distended, audible bowel sound, no hepatosplenomegaly, no palpable masses, no abdominal bruits  Extremities: no cyanosis, clubbing or edema.   Neuro: A, Ox3, no focal signs.  Breast exam in supine and erect position: they are symmetrical, no skin changes, no tenderness or nodes on palpation. Nipples are erect, no skin lesions, no discharge on pressure.    Pelvic exam: deferred, period        Freya Whatley MD  Internal Medicine            SUBJECTIVE:   CC: Holli Saxena is an 47 year old woman who presents for preventive health visit.       Patient has been advised of split billing requirements and indicates understanding: Yes  Healthy Habits:     Getting at least 3 servings of Calcium per day:  Yes    Bi-annual eye exam:  NO    Dental care twice a year:  Yes    Sleep apnea or symptoms of sleep apnea:  None    Diet:  Breakfast skipped    Frequency of exercise:  2-3 days/week    Duration of exercise:  15-30 minutes    Taking medications regularly:  Yes    Medication side effects:  None    PHQ-2 Total Score: 0    Additional concerns today:  No          Hypertension Follow-up      Do you check your blood pressure regularly outside of the clinic? Yes     Are you following a low salt diet? No    Are your blood pressures ever more than 140 on the top number (systolic) OR more   than 90 on the bottom number (diastolic), for example 140/90? No      Today's PHQ-2 Score:   PHQ-2 ( 1999 Pf[employer]) 6/7/2022   Q1: Little interest or pleasure in doing things 0   Q2: Feeling down, depressed or hopeless 0   PHQ-2 Score 0   PHQ-2 Total Score (12-17 Years)- Positive if 3 or more points; Administer PHQ-A if positive -   Q1: Little interest or pleasure in doing things Not at all   Q2: Feeling down, depressed or  hopeless Not at all   PHQ-2 Score 0       Abuse: Current or Past (Physical, Sexual or Emotional) - No  Do you feel safe in your environment? Yes    Have you ever done Advance Care Planning? (For example, a Health Directive, POLST, or a discussion with a medical provider or your loved ones about your wishes): No, advance care planning information given to patient to review.  Patient declined advance care planning discussion at this time.    Social History     Tobacco Use     Smoking status: Never Smoker     Smokeless tobacco: Never Used   Substance Use Topics     Alcohol use: No         Alcohol Use 6/7/2022   Prescreen: >3 drinks/day or >7 drinks/week? Not Applicable   Prescreen: >3 drinks/day or >7 drinks/week? -       Reviewed orders with patient.  Reviewed health maintenance and updated orders accordingly - Yes  Labs reviewed in EPIC    Breast Cancer Screening:    Breast CA Risk Assessment (FHS-7) 6/7/2022   Do you have a family history of breast, colon, or ovarian cancer? No / Unknown           Pertinent mammograms are reviewed under the imaging tab.    History of abnormal Pap smear:   PAP / HPV 10/28/2013 3/25/2010   PAP (Historical) NIL NIL     Reviewed and updated as needed this visit by clinical staff                    Reviewed and updated as needed this visit by Provider                       Review of Systems   Constitutional: Negative for chills and fever.   HENT: Negative for congestion, ear pain, hearing loss and sore throat.    Eyes: Negative for pain and visual disturbance.   Respiratory: Negative for cough and shortness of breath.    Cardiovascular: Negative for chest pain, palpitations and peripheral edema.   Gastrointestinal: Positive for heartburn. Negative for abdominal pain, constipation, diarrhea, hematochezia and nausea.   Breasts:  Negative for tenderness, breast mass and discharge.   Genitourinary: Negative for dysuria, frequency, genital sores, hematuria, pelvic pain, urgency, vaginal  "bleeding and vaginal discharge.   Musculoskeletal: Negative for arthralgias, joint swelling and myalgias.   Skin: Negative for rash.   Neurological: Negative for dizziness, weakness, headaches and paresthesias.   Psychiatric/Behavioral: Negative for mood changes. The patient is nervous/anxious.      Patient has been advised of split billing requirements and indicates understanding: Yes At the check in, at the      COUNSELING:  Reviewed preventive health counseling, as reflected in patient instructions       Regular exercise       Healthy diet/nutrition    Estimated body mass index is 36.2 kg/m  as calculated from the following:    Height as of 5/3/22: 1.778 m (5' 10\").    Weight as of 5/3/22: 114.4 kg (252 lb 4.8 oz).    Weight management plan: Discussed healthy diet and exercise guidelines    She reports that she has never smoked. She has never used smokeless tobacco.      Counseling Resources:  ATP IV Guidelines  Pooled Cohorts Equation Calculator  Breast Cancer Risk Calculator  BRCA-Related Cancer Risk Assessment: FHS-7 Tool  FRAX Risk Assessment  ICSI Preventive Guidelines  Dietary Guidelines for Americans, 2010  USDA's MyPlate  ASA Prophylaxis  Lung CA Screening    Freya Scales MD  [facility]  " Dermal Autograft Text: The defect edges were debeveled with a #15 scalpel blade.  Given the location of the defect, shape of the defect and the proximity to free margins a dermal autograft was deemed most appropriate.  Using a sterile surgical marker, the primary defect shape was transferred to the donor site. The area thus outlined was incised deep to adipose tissue with a #15 scalpel blade.  The harvested graft was then trimmed of adipose and epidermal tissue until only dermis was left.  The skin graft was then placed in the primary defect and oriented appropriately.

## 2022-06-14 ENCOUNTER — MYC MEDICAL ADVICE (OUTPATIENT)
Dept: ENDOCRINOLOGY | Facility: CLINIC | Age: 47
End: 2022-06-14
Payer: COMMERCIAL

## 2022-06-14 DIAGNOSIS — K75.9 HEPATITIS: ICD-10-CM

## 2022-06-14 DIAGNOSIS — E66.01 MORBID OBESITY (H): Primary | ICD-10-CM

## 2022-06-14 DIAGNOSIS — E28.2 PCOS (POLYCYSTIC OVARIAN SYNDROME): ICD-10-CM

## 2022-06-14 DIAGNOSIS — I10 BENIGN ESSENTIAL HYPERTENSION: ICD-10-CM

## 2022-06-14 PROCEDURE — 99207 PR NO CHARGE LOS: CPT | Performed by: INTERNAL MEDICINE

## 2022-06-14 NOTE — TELEPHONE ENCOUNTER
"See MyC msg to patient. Enc initiated by [facility] \"Therapeutic Alert\" for \"Diabetes without statin therapy\"    Dr. Umu Mahoney MD, MPH  Endocrinologist    "

## 2022-06-17 ENCOUNTER — VIRTUAL VISIT (OUTPATIENT)
Dept: ENDOCRINOLOGY | Facility: CLINIC | Age: 47
End: 2022-06-17
Payer: COMMERCIAL

## 2022-06-17 VITALS — HEIGHT: 70 IN | BODY MASS INDEX: 35.73 KG/M2 | WEIGHT: 249.6 LBS

## 2022-06-17 DIAGNOSIS — E66.01 MORBID OBESITY (H): Primary | ICD-10-CM

## 2022-06-17 PROCEDURE — 99213 OFFICE O/P EST LOW 20 MIN: CPT | Mod: 95 | Performed by: INTERNAL MEDICINE

## 2022-06-17 ASSESSMENT — PAIN SCALES - GENERAL: PAINLEVEL: NO PAIN (0)

## 2022-06-17 NOTE — PROGRESS NOTES
"         Comprehensive Interdisciplinary Medical Weight Management Follow-up Consult       Holli Saxena  MRN:  [id number]  :  1975  ALIA:  2022    Dear Colleagues,    I had the pleasure of seeing your patient Holli Saxena. She is a 47 year old female who I am continuing to see for treatment of obesity related to:    Hypertension  Hyperlipidemia   PCOS  Recently increased LFTs, being addressed by GI    INTERVAL HISTORY:  Has established primary care again and is getting her HTN and other issues addressed. She is pleased with her self care plan. She DOES NOT have a h/o diabetes, so statin therapy is not needed for that reason.  Is taking Oz 1 mg weekly was having diarrhea and that has improved and she wants to continue at that dose for now since is helping.     CURRENT WEIGHT:   249 lbs 9.6 oz                  Changes and Difficulties 3/7/2022   I have made the following changes to my diet since my last visit: I have reduced my caloric intake.   With regards to my diet, I am still struggling with: Adding vegetables. Getting to 1400 calories per day. I m averaging 1700.   I have made the following changes to my activity/exercise since my last visit: I am doing yoga once a week and 20 minutes/3 times a week. Trying to get to 30min/5 days a week.   With regards to my activity/exercise, I am still struggling with: My back issues and lack of motivation after a long day at work. I want to incorporate so activity in the middle of my day to reduce stress.     VITALS:  Ht 1.778 m (5' 10\")   Wt 113.2 kg (249 lb 9.6 oz)   BMI 35.81 kg/m       EXAM:  There were no vitals taken for this virtual visit  Gen: well appearing, nad, pleasant and conversant  HEENT: EOMI  Neuro: A&O    MEDICATIONS: MEDICATIONS IN THIS CHART MAY NOT BE ACCURATE.    Current Outpatient Medications   Medication Sig Dispense Refill     Flaxseed, Linseed, (FLAXSEED OIL PO) Take as directed       lisinopril (ZESTRIL) 20 MG tablet Take 1 " tablet (20 mg) by mouth daily 90 tablet 1     LORazepam (ATIVAN) 0.5 MG tablet Take 1 tablet (0.5 mg) by mouth 2 times daily as needed for anxiety 20 tablet 0     Multiple Vitamin (MULTIVITAMINS PO) Take 1 tablet by mouth daily.       NIFEdipine ER OSMOTIC (PROCARDIA XL) 30 MG 24 hr tablet Take 1 tablet (30 mg) by mouth daily 90 tablet 1     OZEMPIC, 1 MG/DOSE, 4 MG/3ML SOPN INJECT 1MG SUBCUTANEOUSLY ONCE A WEEK 3 mL 0     phentermine (ADIPEX-P) 37.5 MG tablet 1/2 tab in am. 15 tablet 3       Weight Loss Medication History Reviewed With Patient 6/16/2022   Which weight loss medications are you currently taking on a regular basis?  Ozempic   If you are not taking a weight loss medication that was prescribed to you, please indicate why: -   Are you having any side effects from the weight loss medication that we have prescribed you? -   If you are having side effects please describe: -     ASSESSMENT:   Obesity, w/ response to Ozempic 1 mg weekly and wants to continue  Body mass index is 35.81 kg/m .    PLAN:   Patient Instructions:    Nice to talk with you in virtual clinic    Congrats on your success so far!    Food goal: go back to 1400 calories, meal replacements, food journal     Activity goal: stationary bike 30 min daily     Weight goal: weigh self 2x weekly and lose 1-2 lbs weekly  "[employer] roberto ([employer] sent her a scale)     Medication goal: ok to continue Ozempic weekly 1 mg    RTC: quarterly as needed w/ ancillary support staff in between visits as needed    Please use MiniLuxe to schedule your appointment(s) or call [phone] to schedule in [facility]     Dr. Umu Carrillo MD, MPH  Endocrinologist      Video duration: approx 17 minutes      Total Time: 20 min spent on chart review, evaluation, management, counseling, education, & motivational interviewing on the day of encounter

## 2022-06-17 NOTE — NURSING NOTE
"Chief Complaint   Patient presents with     RECHECK     Ret M       Vitals:    06/17/22 0822   Weight: 113.2 kg (249 lb 9.6 oz)   Height: 1.778 m (5' 10\")       Body mass index is 35.81 kg/m .          DEL OV    "

## 2022-06-17 NOTE — PROGRESS NOTES
Holli is a 47 year old who is being evaluated via a billable video visit.      How would you like to obtain your AVS? MyChart  If the video visit is dropped, the invitation should be resent by: Text to cell phone: [phone]  Will anyone else be joining your video visit? No    Video-Visit Details    Type of service:  Video Visit    Video as above    Originating Location (pt. Location): Home    Distant Location (provider location):  [facility] [place]     Platform used for Video Visit: Ztory

## 2022-06-17 NOTE — LETTER
2022       RE: Holli Saxena  [address]  [place] [address]     Dear Colleague,    Thank you for referring your patient, Holli Saxena, to the [facility] [place] at [facility]. Please see a copy of my visit note below.    Holli is a 47 year old who is being evaluated via a billable video visit.      How would you like to obtain your AVS? MyChart  If the video visit is dropped, the invitation should be resent by: Text to cell phone: [phone]  Will anyone else be joining your video visit? No    Video-Visit Details    Type of service:  Video Visit    Video as above    Originating Location (pt. Location): Home    Distant Location (provider location):  [facility] [place]     Platform used for Video Visit: [facility] Interdisciplinary Medical Weight Management Follow-up Consult       Holli Saxena  MRN:  [id number]  :  1975  ALIA:  2022    Dear Colleagues,    I had the pleasure of seeing your patient Holli Saxena. She is a 47 year old female who I am continuing to see for treatment of obesity related to:    Hypertension  Hyperlipidemia   PCOS  Recently increased LFTs, being addressed by GI    INTERVAL HISTORY:  Has established primary care again and is getting her HTN and other issues addressed. She is pleased with her self care plan. She DOES NOT have a h/o diabetes, so statin therapy is not needed for that reason.  Is taking Oz 1 mg weekly was having diarrhea and that has improved and she wants to continue at that dose for now since is helping.     CURRENT WEIGHT:   249 lbs 9.6 oz                  Changes and Difficulties 3/7/2022   I have made the following changes to my diet since my last visit: I have reduced my caloric intake.   With regards to my diet, I am still struggling with: Adding vegetables. Getting to 1400 calories per day.  "I m averaging 1700.   I have made the following changes to my activity/exercise since my last visit: I am doing yoga once a week and 20 minutes/3 times a week. Trying to get to 30min/5 days a week.   With regards to my activity/exercise, I am still struggling with: My back issues and lack of motivation after a long day at work. I want to incorporate so activity in the middle of my day to reduce stress.     VITALS:  Ht 1.778 m (5' 10\")   Wt 113.2 kg (249 lb 9.6 oz)   BMI 35.81 kg/m       EXAM:  There were no vitals taken for this virtual visit  Gen: well appearing, nad, pleasant and conversant  HEENT: EOMI  Neuro: A&O    MEDICATIONS: MEDICATIONS IN THIS CHART MAY NOT BE ACCURATE.    Current Outpatient Medications   Medication Sig Dispense Refill     Flaxseed, Linseed, (FLAXSEED OIL PO) Take as directed       lisinopril (ZESTRIL) 20 MG tablet Take 1 tablet (20 mg) by mouth daily 90 tablet 1     LORazepam (ATIVAN) 0.5 MG tablet Take 1 tablet (0.5 mg) by mouth 2 times daily as needed for anxiety 20 tablet 0     Multiple Vitamin (MULTIVITAMINS PO) Take 1 tablet by mouth daily.       NIFEdipine ER OSMOTIC (PROCARDIA XL) 30 MG 24 hr tablet Take 1 tablet (30 mg) by mouth daily 90 tablet 1     OZEMPIC, 1 MG/DOSE, 4 MG/3ML SOPN INJECT 1MG SUBCUTANEOUSLY ONCE A WEEK 3 mL 0     phentermine (ADIPEX-P) 37.5 MG tablet 1/2 tab in am. 15 tablet 3       Weight Loss Medication History Reviewed With Patient 6/16/2022   Which weight loss medications are you currently taking on a regular basis?  Ozempic   If you are not taking a weight loss medication that was prescribed to you, please indicate why: -   Are you having any side effects from the weight loss medication that we have prescribed you? -   If you are having side effects please describe: -     ASSESSMENT:   Obesity, w/ response to Ozempic 1 mg weekly and wants to continue  Body mass index is 35.81 kg/m .    PLAN:   Patient Instructions:    Nice to talk with you in virtual " clinic    Congrats on your success so far!    Food goal: go back to 1400 calories, meal replacements, food journal     Activity goal: stationary bike 30 min daily     Weight goal: weigh self 2x weekly and lose 1-2 lbs weekly  [employer] roberto ([employer] sent her a scale)     Medication goal: ok to continue Ozempic weekly 1 mg    RTC: quarterly as needed w/ ancillary support staff in between visits as needed    Please use AquaBling to schedule your appointment(s) or call [phone] to schedule in [facility]     Best Wishes,  Dr. Umu Mahoney MD, MPH  Endocrinologist      Video duration: approx 17 minutes      Total Time: 20 min spent on chart review, evaluation, management, counseling, education, & motivational interviewing on the day of encounter

## 2022-06-17 NOTE — PATIENT INSTRUCTIONS
Nice to talk with you in virtual clinic    Congrats on your success so far!    Food goal: go back to 1400 calories, meal replacements, food journal     Activity goal: stationary bike 30 min daily     Weight goal: weigh self 2x weekly and lose 1-2 lbs weekly  [employer] roberto ([employer] sent her a scale)     Medication goal: ok to continue Ozempic weekly 1 mg    RTC: quarterly as needed w/ ancillary support staff in between visits as needed    Please use Secoo to schedule your appointment(s) or call [phone] to schedule in [facility]     Best Wishes,  Dr. Umu Mahoney MD, MPH  Endocrinologist

## 2022-06-22 ENCOUNTER — HOSPITAL ENCOUNTER (OUTPATIENT)
Dept: MAMMOGRAPHY | Facility: CLINIC | Age: 47
Discharge: HOME | End: 2022-06-22
Attending: INTERNAL MEDICINE | Admitting: INTERNAL MEDICINE
Payer: COMMERCIAL

## 2022-06-22 DIAGNOSIS — Z12.31 ENCOUNTER FOR SCREENING MAMMOGRAM FOR BREAST CANCER: ICD-10-CM

## 2022-06-22 PROCEDURE — 77067 SCR MAMMO BI INCL CAD: CPT

## 2022-07-03 DIAGNOSIS — E66.01 MORBID OBESITY (H): ICD-10-CM

## 2022-07-07 ENCOUNTER — MYC REFILL (OUTPATIENT)
Dept: ENDOCRINOLOGY | Facility: CLINIC | Age: 47
End: 2022-07-07

## 2022-07-07 DIAGNOSIS — E66.01 MORBID OBESITY (H): ICD-10-CM

## 2022-07-07 RX ORDER — SEMAGLUTIDE 1.34 MG/ML
INJECTION, SOLUTION SUBCUTANEOUS
Qty: 3 ML | Refills: 0 | OUTPATIENT
Start: 2022-07-07

## 2022-07-07 RX ORDER — SEMAGLUTIDE 1.34 MG/ML
1 INJECTION, SOLUTION SUBCUTANEOUS WEEKLY
Qty: 3 ML | Refills: 3 | Status: SHIPPED | OUTPATIENT
Start: 2022-07-07 | End: 2022-11-15

## 2022-07-07 NOTE — TELEPHONE ENCOUNTER
Patient was just seen 6/8/22 and plan was to continue Ozempic 1mg. Routed to available provider for sign off.

## 2022-10-12 ENCOUNTER — TELEPHONE (OUTPATIENT)
Dept: GASTROENTEROLOGY | Facility: CLINIC | Age: 47
End: 2022-10-12

## 2022-11-03 DIAGNOSIS — E66.01 MORBID OBESITY (H): ICD-10-CM

## 2022-11-09 ENCOUNTER — LAB (OUTPATIENT)
Dept: LAB | Facility: CLINIC | Age: 47
End: 2022-11-09
Payer: COMMERCIAL

## 2022-11-09 DIAGNOSIS — E28.2 PCOS (POLYCYSTIC OVARIAN SYNDROME): ICD-10-CM

## 2022-11-09 DIAGNOSIS — E66.01 MORBID OBESITY (H): ICD-10-CM

## 2022-11-09 DIAGNOSIS — K75.9 HEPATITIS: ICD-10-CM

## 2022-11-09 DIAGNOSIS — E78.5 HYPERLIPIDEMIA LDL GOAL <130: ICD-10-CM

## 2022-11-09 DIAGNOSIS — I10 BENIGN ESSENTIAL HYPERTENSION: ICD-10-CM

## 2022-11-09 LAB
AFP SERPL-MCNC: 4.1 NG/ML
ALBUMIN SERPL-MCNC: 4.1 G/DL (ref 3.4–5)
ALP SERPL-CCNC: 93 U/L (ref 40–150)
ALT SERPL W P-5'-P-CCNC: 39 U/L (ref 0–50)
ANION GAP SERPL CALCULATED.3IONS-SCNC: 8 MMOL/L (ref 3–14)
AST SERPL W P-5'-P-CCNC: 30 U/L (ref 0–45)
BILIRUB SERPL-MCNC: 0.3 MG/DL (ref 0.2–1.3)
BUN SERPL-MCNC: 10 MG/DL (ref 7–30)
CALCIUM SERPL-MCNC: 9.8 MG/DL (ref 8.5–10.1)
CHLORIDE BLD-SCNC: 107 MMOL/L (ref 94–109)
CHOLEST SERPL-MCNC: 196 MG/DL
CO2 SERPL-SCNC: 24 MMOL/L (ref 20–32)
CREAT SERPL-MCNC: 0.67 MG/DL (ref 0.52–1.04)
ERYTHROCYTE [DISTWIDTH] IN BLOOD BY AUTOMATED COUNT: 12.1 % (ref 10–15)
FASTING STATUS PATIENT QL REPORTED: YES
FASTING STATUS PATIENT QL REPORTED: YES
GFR SERPL CREATININE-BSD FRML MDRD: >90 ML/MIN/1.73M2
GLUCOSE BLD-MCNC: 135 MG/DL (ref 70–99)
GLUCOSE BLD-MCNC: 135 MG/DL (ref 70–99)
HBA1C MFR BLD: 6.5 % (ref 0–5.6)
HCT VFR BLD AUTO: 45.8 % (ref 35–47)
HDLC SERPL-MCNC: 38 MG/DL
HGB BLD-MCNC: 15.8 G/DL (ref 11.7–15.7)
INR PPP: 1.04 (ref 0.85–1.15)
LDLC SERPL CALC-MCNC: 108 MG/DL
MCH RBC QN AUTO: 30.9 PG (ref 26.5–33)
MCHC RBC AUTO-ENTMCNC: 34.5 G/DL (ref 31.5–36.5)
MCV RBC AUTO: 90 FL (ref 78–100)
NONHDLC SERPL-MCNC: 158 MG/DL
PLATELET # BLD AUTO: 407 10E3/UL (ref 150–450)
POTASSIUM BLD-SCNC: 4.5 MMOL/L (ref 3.4–5.3)
PROT SERPL-MCNC: 8 G/DL (ref 6.8–8.8)
RBC # BLD AUTO: 5.11 10E6/UL (ref 3.8–5.2)
SODIUM SERPL-SCNC: 139 MMOL/L (ref 133–144)
TRIGL SERPL-MCNC: 251 MG/DL
TSH SERPL DL<=0.005 MIU/L-ACNC: 1.89 MU/L (ref 0.4–4)
WBC # BLD AUTO: 7.1 10E3/UL (ref 4–11)

## 2022-11-09 PROCEDURE — 80053 COMPREHEN METABOLIC PANEL: CPT

## 2022-11-09 PROCEDURE — 84443 ASSAY THYROID STIM HORMONE: CPT

## 2022-11-09 PROCEDURE — 86341 ISLET CELL ANTIBODY: CPT | Mod: 90

## 2022-11-09 PROCEDURE — 85610 PROTHROMBIN TIME: CPT

## 2022-11-09 PROCEDURE — 80061 LIPID PANEL: CPT

## 2022-11-09 PROCEDURE — 85027 COMPLETE CBC AUTOMATED: CPT

## 2022-11-09 PROCEDURE — 82105 ALPHA-FETOPROTEIN SERUM: CPT

## 2022-11-09 PROCEDURE — 36415 COLL VENOUS BLD VENIPUNCTURE: CPT

## 2022-11-09 PROCEDURE — 83036 HEMOGLOBIN GLYCOSYLATED A1C: CPT

## 2022-11-09 PROCEDURE — 84681 ASSAY OF C-PEPTIDE: CPT

## 2022-11-09 PROCEDURE — 99000 SPECIMEN HANDLING OFFICE-LAB: CPT

## 2022-11-09 RX ORDER — SEMAGLUTIDE 1.34 MG/ML
1 INJECTION, SOLUTION SUBCUTANEOUS WEEKLY
Qty: 3 ML | Refills: 3 | OUTPATIENT
Start: 2022-11-09

## 2022-11-09 NOTE — TELEPHONE ENCOUNTER
Refill denied at this time. Patient needs appointment with weight management clinic. [employer] message sent to patient to schedule appointment.

## 2022-11-09 NOTE — TELEPHONE ENCOUNTER
Pt needs appt, previous pt of Maru BURRELL: 6-17-22  FYI to clinic  FYI to scheduling    Recieved refill request for Semaglutide . Patient needs appointment scheduled prior to any refills. Clinic Coordinator notified and will follow up with the patient as appropriate. The pharmacy has been notified that the medication will not be refilled prior to an appointment being scheduled.

## 2022-11-10 LAB — C PEPTIDE SERPL-MCNC: 8.2 NG/ML (ref 0.9–6.9)

## 2022-11-11 LAB — PANC ISLET CELL AB TITR SER: NORMAL {TITER}

## 2022-11-12 LAB — GAD65 AB SER IA-ACNC: <5 IU/ML

## 2022-11-15 DIAGNOSIS — E66.01 MORBID OBESITY (H): ICD-10-CM

## 2022-11-15 RX ORDER — SEMAGLUTIDE 1.34 MG/ML
1 INJECTION, SOLUTION SUBCUTANEOUS WEEKLY
Qty: 3 ML | Refills: 0 | Status: SHIPPED | OUTPATIENT
Start: 2022-11-15 | End: 2022-11-30

## 2022-11-29 NOTE — PROGRESS NOTES
Virtual Visit Check-In    During this virtual visit the patient is located in [place], patient verifies this as the location during the entirety of this visit.     Holli is a 47 year old who is being evaluated via a billable video visit.      How would you like to obtain your AVS? MyChart  If the video visit is dropped, the invitation should be resent by: Text to cell phone: [phone]  Will anyone else be joining your video visit? No        Video-Visit Details    Video Start Time: 8:55AM    Type of service:  Video Visit    Video End Time:9:16AM    Originating Location (pt. Location): Home        Distant Location (provider location):  Off-site    Platform used for Video Visit: Nella Sam NREMT      Return Medical Weight Management Note     Holli Saxena  MRN:  [id number]  :  1975  ALIA:  2022    Dear Freya Scales MD,    I had the pleasure of seeing your patient Holli Saxena. She is a 47 year old female who I am continuing to see for treatment of obesity related to:    No flowsheet data found.    Assessment & Plan   Problem List Items Addressed This Visit        Digestive    Class 2 severe obesity with serious comorbidity and body mass index (BMI) of 35.0 to 35.9 in adult (H) - Primary     Previously followed by Dr. Mahoney since . Has tried phentermine, topirmate, and contrave. Currently taking Ozempic 1mg, and is helpign with hunger sessation. Denies any side effects. Has lost 9lbs since last visit. Currently intermitent fasting, and helps with after dinner snacking.     Discussed new diagnosis of Type II diabetes. Ozempic and continued weight loss with help with continued control. Will check A1C in 6 months.          Relevant Medications    Semaglutide, 1 MG/DOSE, (OZEMPIC, 1 MG/DOSE,) 4 MG/3ML SOPN       Endocrine    Type 2 diabetes mellitus (H)    Relevant Medications    Semaglutide, 1 MG/DOSE, (OZEMPIC, 1 MG/DOSE,) 4 MG/3ML SOPN      1. Refilled Ozempic    2. Follow up with Tsering Grijalva in 3 months   3. Check A1C in 6 months     INTERVAL HISTORY:  First seen by Dr. Mahoney - 7/2010  Starting weight - 225lb  Started Metformin - stopped  Started Topiramate - discontinued due to side effect of fatigue   Started Phentermine - discontinued due to pregnancy   Weight gain due to pregnancy - 2018   Weight post pregnancy - 264lb  Started Contrave - stopped   Restarted phentermine and topiramate  - discontinued during pandemic  Ozempic - 1mg    Has been going well since last visit. Has lost 9lbs.   Weight today - 247lb     Anti-obesity medications:     Current:   Ozempic 1mg - intitial side effects of nausea and diarrhea, but was able to tolerate. Currently has no side effect. Has been helping with hunger control. Does not feel like an increase of dose is needed.     Failed/contraindicated:   Metformin - not helpeful with weightloss. Denies side effects.   Topiramate - side effects of fatigure   Phentermine - Stopped helping with hunger suppression. Some constipation. Denies side effects.   Contrave - not helpful with weightloss. Denies side effects.     Recent diet changes: Currently intermittent fasting - fast from 6:30pm-10:30am. Has been very helpful. Helps with decrease snacking in evening. Still struggles with stress eating.     Recent exercise/activity changes: Stationary bike. 2xweek for 20min at a time. Goal to increase to 5x for 30min.     Recent stressors:  Work is stressful. Trying to be more mindful of self and stress.     Recent sleep changes: No concerns     Vitamins/Labs: Multivitamin, Omega - 3. Reviewed labs     HTN - well controlled on medications.     Anxiety - takes ativan as needed. Not followed by therapy or [facility]    CURRENT WEIGHT:   247 lbs 4.8 oz    Initial Weight (lbs): 225 lbs  Last Visits Weight: 114.8 kg (253 lb)  Cumulative weight loss (lbs): -22.3  Weight Loss Percentage: -9.91%    Changes and Difficulties 11/29/2022   I have made the following  "changes to my diet since my last visit: Intermittent fasting 16/8   With regards to my diet, I am still struggling with: -   I have made the following changes to my activity/exercise since my last visit: -   With regards to my activity/exercise, I am still struggling with: -         MEDICATIONS:   Current Outpatient Medications   Medication Sig Dispense Refill     Flaxseed, Linseed, (FLAXSEED OIL PO) Take as directed       lisinopril (ZESTRIL) 20 MG tablet Take 1 tablet (20 mg) by mouth daily 90 tablet 1     LORazepam (ATIVAN) 0.5 MG tablet Take 1 tablet (0.5 mg) by mouth 2 times daily as needed for anxiety 20 tablet 0     Multiple Vitamin (MULTIVITAMINS PO) Take 1 tablet by mouth daily.       NIFEdipine ER OSMOTIC (PROCARDIA XL) 30 MG 24 hr tablet Take 1 tablet (30 mg) by mouth daily 90 tablet 1     Semaglutide, 1 MG/DOSE, (OZEMPIC, 1 MG/DOSE,) 4 MG/3ML SOPN Inject 1 mg Subcutaneous once a week 3 mL 0       Weight Loss Medication History Reviewed With Patient 11/29/2022   Which weight loss medications are you currently taking on a regular basis?  Ozempic   If you are not taking a weight loss medication that was prescribed to you, please indicate why: -   Are you having any side effects from the weight loss medication that we have prescribed you? No   If you are having side effects please describe: -           PHYSICAL EXAM:  Objective    Ht 1.778 m (5' 10\")   Wt 112.2 kg (247 lb 4.8 oz)   BMI 35.48 kg/m             Vitals:  No vitals were obtained today due to virtual visit.    GENERAL: Healthy, alert and no distress  EYES: Eyes grossly normal to inspection.  No discharge or erythema, or obvious scleral/conjunctival abnormalities.  RESP: No audible wheeze, cough, or visible cyanosis.  No visible retractions or increased work of breathing. SKIN: Visible skin clear. No significant rash, abnormal pigmentation or lesions.  NEURO: Cranial nerves grossly intact.  Mentation and speech appropriate for age.  PSYCH: " Mentation appears normal, affect normal/bright, judgement and insight intact, normal speech and appearance well-groomed.        Sincerely,    TALYA JORDAN PA-C      30 minutes spent on the date of the encounter doing chart review, history and exam, documentation and further activities per the note

## 2022-11-30 ENCOUNTER — VIRTUAL VISIT (OUTPATIENT)
Dept: ENDOCRINOLOGY | Facility: CLINIC | Age: 47
End: 2022-11-30
Payer: COMMERCIAL

## 2022-11-30 ENCOUNTER — TELEPHONE (OUTPATIENT)
Dept: ENDOCRINOLOGY | Facility: CLINIC | Age: 47
End: 2022-11-30

## 2022-11-30 VITALS — WEIGHT: 247.3 LBS | BODY MASS INDEX: 35.4 KG/M2 | HEIGHT: 70 IN

## 2022-11-30 DIAGNOSIS — E11.9 TYPE 2 DIABETES MELLITUS WITHOUT COMPLICATION, WITHOUT LONG-TERM CURRENT USE OF INSULIN (H): ICD-10-CM

## 2022-11-30 DIAGNOSIS — E66.01 CLASS 2 SEVERE OBESITY WITH SERIOUS COMORBIDITY AND BODY MASS INDEX (BMI) OF 35.0 TO 35.9 IN ADULT, UNSPECIFIED OBESITY TYPE (H): Primary | ICD-10-CM

## 2022-11-30 DIAGNOSIS — E66.812 CLASS 2 SEVERE OBESITY WITH SERIOUS COMORBIDITY AND BODY MASS INDEX (BMI) OF 35.0 TO 35.9 IN ADULT, UNSPECIFIED OBESITY TYPE (H): Primary | ICD-10-CM

## 2022-11-30 PROCEDURE — 99203 OFFICE O/P NEW LOW 30 MIN: CPT | Mod: 95

## 2022-11-30 RX ORDER — SEMAGLUTIDE 1.34 MG/ML
1 INJECTION, SOLUTION SUBCUTANEOUS WEEKLY
Qty: 3 ML | Refills: 0 | Status: SHIPPED | OUTPATIENT
Start: 2022-11-30 | End: 2022-12-15

## 2022-11-30 NOTE — TELEPHONE ENCOUNTER
Prior Authorization Retail Medication Request    Medication/Dose: Ozempic  ICD code (if different than what is on RX):  Class 2 severe obesity with serious comorbidity and body mass index (BMI) of 35.0 to 35.9 in adult, unspecified obesity type (H) [E66.01, Z68.35]  - Primary     Previously Tried and Failed:  Metformin, Topiramate, Phentermine, Contrave, history of diet and exercise  Rationale:  I had the pleasure of seeing your patient Holli Saxena. She is a 47 year old female who I am continuing to see for treatment of obesity       INTERVAL HISTORY:  First seen by Dr. Mahoney - 7/2010  Starting weight - 225lb  Started Metformin - stopped  Started Topiramate - discontinued due to side effect of fatigue   Started Phentermine - discontinued due to pregnancy   Weight gain due to pregnancy - 2018   Weight post pregnancy - 264lb  Started Contrave - stopped   Restarted phentermine and topiramate  - discontinued during pandemic  Ozempic - 1mg    Has been going well since last visit. Has lost 9lbs.   Weight today - 247lb      Anti-obesity medications:      Current:   Ozempic 1mg - intitially side effects of nausea and diarrhea, but was able to tolerate. Currently has no side effect. Has been helping with hunger control.       Failed/contraindicated:   Metformin - was not helpeful with weightloss. Denies side effects.   Topiramate - side effects of fatigure   Phentermine - Was the most effective, no longer helped with hunger. Some constipation. Denies side effects.   Contrave - didn't help with weightloss.     Recent diet changes: Currently intermittent fasting - fast from 6:30am-10:30am. Has been very helpful. Helps with decrease snacking in evening. Still struggles with stress eating.      Recent exercise/activity changes: Stationary bike. 2xweek for 20min at a time. Goal to increase to 5x for 30min.      CURRENT WEIGHT:   247 lbs 4.8 oz     Initial Weight (lbs): 225 lbs  Last Visits Weight: 114.8 kg (253  lb)  Cumulative weight loss (lbs): -22.3  Weight Loss Percentage: -9.91%    Insurance Name:    Insurance ID:        Pharmacy Information (if different than what is on RX)  Name:  [id number] - [place], [place] - [address] AT [facility] [address]  Phone:  [phone]

## 2022-11-30 NOTE — ASSESSMENT & PLAN NOTE
Previously followed by Dr. Mahoney since 2010. Has tried phentermine, topirmate, and contrave. Currently taking Ozempic 1mg, and is helpign with hunger sessation. Denies any side effects. Has lost 9lbs since last visit. Currently intermitent fasting, and helps with after dinner snacking.     Discussed new diagnosis of Type II diabetes. Ozempic and continued weight loss with help with continued control. Will check A1C in 6 months.

## 2022-11-30 NOTE — LETTER
2022       RE: Holli Saxena  [address]  [place] [address]     Dear Colleague,    Thank you for referring your patient, Holli Saxena, to the [facility] [place] [facility]. Please see a copy of my visit note below.      Return Medical Weight Management Note     Holli Saxena  MRN:  [id number]  :  1975  ALIA:  2022    Dear Freya Scales MD,    I had the pleasure of seeing your patient Holli Saxena. She is a 47 year old female who I am continuing to see for treatment of obesity related to:    No flowsheet data found.    Assessment & Plan   Problem List Items Addressed This Visit        Digestive    Class 2 severe obesity with serious comorbidity and body mass index (BMI) of 35.0 to 35.9 in adult (H) - Primary     Previously followed by Dr. Mahoney since . Has tried phentermine, topirmate, and contrave. Currently taking Ozempic 1mg, and is helpign with hunger sessation. Denies any side effects. Has lost 9lbs since last visit. Currently intermitent fasting, and helps with after dinner snacking.     Discussed new diagnosis of Type II diabetes. Ozempic and continued weight loss with help with continued control. Will check A1C in 6 months.          Relevant Medications    Semaglutide, 1 MG/DOSE, (OZEMPIC, 1 MG/DOSE,) 4 MG/3ML SOPN       Endocrine    Type 2 diabetes mellitus (H)    Relevant Medications    Semaglutide, 1 MG/DOSE, (OZEMPIC, 1 MG/DOSE,) 4 MG/3ML SOPN      1. Refilled Ozempic   2. Follow up with Tsering Grijalva in 3 months   3. Check A1C in 6 months     INTERVAL HISTORY:  First seen by Dr. Mahoney - 2010  Starting weight - 225lb  Started Metformin - stopped  Started Topiramate - discontinued due to side effect of fatigue   Started Phentermine - discontinued due to pregnancy   Weight gain due to pregnancy -    Weight post pregnancy - 264lb  Started Contrave -  stopped   Restarted phentermine and topiramate  - discontinued during pandemic  Ozempic - 1mg    Has been going well since last visit. Has lost 9lbs.   Weight today - 247lb     Anti-obesity medications:     Current:   Ozempic 1mg - intitial side effects of nausea and diarrhea, but was able to tolerate. Currently has no side effect. Has been helping with hunger control. Does not feel like an increase of dose is needed.     Failed/contraindicated:   Metformin - not helpeful with weightloss. Denies side effects.   Topiramate - side effects of fatigure   Phentermine - Stopped helping with hunger suppression. Some constipation. Denies side effects.   Contrave - not helpful with weightloss. Denies side effects.     Recent diet changes: Currently intermittent fasting - fast from 6:30pm-10:30am. Has been very helpful. Helps with decrease snacking in evening. Still struggles with stress eating.     Recent exercise/activity changes: Stationary bike. 2xweek for 20min at a time. Goal to increase to 5x for 30min.     Recent stressors:  Work is stressful. Trying to be more mindful of self and stress.     Recent sleep changes: No concerns     Vitamins/Labs: Multivitamin, Omega - 3. Reviewed labs     HTN - well controlled on medications.     Anxiety - takes ativan as needed. Not followed by therapy or [facility]    CURRENT WEIGHT:   247 lbs 4.8 oz    Initial Weight (lbs): 225 lbs  Last Visits Weight: 114.8 kg (253 lb)  Cumulative weight loss (lbs): -22.3  Weight Loss Percentage: -9.91%    Changes and Difficulties 11/29/2022   I have made the following changes to my diet since my last visit: Intermittent fasting 16/8   With regards to my diet, I am still struggling with: -   I have made the following changes to my activity/exercise since my last visit: -   With regards to my activity/exercise, I am still struggling with: -         MEDICATIONS:   Current Outpatient Medications   Medication Sig Dispense Refill     Flaxseed, Linseed,  "(FLAXSEED OIL PO) Take as directed       lisinopril (ZESTRIL) 20 MG tablet Take 1 tablet (20 mg) by mouth daily 90 tablet 1     LORazepam (ATIVAN) 0.5 MG tablet Take 1 tablet (0.5 mg) by mouth 2 times daily as needed for anxiety 20 tablet 0     Multiple Vitamin (MULTIVITAMINS PO) Take 1 tablet by mouth daily.       NIFEdipine ER OSMOTIC (PROCARDIA XL) 30 MG 24 hr tablet Take 1 tablet (30 mg) by mouth daily 90 tablet 1     Semaglutide, 1 MG/DOSE, (OZEMPIC, 1 MG/DOSE,) 4 MG/3ML SOPN Inject 1 mg Subcutaneous once a week 3 mL 0       Weight Loss Medication History Reviewed With Patient 11/29/2022   Which weight loss medications are you currently taking on a regular basis?  Ozempic   If you are not taking a weight loss medication that was prescribed to you, please indicate why: -   Are you having any side effects from the weight loss medication that we have prescribed you? No   If you are having side effects please describe: -           PHYSICAL EXAM:  Objective     Ht 1.778 m (5' 10\")   Wt 112.2 kg (247 lb 4.8 oz)   BMI 35.48 kg/m             Vitals:  No vitals were obtained today due to virtual visit.    GENERAL: Healthy, alert and no distress  EYES: Eyes grossly normal to inspection.  No discharge or erythema, or obvious scleral/conjunctival abnormalities.  RESP: No audible wheeze, cough, or visible cyanosis.  No visible retractions or increased work of breathing. SKIN: Visible skin clear. No significant rash, abnormal pigmentation or lesions.  NEURO: Cranial nerves grossly intact.  Mentation and speech appropriate for age.  PSYCH: Mentation appears normal, affect normal/bright, judgement and insight intact, normal speech and appearance well-groomed.        Sincerely,    TALYA JORDAN PA-C      30 minutes spent on the date of the encounter doing chart review, history and exam, documentation and further activities per the note  "

## 2022-11-30 NOTE — PATIENT INSTRUCTIONS
"Thank you for allowing us the privilege of caring for you. We hope we provided you with the excellent service you deserve.   Please let us know if there is anything else we can do for you so that we can be sure you are completely satisfied with your care experience.    To ensure the quality of our services you may be receiving a patient satisfaction survey from an independent patient satisfaction monitoring company.    The greatest compliment you can give is a \"Likely to Recommend\"    Your visit was with TALYA JORDAN PA-C today.    Instructions per today's visit:     Mac Saxena, it was great to visit with you today.  Here is a review of our visit.  If our clinic scheduler is not able to reach you please call [phone] to schedule your next appointments.    Refilled Ozempic 1mg weekly. Can consider increase dose in the future if needed.   Check A1C in 6 months   Follow up with Talya Grijalva in 3 months       Information about Video Visits with [facility]: video visit information  _________________________________________________________________________________________________________________________________________________________  If you are asked by your clinic team to have your blood pressure checked:  [facility] do offer several locations for blood pressure checks. Please follow the below link to schedule an appointment. Scheduling an appointment at the pharmacy for a blood pressure check is now preferred.    Appointment Plus (appointment-plus.[employer] Software)  _________________________________________________________________________________________________________________________________________________________  Important contact and scheduling information:  Please call our contact center at [phone] to schedule your next appointments.  To find a lab location near you, please call [phone].  For any nursing questions or concerns call Zaynab Mora LPN at [phone] or Lissy Davis RN at " [phone]  Please call during clinic hours Monday through Friday 8:00a - 4:00p if you have questions or you can contact us via [employer]hart at anytime and we will reply during clinic hours.    Lab results will be communicated through My Chart or letter (if My Chart not used). Please call the clinic if you have not received communication after 1 week or if you have any questions.?  Clinic Fax: [phone]    _________________________________________________________________________________________________________________________________________________________  Meal Replacement Products:    Here is the link to our new e-store where you can purchase our meal replacement products    [employer] E-Store  [employer]/store    The one week starter kit is a great way to sample a variety of products and see what works for you.    If you want more information about the product go to: [employer]Rock[employer] Your Paper    If you are an employee or [facility] or [employer] please contact your care team for a 10% estore discount    Free Shipping for orders over $75     Benefits of meal replacements products:    Portion and calorie control  Improved nutrition  Structured eating  Simplified food choices  Avoid contact with trigger foods  _________________________________________________________________________________________________________________________________________________________  Interested in working with a health ?  Health coaches work with you to improve your overall health and wellbeing.  They look at the whole person, and may involve discussion of different areas of life, including, but not limited to the four pillars of health (sleep, exercise, nutrition, and stress management). Discuss with your care team if you would like to start working a health .  Health Coaching-3 Pack: Schedule by calling [phone]    $99 for three health coaching visits    Visits may  be done in person or via phone    Coaching is a partnership between the  and the client; Coaches do not prescribe or diagnose    Coaching helps inspire the client to reach his/her personal goals   _________________________________________________________________________________________________________________________________________________________  24 Week Healthy Lifestyle Plan:    Our mission in the 24-week Healthy Lifestyle Plan is to provide you with individualized care by giving you the tools, education and support you need to lose weight and maintain a healthy lifestyle. In your 24-week journey, you ll be supported by a dedicated weight loss team that includes registered dietitians, medical weight management providers, health coaches, and nurses -- all with special expertise in weight loss -- to help you every step of the way.     Monthly meetings with your registered dietician or medical weight management provider help to review your progress, update your care plan, and make any adjustments needed to ensure success. Between these visits, weekly and bi-weekly health  visits will help you focus on the four pillars of weight loss -- stress, sleep, nutrition, and exercise -- and how you can best adapt each to achieve sustainable weight loss results.    In addition, you will be given exclusive access to online wellbeing classes through [employer].  Your initial visit will be with a medical weight management provider who will help to understand your weight loss goals and ensure this program is the right fit for you. Please let our team know if you are interested in the 24 week plan by sending a message to your care team or calling [phone] to schedule.  _________________________________________________________________________________________________________________________________________________________    COMPREHENSIVE WEIGHT MANAGEMENT PROGRAM  VIRTUAL SUPPORT GROUPS    For Support Group  "Information:      We offer support groups for patients who are working on weight loss and considering, preparing for or have had weight loss surgery.   There is no cost for this opportunity.  You are invited to attend the?Virtual Support Groups?provided by any of the following locations:    [facility] via Microsoft Teams with Greta Soto RN  2.   [place] via [email or web address].com with Ridge Cummings, PhD, LP  3.   [place] via [email or web address].com with Citlali Ash RN  4.   [facility] via Racktivity Teams with Citlali Armenta [facility]    The following Support Group information can also be found on our website:  [email or web address]    [facility] Weight Loss Surgery Support Group    [facility] Weight Loss Surgery Support Group  The support group is a patient-lead forum that meets monthly to share experiences, encouragement and education. It is open to those who have had weight loss surgery, are scheduled for surgery, and those who are considering surgery.   WHEN: This group meets on the 3rd Wednesday of each month from 5:00PM - 6:00PM virtually using Microsoft Teams.   FACILITATOR: Led by Greta Soto RD, LD, RN, the program's Clinical Coordinator.   TO REGISTER: Please contact the clinic via [employer] or call the nurse line directly at [phone] to inform our staff that you would like an invite sent to you and to let us know the email you would like the invite sent to. Prior to the meeting, a link with directions on how to join the meeting will be sent to you.    2022 Meetings  Komal 15: \"Let's Talk\" a time for the group to share.  July 20: \"Let's Talk\" a time for the group to share.  August 17: \"Let's Talk\" a time for the group to share.  September 21: \"Let's Talk\" a time for the group to share.  October 19: Guest Speaker: Dr Ney Rush MD Pulmonologist and Sleep Medicine Physician, \"Getting a Good Night's Sleep\".  November 16: \"Let's " "Talk\" a time for the group to share.  December 21: \"Let's Talk\" a time for the group to share.    [facility] and [facility] Support Groups    Connections: Bariatric Care Support Group?  This is open to all [facility] (and those external to this program) pre- and post- operative bariatric surgery patients as well as their support system.   WHEN: This group meets the 2nd Tuesday of each month from 6:30 PM - 8:00 PM virtually using Microsoft Teams.   FACILITATOR: Led by Ridge Cummings, Ph.D who is a Licensed Psychologist with the [facility] Comprehensive Weight Management Program.   TO REGISTER: Please send an email to Ridge Cummings, Ph.D., LP at?[email or web address]?if you would like an invitation to the group and to learn about using Microsoft Teams.    2022 Meetings  June 14: Viry Valentin RD, LD at [facility], \"Nutritional Labeling\"  July 12 August 2 (Please Note Date Change)  September 13 October 11 November 8 December 13    Connections: Post-Operative Bariatric Surgery Support Group  This is a support group for [facility] bariatric patients (and those external to [facility]) who have had bariatric surgery and are at least 3 months post-surgery.  WHEN: This support group meets the 4th Wednesday of the month from 11:00 AM - 12:00 PM virtually using Microsoft Teams.   FACILITATOR: Led by Certified Bariatric Nurse, Citlali Ash RN.   TO REGISTER: Please send an email to Citlali at [email or web address] if you would like an invitation to the group and to learn about using Microsoft Teams.    2022 Meetings June 22 July 27 August 24 September 28 October 26 November 23 December 28      [facility] Healthy Lifestyle Virtual Support Group    Healthy Lifestyle Virtual Support Group?  This is 60 minutes of small group guided discussion, support and resources. All are welcome who want a healthy " "lifestyle.  WHEN: This group meets monthly on a Friday from 12:30 PM - 1:30 PM virtually using Microsoft Teams.   FACILITATOR: Led by National Board Certified Health and , Citlali Armenta [facility].   TO REGISTER: Please send an email to Citlali [email or web address] to receive monthly invites to the group or if you have any questions about having a health .  Prior to the meeting, a link with directions on how to join the meeting will be sent to you.    2022 Meetings  June 24: Citlali [facility]a [facility], \"Setting Limits and Boundaries\".  Jul 29: Open Forum  August 26: Guest Speaker: Gretel Diaz Registered Dietitian  September 30: Open Forum  October 28th: Guest Speaker: Mariely Jorgensen [facility], Health , \"Gratitude Practices\".  November 18: Guest Speaker: Rody Rolle RD Registered Dietitian, \"Navigating How to Eat around the Holidays\".  December 16: Guest Speaker: Nyasia Daniel [facility], \"Changing Your Relationship with Movement\".    ____________________________________________________________________________________________________________________________________________________________________________  [facility] Get Moving Program  Our team of physical therapists is trained to help you understand and take control of your condition. They will perform a thorough evaluation to determine your ability for activity and develop a customized plan to fit your goals and physical ability.  Scheduling: Unsure if the Get Moving program is right for you? Discuss the program with your medical provider or diabetes educator. You can also call us at [phone] to ask questions or schedule an appointment.   [facility] Get Moving Program  ____________________________________________________________________________________________________________________________________________________________________________  [facility] Diabetes Prevention Program (DPP)  If you have prediabetes and Medicare " please contact us via PlanZap to learn more about the Diabetes Prevention Program (DPP)  Program Details:  [facility] offers the year-long Diabetes Prevention Program (DPP). The program helps you to make lifestyle changes that prevent or delay type 2 diabetes by supporting healthy eating, increased physical activity, stress reduction and use of coping skills.   On average, previous [facility] DPP cohorts have lost and maintained at least 5% of their starting weight throughout the program and averaged more than 150 minutes of physical activity per week.  Participants meet weekly for one-hour group sessions over sixteen weeks, every other week for the next 8 weeks, and monthly for the last six months.   A year-long maintenance program is also available for participants who complete the first year.   Location & Cost:   During the COVID-19 Public Health Emergency, the program is offered virtually. When in-person classes can resume, they will be held at [facility].  For people with Medicare, the program is covered in full. A self-pay option will also be available for those with non-Medicare insurance plans.   _________________________________________________________________________________________________________________________________________________________  Bluetooth Scale:    We hope to provide you with high quality virtual healthcare visits while social distancing for COVID-19 is necessary, as well as in the future when virtual visits may be more convenient for you.     Our technology team made it possible for Bluetooth scales to send weight measurements to our electronic medical record. This allows weights from you weighing at home to securely flow into the medical record, which will improve telephone and virtual visits.   Additionally, studies have shown that adults actually lose more weight when their weights are automatically sent to someone else, and also that  this process is not stressful for those adults.    Below is a link for purchasing the scale, with a discount code for our patients. You may call your insurance company to see if they will reimburse you for the cost of the scale, as a piece of durable medical equipment. The scales only go up to a weight of 400 pounds. This is an issue and we are working with the developer on increasing this. We found no scales that go over 400lb that have blue-tooth for connecting to [employer].    Scale to purchase: the Wish Days  Body  Scale: https://[email or web address].B[employer]/us/en/body/shop?[email or web address]=aw.ds    Discount Code: We have a discount code for our patients to bring the cost down to $50, Discount code is: [place]_Scale_20%off  _______________________________________________________________________________________________________________________________________________________________________________    To work with a Behavioral Health Psychologist:    Call to schedule:    Jorge Torres - [phone]  Mayra Garner - [phone]  Kasia Del Toro - [phone]  Bibi Leach - [phone]   Leti Wells PhD (cannot accept Medicare) [phone]        Thank you,   [facility] Comprehensive Weight Management Team

## 2022-11-30 NOTE — NURSING NOTE
Chief Complaint   Patient presents with     Follow Up     Return weight management.         Vitals:    11/30/22 0843   Weight: 247 lb 4.8 oz       Body mass index is 35.48 kg/m .      Remi Sam, EMT  Surgery Clinic

## 2022-12-01 ENCOUNTER — TELEPHONE (OUTPATIENT)
Dept: ENDOCRINOLOGY | Facility: CLINIC | Age: 47
End: 2022-12-01

## 2022-12-12 DIAGNOSIS — E66.01 CLASS 2 SEVERE OBESITY WITH SERIOUS COMORBIDITY AND BODY MASS INDEX (BMI) OF 35.0 TO 35.9 IN ADULT, UNSPECIFIED OBESITY TYPE (H): ICD-10-CM

## 2022-12-12 DIAGNOSIS — E66.812 CLASS 2 SEVERE OBESITY WITH SERIOUS COMORBIDITY AND BODY MASS INDEX (BMI) OF 35.0 TO 35.9 IN ADULT, UNSPECIFIED OBESITY TYPE (H): ICD-10-CM

## 2022-12-15 RX ORDER — SEMAGLUTIDE 1.34 MG/ML
1 INJECTION, SOLUTION SUBCUTANEOUS WEEKLY
Qty: 3 ML | Refills: 1 | Status: SHIPPED | OUTPATIENT
Start: 2022-12-15 | End: 2023-03-06

## 2023-02-13 DIAGNOSIS — E66.01 MORBID OBESITY (H): ICD-10-CM

## 2023-02-13 DIAGNOSIS — E28.2 PCOS (POLYCYSTIC OVARIAN SYNDROME): ICD-10-CM

## 2023-02-15 NOTE — TELEPHONE ENCOUNTER
Lisinopril 20 mg tab  Passes protocol    Appointments in Next Year      Feb 22, 2023  8:00 AM  (Arrive by 7:40 AM)  Provider Visit with Freya Scales MD  [facility] ([facility] ) [phone]

## 2023-02-16 RX ORDER — LISINOPRIL 20 MG/1
TABLET ORAL
Qty: 90 TABLET | Refills: 1 | Status: SHIPPED | OUTPATIENT
Start: 2023-02-16 | End: 2023-08-21

## 2023-02-27 DIAGNOSIS — E28.2 PCOS (POLYCYSTIC OVARIAN SYNDROME): ICD-10-CM

## 2023-02-27 RX ORDER — NIFEDIPINE 30 MG/1
30 TABLET, EXTENDED RELEASE ORAL DAILY
Qty: 90 TABLET | Refills: 0 | Status: SHIPPED | OUTPATIENT
Start: 2023-02-27 | End: 2023-06-01

## 2023-03-02 DIAGNOSIS — E66.01 CLASS 2 SEVERE OBESITY WITH SERIOUS COMORBIDITY AND BODY MASS INDEX (BMI) OF 35.0 TO 35.9 IN ADULT, UNSPECIFIED OBESITY TYPE (H): ICD-10-CM

## 2023-03-02 DIAGNOSIS — E66.812 CLASS 2 SEVERE OBESITY WITH SERIOUS COMORBIDITY AND BODY MASS INDEX (BMI) OF 35.0 TO 35.9 IN ADULT, UNSPECIFIED OBESITY TYPE (H): ICD-10-CM

## 2023-03-06 RX ORDER — SEMAGLUTIDE 1.34 MG/ML
1 INJECTION, SOLUTION SUBCUTANEOUS WEEKLY
Qty: 3 ML | Refills: 0 | Status: SHIPPED | OUTPATIENT
Start: 2023-03-06 | End: 2023-04-06

## 2023-03-06 NOTE — TELEPHONE ENCOUNTER
Last Clinic Visit: 11/30/2022 [facility] [place]  Future Visit: 4/26/2023

## 2023-04-03 DIAGNOSIS — E66.812 CLASS 2 SEVERE OBESITY WITH SERIOUS COMORBIDITY AND BODY MASS INDEX (BMI) OF 35.0 TO 35.9 IN ADULT, UNSPECIFIED OBESITY TYPE (H): ICD-10-CM

## 2023-04-03 DIAGNOSIS — E66.01 CLASS 2 SEVERE OBESITY WITH SERIOUS COMORBIDITY AND BODY MASS INDEX (BMI) OF 35.0 TO 35.9 IN ADULT, UNSPECIFIED OBESITY TYPE (H): ICD-10-CM

## 2023-04-04 ENCOUNTER — TELEPHONE (OUTPATIENT)
Dept: INTERNAL MEDICINE | Facility: CLINIC | Age: 48
End: 2023-04-04
Payer: COMMERCIAL

## 2023-04-04 NOTE — TELEPHONE ENCOUNTER
Patient Quality Outreach    Patient is due for the following:   Colon Cancer Screening    Next Steps:   Schedule a office visit for colonoscopy    Type of outreach:    Phone, left message for patient/parent to call back.    Next Steps:  Reach out within 90 days via [employer].    Max number of attempts reached: No. Will try again in 90 days if patient still on fail list.    Questions for provider review:    None     Kati Vitale

## 2023-04-06 RX ORDER — SEMAGLUTIDE 1.34 MG/ML
1 INJECTION, SOLUTION SUBCUTANEOUS WEEKLY
Qty: 3 ML | Refills: 0 | Status: SHIPPED | OUTPATIENT
Start: 2023-04-06 | End: 2023-08-02 | Stop reason: DRUGHIGH

## 2023-04-06 NOTE — TELEPHONE ENCOUNTER
Semaglutide, 1 MG/DOSE, (OZEMPIC, 1 MG/DOSE,) 4 MG/3ML pen      Last Written Prescription Date:  3/6/23  Last Fill Quantity: 3ml,   # refills: 0  Last Office Visit : 11/30/22  Future Office visit:  4/26/23      Creatinine   Date Value Ref Range Status   11/09/2022 0.67 0.52 - 1.04 mg/dL Final   10/07/2020 0.73 0.52 - 1.04 mg/dL Final

## 2023-04-15 NOTE — PATIENT INSTRUCTIONS
Nice to talk with you today in virtual clinic.    Please go to any [facility] lab. Follow standard safety precautions for COVID-19, practice social isolation, hand hygiene, do not touch your face, nose, or mouth, wear mask if have to go out in public to be respectful of community.  To find a lab location near you, please call [phone].      Food goal: go back to 1200 calories, food journal     Activity goal: Walking & talking at 7:30am daily x 5 days weekly x 30-60 minutes     Weight goal: weigh self 2x weekly and lose 1-2 lbs weekly  [employer] roberto ([employer] sent her a scale)     Medication goal: 1/2 tab phentermine in am    For constipation: 2 liters of water daily + 7 prunes daily. Please let one of your MDs know if this does not help.     Blood pressure goal: <130/80, continue to self-monitor     Pulse goal:      Structured Program goal: as per [email or web address]     FOLLOW-UP:    12 weeks.       Sincerely,     Dr. Umu Mahoney MD, MPH  Endocrinologist   no

## 2023-04-23 ENCOUNTER — HEALTH MAINTENANCE LETTER (OUTPATIENT)
Age: 48
End: 2023-04-23

## 2023-04-25 NOTE — PROGRESS NOTES
Virtual Visit Check-In    During this virtual visit the patient is located in [place], patient verifies this as the location during the entirety of this visit.     Holli is a 47 year old who is being evaluated via a billable video visit.      How would you like to obtain your AVS? MyChart  If the video visit is dropped, the invitation should be resent by: Text to cell phone: [phone]  Will anyone else be joining your video visit? No        Video-Visit Details    Type of service:  Video Visit     Originating Location (pt. Location): Home    Distant Location (provider location):  Off-site  Platform used for Video Visit: Compario         [facility] Medical Weight Management Note     Holli Saxena  MRN:  [id number]  :  1975  ALIA:  2023    Dear Freya Scales MD,    I had the pleasure of seeing your patient Holli Saxena. She is a 47 year old female who I am continuing to see for treatment of obesity related to:         View : No data to display.                Assessment & Plan   Problem List Items Addressed This Visit        Digestive    Class 2 severe obesity with serious comorbidity and body mass index (BMI) of 35.0 to 35.9 in adult (H)     Last seen 2022. Continued Ozempic 1.0mg once weekly. Has continue fluctuate around  250lbs since last visit.     Currently taking Ozempic 1.0mg once weekly. No side effects. Some help with hunger, however still feeling very hungry during the day. Discussed transitioning to Mounjaro, however was not covered by insurance due to A1C <7.0%. Increased Ozempic to 2.0mg once weekly.          Relevant Medications    tirzepatide (MOUNJARO) 5 MG/0.5ML pen       Endocrine    Type 2 diabetes mellitus (H) - Primary    Relevant Medications    tirzepatide (MOUNJARO) 5 MG/0.5ML pen      1. Activity goal - walking 2xweek for 20min   2. Food goal - focus on increasing protein   3. Mounjaro was denied by insurance due to A1C being <7%. Will increase Ozempic to 2.0mg  once weekly.   4. Follow up with Tsering Grijalva in 3 moths     INTERVAL HISTORY:  First seen by Dr. Mahoney - 7/2010  Starting weight - 225lb  Started Metformin - stopped  Started Topiramate - discontinued due to side effect of fatigue   Started Phentermine - discontinued due to pregnancy   Weight gain due to pregnancy - 2018   Weight post pregnancy - 264lb  Started Contrave - stopped due to no longer helping  Restarted phentermine and topiramate  - discontinued during pandemic  Ozempic 1mg      Anti-obesity medications:     Current:   Ozempic 1.0mg - no side effects. Still helpful with hunger.       Recent diet changes: Continued to intermittent fast. Fasting for 16hours, eating 8hours. During that time has increased hunger and sugar cravings. Feels better during the fast. Eating 2 meals a day, with no snacks. Breakfast - toast + jam, trying to start eating eggs, but harder when in the office Tuesday and Wednesday.  Dinner - chicken nuggets, pizza, pasta.     Recent exercise/activity changes: Harder through the winter. Walking with sun coming out. Goal to walk 30min 5xweek.     Recent stressors:  Work has been stressful. Has a training in [employer], so a little nervous about flying. Trying to disconnect from the anxiety of the day.     Recent sleep changes: Sleeping well.       CURRENT WEIGHT:   249 lbs 3.2 oz    Initial Weight (lbs): 225 lbs  Last Visits Weight: 112.2 kg (247 lb 4.8 oz)  Cumulative weight loss (lbs): -24.2  Weight Loss Percentage: -10.76%     Wt Readings from Last 5 Encounters:   04/26/23 113 kg (249 lb 3.2 oz)   11/30/22 112.2 kg (247 lb 4.8 oz)   06/17/22 113.2 kg (249 lb 9.6 oz)   06/08/22 113.6 kg (250 lb 6.4 oz)   05/03/22 114.4 kg (252 lb 4.8 oz)             11/29/2022    10:25 AM   Changes and Difficulties   I have made the following changes to my diet since my last visit: Intermittent fasting 16/8         MEDICATIONS:   Current Outpatient Medications   Medication Sig Dispense Refill      "tirzepatide (MOUNJARO) 5 MG/0.5ML pen Inject 5 mg Subcutaneous every 7 days 2 mL 3     Flaxseed, Linseed, (FLAXSEED OIL PO) Take as directed       lisinopril (ZESTRIL) 20 MG tablet TAKE 1 TABLET BY MOUTH EVERY DAY 90 tablet 1     LORazepam (ATIVAN) 0.5 MG tablet Take 1 tablet (0.5 mg) by mouth 2 times daily as needed for anxiety 20 tablet 0     Multiple Vitamin (MULTIVITAMINS PO) Take 1 tablet by mouth daily.       NIFEdipine ER OSMOTIC (PROCARDIA XL) 30 MG 24 hr tablet Take 1 tablet (30 mg) by mouth daily 90 tablet 0     Semaglutide, 1 MG/DOSE, (OZEMPIC, 1 MG/DOSE,) 4 MG/3ML pen Inject 1 mg Subcutaneous once a week 3 mL 0     Semaglutide, 2 MG/DOSE, (OZEMPIC) 8 MG/3ML pen Inject 2 mg Subcutaneous every 7 days 9 mL 3           4/24/2023     7:28 AM   Weight Loss Medication History Reviewed With Patient   Which weight loss medications are you currently taking on a regular basis? Ozempic           Objective    Ht 1.778 m (5' 10\")   Wt 113 kg (249 lb 3.2 oz)   BMI 35.76 kg/m             Vitals:  No vitals were obtained today due to virtual visit.    PHYSICAL EXAM:  GENERAL: Healthy, alert and no distress  EYES: Eyes grossly normal to inspection.  No discharge or erythema, or obvious scleral/conjunctival abnormalities.  RESP: No audible wheeze, cough, or visible cyanosis.  No visible retractions or increased work of breathing.    SKIN: Visible skin clear. No significant rash, abnormal pigmentation or lesions.  NEURO: Cranial nerves grossly intact.  Mentation and speech appropriate for age.  PSYCH: Mentation appears normal, affect normal/bright, judgement and insight intact, normal speech and appearance well-groomed.        Sincerely,    TALYA JORDAN PA-C      30 minutes spent by me on the date of the encounter doing chart review, history and exam, documentation and further activities per the note        "

## 2023-04-26 ENCOUNTER — TELEPHONE (OUTPATIENT)
Dept: ENDOCRINOLOGY | Facility: CLINIC | Age: 48
End: 2023-04-26

## 2023-04-26 ENCOUNTER — VIRTUAL VISIT (OUTPATIENT)
Dept: ENDOCRINOLOGY | Facility: CLINIC | Age: 48
End: 2023-04-26
Payer: COMMERCIAL

## 2023-04-26 VITALS — BODY MASS INDEX: 35.68 KG/M2 | WEIGHT: 249.2 LBS | HEIGHT: 70 IN

## 2023-04-26 DIAGNOSIS — E66.812 CLASS 2 SEVERE OBESITY WITH SERIOUS COMORBIDITY AND BODY MASS INDEX (BMI) OF 35.0 TO 35.9 IN ADULT, UNSPECIFIED OBESITY TYPE (H): ICD-10-CM

## 2023-04-26 DIAGNOSIS — E66.01 CLASS 2 SEVERE OBESITY WITH SERIOUS COMORBIDITY AND BODY MASS INDEX (BMI) OF 35.0 TO 35.9 IN ADULT, UNSPECIFIED OBESITY TYPE (H): ICD-10-CM

## 2023-04-26 DIAGNOSIS — E11.9 TYPE 2 DIABETES MELLITUS WITHOUT COMPLICATION, WITHOUT LONG-TERM CURRENT USE OF INSULIN (H): Primary | ICD-10-CM

## 2023-04-26 PROCEDURE — 99214 OFFICE O/P EST MOD 30 MIN: CPT | Mod: VID

## 2023-04-26 RX ORDER — TIRZEPATIDE 5 MG/.5ML
5 INJECTION, SOLUTION SUBCUTANEOUS
Qty: 2 ML | Refills: 3 | Status: SHIPPED | OUTPATIENT
Start: 2023-04-26 | End: 2023-08-02

## 2023-04-26 RX ORDER — TIRZEPATIDE 7.5 MG/.5ML
7.5 INJECTION, SOLUTION SUBCUTANEOUS
Qty: 2 ML | Refills: 3 | Status: CANCELLED | OUTPATIENT
Start: 2023-04-26

## 2023-04-26 NOTE — TELEPHONE ENCOUNTER
PA Initiation    Medication: Mounjaro  Insurance Company: [employer] - Phone [phone] Fax [phone]  Pharmacy Filling the Rx:    Filling Pharmacy Phone:    Filling Pharmacy Fax:    Start Date: 4/26/2023    Key:[id number]

## 2023-04-26 NOTE — NURSING NOTE
Chief Complaint   Patient presents with     Follow Up     Return weight management.         Vitals:    04/26/23 0921   Weight: 249 lb 3.2 oz       Body mass index is 35.76 kg/m .      Remi Sam, EMT  Surgery Clinic

## 2023-04-26 NOTE — LETTER
2023       RE: Holli Saxena  [address]  [facility] [address]     Dear Colleague,    Thank you for referring your patient, Holli Saxena, to the [facility] WEIGHT MANAGEMENT CLINIC [place] [facility]. Please see a copy of my visit note below.    Virtual Visit Check-In    During this virtual visit the patient is located in [place], patient verifies this as the location during the entirety of this visit.     Holli is a 47 year old who is being evaluated via a billable video visit.      How would you like to obtain your AVS? MyChart  If the video visit is dropped, the invitation should be resent by: Text to cell phone: [phone]  Will anyone else be joining your video visit? No        Video-Visit Details    Type of service:  Video Visit     Originating Location (pt. Location): Home    Distant Location (provider location):  Off-site  Platform used for Video Visit: [facility] Medical Weight Management Note     Holli Saxena  MRN:  [id number]  :  1975  ALIA:  2023    Dear Freya Scales MD,    I had the pleasure of seeing your patient Holli Saxena. She is a 47 year old female who I am continuing to see for treatment of obesity related to:         View : No data to display.                Assessment & Plan   Problem List Items Addressed This Visit          Digestive    Class 2 severe obesity with serious comorbidity and body mass index (BMI) of 35.0 to 35.9 in adult (H)     Last seen 2022. Continued Ozempic 1.0mg once weekly. Has continue fluctuate around  250lbs since last visit.     Currently taking Ozempic 1.0mg once weekly. No side effects. Some help with hunger, however still feeling very hungry during the day. Discussed transitioning to Mounjaro, however was not covered by insurance due to A1C <7.0%. Increased Ozempic to 2.0mg once weekly.          Relevant Medications    tirzepatide  (MOUNJARO) 5 MG/0.5ML pen       Endocrine    Type 2 diabetes mellitus (H) - Primary    Relevant Medications    tirzepatide (MOUNJARO) 5 MG/0.5ML pen      Activity goal - walking 2xweek for 20min   Food goal - focus on increasing protein   Mounjaro was denied by insurance due to A1C being <7%. Will increase Ozempic to 2.0mg once weekly.   Follow up with Tsering Grijalva in 3 moths     INTERVAL HISTORY:  First seen by Dr. Mahoney - 7/2010  Starting weight - 225lb  Started Metformin - stopped  Started Topiramate - discontinued due to side effect of fatigue   Started Phentermine - discontinued due to pregnancy   Weight gain due to pregnancy - 2018   Weight post pregnancy - 264lb  Started Contrave - stopped due to no longer helping  Restarted phentermine and topiramate  - discontinued during pandemic  Ozempic 1mg      Anti-obesity medications:     Current:   Ozempic 1.0mg - no side effects. Still helpful with hunger.       Recent diet changes: Continued to intermittent fast. Fasting for 16hours, eating 8hours. During that time has increased hunger and sugar cravings. Feels better during the fast. Eating 2 meals a day, with no snacks. Breakfast - toast + jam, trying to start eating eggs, but harder when in the office Tuesday and Wednesday.  Dinner - chicken nuggets, pizza, pasta.     Recent exercise/activity changes: Harder through the winter. Walking with sun coming out. Goal to walk 30min 5xweek.     Recent stressors:  Work has been stressful. [place], so [place] little nervous about flying. Trying to disconnect from the anxiety of the day.     Recent sleep changes: Sleeping well.       CURRENT WEIGHT:   249 lbs 3.2 oz    Initial Weight (lbs): 225 lbs  Last Visits Weight: 112.2 kg (247 lb 4.8 oz)  Cumulative weight loss (lbs): -24.2  Weight Loss Percentage: -10.76%     Wt Readings from Last 5 Encounters:   04/26/23 113 kg (249 lb 3.2 oz)   11/30/22 112.2 kg (247 lb 4.8 oz)   06/17/22 113.2 kg (249 lb 9.6 oz)  "  06/08/22 113.6 kg (250 lb 6.4 oz)   05/03/22 114.4 kg (252 lb 4.8 oz)             11/29/2022    10:25 AM   Changes and Difficulties   I have made the following changes to my diet since my last visit: Intermittent fasting 16/8         MEDICATIONS:   Current Outpatient Medications   Medication Sig Dispense Refill    tirzepatide (MOUNJARO) 5 MG/0.5ML pen Inject 5 mg Subcutaneous every 7 days 2 mL 3    Flaxseed, Linseed, (FLAXSEED OIL PO) Take as directed      lisinopril (ZESTRIL) 20 MG tablet TAKE 1 TABLET BY MOUTH EVERY DAY 90 tablet 1    LORazepam (ATIVAN) 0.5 MG tablet Take 1 tablet (0.5 mg) by mouth 2 times daily as needed for anxiety 20 tablet 0    Multiple Vitamin (MULTIVITAMINS PO) Take 1 tablet by mouth daily.      NIFEdipine ER OSMOTIC (PROCARDIA XL) 30 MG 24 hr tablet Take 1 tablet (30 mg) by mouth daily 90 tablet 0    Semaglutide, 1 MG/DOSE, (OZEMPIC, 1 MG/DOSE,) 4 MG/3ML pen Inject 1 mg Subcutaneous once a week 3 mL 0    Semaglutide, 2 MG/DOSE, (OZEMPIC) 8 MG/3ML pen Inject 2 mg Subcutaneous every 7 days 9 mL 3           4/24/2023     7:28 AM   Weight Loss Medication History Reviewed With Patient   Which weight loss medications are you currently taking on a regular basis? Ozempic           Objective    Ht 1.778 m (5' 10\")   Wt 113 kg (249 lb 3.2 oz)   BMI 35.76 kg/m             Vitals:  No vitals were obtained today due to virtual visit.    PHYSICAL EXAM:  GENERAL: Healthy, alert and no distress  EYES: Eyes grossly normal to inspection.  No discharge or erythema, or obvious scleral/conjunctival abnormalities.  RESP: No audible wheeze, cough, or visible cyanosis.  No visible retractions or increased work of breathing.    SKIN: Visible skin clear. No significant rash, abnormal pigmentation or lesions.  NEURO: Cranial nerves grossly intact.  Mentation and speech appropriate for age.  PSYCH: Mentation appears normal, affect normal/bright, judgement and insight intact, normal speech and appearance " well-groomed.        Sincerely,    TALYA JORDAN PA-C      30 minutes spent by me on the date of the encounter doing chart review, history and exam, documentation and further activities per the note

## 2023-05-01 ENCOUNTER — MYC MEDICAL ADVICE (OUTPATIENT)
Dept: ENDOCRINOLOGY | Facility: CLINIC | Age: 48
End: 2023-05-01
Payer: COMMERCIAL

## 2023-05-01 DIAGNOSIS — E66.812 CLASS 2 SEVERE OBESITY WITH SERIOUS COMORBIDITY AND BODY MASS INDEX (BMI) OF 35.0 TO 35.9 IN ADULT, UNSPECIFIED OBESITY TYPE (H): Primary | ICD-10-CM

## 2023-05-01 DIAGNOSIS — E66.01 CLASS 2 SEVERE OBESITY WITH SERIOUS COMORBIDITY AND BODY MASS INDEX (BMI) OF 35.0 TO 35.9 IN ADULT, UNSPECIFIED OBESITY TYPE (H): Primary | ICD-10-CM

## 2023-05-01 DIAGNOSIS — E66.811 CLASS 1 OBESITY WITH SERIOUS COMORBIDITY AND BODY MASS INDEX (BMI) OF 34.0 TO 34.9 IN ADULT, UNSPECIFIED OBESITY TYPE: ICD-10-CM

## 2023-05-01 NOTE — TELEPHONE ENCOUNTER
PRIOR AUTHORIZATION DENIED    Medication: Mounjaro    Denial Date: 4/27/2023    Denial Rational:     Appeal Information:

## 2023-05-01 NOTE — TELEPHONE ENCOUNTER
Mounjaro denied, insurance will only cover this medication if the patient has an A1C  Greater than 7%. Please advise on how you would like to proceed. If appealed please provide medical rational.

## 2023-05-03 NOTE — ASSESSMENT & PLAN NOTE
Last seen 11/2022. Continued Ozempic 1.0mg once weekly. Has continue fluctuate around  250lbs since last visit.     Currently taking Ozempic 1.0mg once weekly. No side effects. Some help with hunger, however still feeling very hungry during the day. Discussed transitioning to Mounjaro, however was not covered by insurance due to A1C <7.0%. Increased Ozempic to 2.0mg once weekly.

## 2023-05-03 NOTE — PATIENT INSTRUCTIONS
"Thank you for allowing us the privilege of caring for you. We hope we provided you with the excellent service you deserve.   Please let us know if there is anything else we can do for you so that we can be sure you are completely satisfied with your care experience.    To ensure the quality of our services you may be receiving a patient satisfaction survey from an independent patient satisfaction monitoring company.    The greatest compliment you can give is a \"Likely to Recommend\"    Your visit was with TALYA JORDAN PA-C today.    Instructions per today's visit:   Activity goal - walking 2xweek for 20min   Food goal - focus on increasing protein   Mounjaro was denied by insurance due to A1C being <7%. Will increase Ozempic to 2.0mg once weekly.   Follow up with Talya Grijalva in 3 moths     _________________________________________________________________________________________________________________________________________________________  Important contact and scheduling information:  Please call our contact center at [phone] to schedule your next appointments.  To find a lab location near you, please call [phone].  For any nursing questions or concerns call Zaynab Mora LPN at [phone] or Lissy Davis RN at [phone]  Please call during clinic hours Monday through Friday 8:00a - 4:00p if you have questions or you can contact us via XenoOnet at anytime and we will reply during clinic hours.    Lab results will be communicated through My Chart or letter (if My Chart not used). Please call the clinic if you have not received communication after 1 week or if you have any questions.?  Clinic Fax: [phone]  _________________________________________________________________________________________________________________________________________________________  If you are asked by your clinic team to have your blood pressure checked:  [facility] do offer several locations for blood pressure " checks. Please follow the below link to schedule an appointment. Scheduling an appointment at the pharmacy for a blood pressure check is now preferred.    Appointment Plus ([email or web address])  _________________________________________________________________________________________________________________________________________________________  Meal Replacement Products:    Here is the link to our new e-store where you can purchase our meal replacement products     [employer] E-Store  [email or web address]    The one week starter kit is a great way to sample a variety of products and see what works for you.    If you want more information about the product go to: Fresh Steps Meals  Foodista.CV-Sight    If you are an employee or [facility] or [facility] please contact your care team for a 10% estore discount    Free Shipping for orders over $75     Benefits of meal replacements products:    Portion and calorie control  Improved nutrition  Structured eating  Simplified food choices  Avoid contact with trigger foods  _________________________________________________________________________________________________________________________________________________________  Interested in working with a health ?  Health coaches work with you to improve your overall health and wellbeing.  They look at the whole person, and may involve discussion of different areas of life, including, but not limited to the four pillars of health (sleep, exercise, nutrition, and stress management). Discuss with your care team if you would like to start working a health .  Health Coaching-3 Pack: Schedule by calling [phone]    $99 for three health coaching visits    Visits may be done in person or via phone    Coaching is a partnership between the  and the client; Coaches do not prescribe or diagnose    Coaching helps inspire the client to reach his/her personal goals    _________________________________________________________________________________________________________________________________________________________  24 Week Healthy Lifestyle Plan:    Our mission in the 24-week Healthy Lifestyle Plan is to provide you with individualized care by giving you the tools, education and support you need to lose weight and maintain a healthy lifestyle. In your 24-week journey, you ll be supported by a dedicated weight loss team that includes registered dietitians, medical weight management providers, health coaches, and nurses -- all with special expertise in weight loss -- to help you every step of the way.     Monthly meetings with your registered dietician or medical weight management provider help to review your progress, update your care plan, and make any adjustments needed to ensure success. Between these visits, weekly and bi-weekly health  visits will help you focus on the four pillars of weight loss -- stress, sleep, nutrition, and exercise -- and how you can best adapt each to achieve sustainable weight loss results.    In addition, you will be given exclusive access to online wellbeing classes through [employer].  Your initial visit will be with a medical weight management provider who will help to understand your weight loss goals and ensure this program is the right fit for you. Please let our team know if you are interested in the 24 week plan by sending a message to your care team or calling [phone] to schedule.  _________________________________________________________________________________________________________________________________________________________    COMPREHENSIVE WEIGHT MANAGEMENT PROGRAM  VIRTUAL SUPPORT GROUPS    For Support Group Information:      We offer support groups for patients who are working on weight loss and considering, preparing for or have had weight loss surgery.   There is no cost for this opportunity.  You are invited to  "attend the?Virtual Support Groups?provided by any of the following locations:    [facility] via Neverware Teams with Greta Soto RN  2.   [place] via Neverware Teams with Ridge Cummings, PhD, LP  3.   [place] via Neverware Teams with Citlali Ash RN  4.   [facility] via Neverware Teams with Citlali Armenta [facility]    The following Support Group information can also be found on our website:  [email or web address]    [facility] Weight Loss Surgery Support Group    [facility] Weight Loss Surgery Support Group  The support group is a patient-lead forum that meets monthly to share experiences, encouragement and education. It is open to those who have had weight loss surgery, are scheduled for surgery, and those who are considering surgery.   WHEN: This group meets on the 3rd Wednesday of each month from 5:00PM - 6:00PM virtually using Microsoft Teams.   FACILITATOR: Led by Greta Soto RD, GEOVANNY, RN, the program's Clinical Coordinator.   TO REGISTER: Please contact the clinic via Zet Universe or call the nurse line directly at [phone] to inform our staff that you would like an invite sent to you and to let us know the email you would like the invite sent to. Prior to the meeting, a link with directions on how to join the meeting will be sent to you.    2022 Meetings  Komal 15: \"Let's Talk\" a time for the group to share.  July 20: \"Let's Talk\" a time for the group to share.  August 17: \"Let's Talk\" a time for the group to share.  September 21: \"Let's Talk\" a time for the group to share.  October 19: Guest Speaker: Dr Ney Rush MD Pulmonologist and Sleep Medicine Physician, \"Getting a Good Night's Sleep\".  November 16: \"Let's Talk\" a time for the group to share.  December 21: \"Let's Talk\" a time for the group to share.    [facility] and [facility] Support Groups    Connections: Bariatric Care Support " "Group?  This is open to all [facility] (and those external to this program) pre- and post- operative bariatric surgery patients as well as their support system.   WHEN: This group meets the 2nd Tuesday of each month from 6:30 PM - 8:00 PM virtually using Microsoft Teams.   FACILITATOR: Led by Ridge Cummings, Ph.D who is a Licensed Psychologist with the [facility] Comprehensive Weight Management Program.   TO REGISTER: Please send an email to Ridge Cummings, Ph.D.,  at?[email or web address]?if you would like an invitation to the group and to learn about using Microsoft Teams.    2022 Meetings June 14: Viyr Valentin, ADDI, LD at [facility], \"Nutritional Labeling\"  July 12 August 2 (Please Note Date Change)  September 13 October 11 November 8 December 13    Connections: Post-Operative Bariatric Surgery Support Group  This is a support group for [facility] bariatric patients (and those external to [facility]) who have had bariatric surgery and are at least 3 months post-surgery.  WHEN: This support group meets the 4th Wednesday of the month from 11:00 AM - 12:00 PM virtually using Microsoft Teams.   FACILITATOR: Led by Certified Bariatric Nurse, Citlali Ash RN.   TO REGISTER: Please send an email to Citlali at [email or web address] if you would like an invitation to the group and to learn about using Microsoft Teams.    2022 Meetings June 22 July 27 August 24 September 28 October 26 November 23 December 28      [facility] Healthy Lifestyle Virtual Support Group    Healthy Lifestyle Virtual Support Group?  This is 60 minutes of small group guided discussion, support and resources. All are welcome who want a healthy lifestyle.  WHEN: This group meets monthly on a Friday from 12:30 PM - 1:30 PM virtually using Microsoft Teams.   FACILITATOR: Led by National Board Certified Health and , MICAH [facility].   TO " "REGISTER: Please send an email to Citlali [email or web address] to receive monthly invites to the group or if you have any questions about having a health .  Prior to the meeting, a link with directions on how to join the meeting will be sent to you.    2022 Meetings  June 24: Citlali Armenta, [facility], \"Setting Limits and Boundaries\".  Jul 29: Open Forum  August 26: Guest Speaker: Gretel Daiz, Registered Dietitian  September 30: Open Forum  October 28th: Guest Speaker: Mariely Jorgensen, [facility], Health , \"Gratitude Practices\".  November 18: Guest Speaker: Rody Rolle RD Registered Dietitian, \"Navigating How to Eat around the Holidays\".  December 16: Guest Speaker: Nyasia Daniel, [facility], \"Changing Your Relationship with Movement\".    ____________________________________________________________________________________________________________________________________________________________________________  [facility] Get Moving Program  Our team of physical therapists is trained to help you understand and take control of your condition. They will perform a thorough evaluation to determine your ability for activity and develop a customized plan to fit your goals and physical ability.  Scheduling: Unsure if the Get Moving program is right for you? Discuss the program with your medical provider or diabetes educator. You can also call us at [phone] to ask questions or schedule an appointment.   [facility] Get Moving Program  ____________________________________________________________________________________________________________________________________________________________________________  [facility] Diabetes Prevention Program (DPP)  If you have prediabetes and Medicare please contact us via Project 10Khart to learn more about the Diabetes Prevention Program (DPP)  Program Details:   [employer] [place] offers the year-long Diabetes Prevention Program (DPP). The program helps you to make " lifestyle changes that prevent or delay type 2 diabetes by supporting healthy eating, increased physical activity, stress reduction and use of coping skills.   On average, previous [facility] DPP cohorts have lost and maintained at least 5% of their starting weight throughout the program and averaged more than 150 minutes of physical activity per week.  Participants meet weekly for one-hour group sessions over sixteen weeks, every other week for the next 8 weeks, and monthly for the last six months.   A year-long maintenance program is also available for participants who complete the first year.   Location & Cost:   During the COVID-19 Public Health Emergency, the program is offered virtually. When in-person classes can resume, they will be held at [facility].  For people with Medicare, the program is covered in full. A self-pay option will also be available for those with non-Medicare insurance plans.   _________________________________________________________________________________________________________________________________________________________  Bluetooth Scale:    We hope to provide you with high quality virtual healthcare visits while social distancing for COVID-19 is necessary, as well as in the future when virtual visits may be more convenient for you.     Our technology team made it possible for Bluetooth scales to send weight measurements to our electronic medical record. This allows weights from you weighing at home to securely flow into the medical record, which will improve telephone and virtual visits.   Additionally, studies have shown that adults actually lose more weight when their weights are automatically sent to someone else, and also that this process is not stressful for those adults.    Below is a link for purchasing the scale, with a discount code for our patients. You may call your insurance company to see if they will reimburse you for the cost  of the scale, as a piece of durable medical equipment. The scales only go up to a weight of 400 pounds. This is an issue and we are working with the developer on increasing this. We found no scales that go over 400lb that have blue-tooth for connecting to [employer].    Scale to purchase: the [employer]  Body  Scale: https://[email or web address]/body/shop?[email or web address]=aw.ds    Discount Code: We have a discount code for our patients to bring the cost down to $50, Discount code is: [place]_Scale_20%off  _______________________________________________________________________________________________________________________________________________________________________________    To work with a Behavioral Health Psychologist:    Call to schedule:    Jorge Torres - [phone]  Mayra Garner - [phone]  Kasia Del Toro - [phone]  Bibi Leach - [phone]   Leti Wells PhD (cannot accept Medicare) [phone]        Thank you,   [facility] Comprehensive Weight Management Team

## 2023-07-22 ENCOUNTER — HEALTH MAINTENANCE LETTER (OUTPATIENT)
Age: 48
End: 2023-07-22

## 2023-08-02 ENCOUNTER — VIRTUAL VISIT (OUTPATIENT)
Dept: ENDOCRINOLOGY | Facility: CLINIC | Age: 48
End: 2023-08-02
Payer: COMMERCIAL

## 2023-08-02 VITALS — WEIGHT: 244 LBS | HEIGHT: 70 IN | BODY MASS INDEX: 34.93 KG/M2

## 2023-08-02 DIAGNOSIS — E66.812 CLASS 2 SEVERE OBESITY WITH SERIOUS COMORBIDITY AND BODY MASS INDEX (BMI) OF 35.0 TO 35.9 IN ADULT, UNSPECIFIED OBESITY TYPE (H): Primary | ICD-10-CM

## 2023-08-02 DIAGNOSIS — E66.01 CLASS 2 SEVERE OBESITY WITH SERIOUS COMORBIDITY AND BODY MASS INDEX (BMI) OF 35.0 TO 35.9 IN ADULT, UNSPECIFIED OBESITY TYPE (H): Primary | ICD-10-CM

## 2023-08-02 PROCEDURE — 99213 OFFICE O/P EST LOW 20 MIN: CPT | Mod: 95

## 2023-08-02 ASSESSMENT — PAIN SCALES - GENERAL: PAINLEVEL: NO PAIN (0)

## 2023-08-02 NOTE — PROGRESS NOTES
"Virtual Visit Details    Type of service:  Video Visit     Originating Location (pt. Location): {video visit patient location:867377::\"Home\"}  {PROVIDER LOCATION On-site should be selected for visits conducted from your clinic location or adjoining [facility] hospital, academic office, or other nearby [facility] building. Off-site should be selected for all other provider locations, including home:716415}  Distant Location (provider location):  {virtual location provider:676401}  Platform used for Video Visit: {Virtual Visit Platforms:665022::\"VoloMedia\"}    "

## 2023-08-02 NOTE — PATIENT INSTRUCTIONS
"Mac De La Garza,   Thank you for allowing us the privilege of caring for you. We hope we provided you with the excellent service you deserve.   Please let us know if there is anything else we can do for you so that we can be sure you are completely satisfied with your care experience.    To ensure the quality of our services you may be receiving a patient satisfaction survey from an independent patient satisfaction monitoring company.    The greatest compliment you can give is a \"Likely to Recommend\"    Your visit was with TALYA JORDAN PA-C today.    Instructions per today's visit:     Continue on Ozempic 2.0mg once weekly. No refills needed   Activity goal - walking 3xweek for 20min   Food goal - add fruit or vegetable 1xday   Follow up with Talya Grijalva in 3 months     ___________________________________________________________________________  Important contact and scheduling information:  Please call our contact center at [phone] to schedule your next appointments.  For any nursing questions or concerns call Zaynab Mora LPN at [phone] or Lissy Davis RN at [phone]  Please call during clinic hours Monday through Friday 8:00a - 4:00p if you have questions or you can contact us via [employer] at anytime and we will reply during clinic hours.    Lab results will be communicated through My Chart or letter (if My Chart not used). Please call the clinic if you have not received communication after 1 week or if you have any questions.?  Clinic Fax: [phone]  __________________________________________________________________________    If labs were ordered today:    Please make an appointment to have them drawn at your convenience.     To schedule the Lab Appointment using [employer]:  Select \"Schedule an Appointment\"  Select \"Lab Only\"  For \"A couple of questions\", select \"Other\"  For \"Which locations work for you?, select the location and set up the appointment    To schedule by phone call [phone] to " schedule a lab only appointment at any [facility] lab.  ___________________________________________________________________________  Work with A Health !  Virtual Sessions are Available through [facility] Weight Management Clinics    To learn more, call to schedule a free, Health  Q&A appointment: [phone]     What is Health Coaching?  Do you know what you are supposed to do, but you just aren't doing it?  Then, HEALTH COACHING may help you!   Get unstuck and move forward with the support of a professionally trained NBC-HWC (National Board-Certified Health and ) who uses evidence-based approaches to help you move forward with healthy lifestyle changes in the areas of weight loss, stress management and overall well-being.    Health Coaches help you identify goals that will work best for you. Health Coaches provide support and encouragement with overcoming barriers and help you to find inspiration and motivation to lead a healthy lifestyle.    Option one:  Health Coaching 3-Pack; Three, 30-minute Health Coaching Visits, for $99  Visits are done virtually (phone or video)  This is a self pay service; we do not accept insurance for dwayne coaching.    Option two:   The 24 week Plan; 11 Health Coaching Visits, and a 7 months subscription to [employer]-- on-demand fitness, nutrition and mindfulness classes, for $499 (employee discounts may be available). Participants will also meet regularly with a weight management Medical Provider and a Registered/Licensed Dietician.  This is a self-pay service; we do not accept insurance for health coaching.    To Schedule a free Health  Q&A appointment to learn more,  call [phone].  ____________________________________________________________________    [facility]   Healthy Lifestyle Virtual Support Group    Healthy Lifestyle Virtual Support Group?  This is 60 minutes of small group guided  discussion, support and resources. All are welcome who want a healthy lifestyle.  WHEN: Starting in July 2023, this group meets the 1st Friday of the month from 12:30 PM - 1:30 PM virtually using Microsoft Teams.    FACILITATOR: Led by National Board Certified Health and , Citlali Armenta [facility].   TO REGISTER: Please send an email to Citlali [email or web address] to receive monthly invites to the group or if you have any questions about having a health .  Prior to the meeting, a link with directions on how to join the meeting will be sent to you.    2023 Meetings  May 19: Let's Talk  June 9: Create Your Coaching Toolkit: Learn How to  Yourself  July 7: Let's Talk  August 4: Benefits of Fiber with ADDI Fernandes  September 1: Show and Tell (share your aps, podcasts, recipes, hacks, books)  October 6 :Let's Talk  November 3: Introduction to Mindfulness   December 1: Let's Talk    If you would like bariatric surgery specific support group info please let your care team know.         Thank you,   [facility] Comprehensive Weight Management Team

## 2023-08-02 NOTE — NURSING NOTE
Is the patient currently in the state of [place]? YES    Visit mode:VIDEO    If the visit is dropped, the patient can be reconnected by: VIDEO VISIT: Text to cell phone: [phone]    Will anyone else be joining the visit? NO      How would you like to obtain your AVS? MyChart    Are changes needed to the allergy or medication list? NO    Reason for visit: Follow Up

## 2023-08-02 NOTE — LETTER
2023       RE: Holli Saxena  [address]  [place] [address]     Dear Colleague,    Thank you for referring your patient, Holli Saxena, to the [facility]NA[facility] [place] at [facility]. Please see a copy of my visit note below.    Virtual Visit Details    Type of service:  Video Visit     Originating Location (pt. Location): Home    Distant Location (provider location):  Off-site  Platform used for Video Visit: [facility] Medical Weight Management Note     Holli Saxena  MRN:  [id number]  :  1975  ALIA:  2023    Dear Freya Scales MD,    I had the pleasure of seeing your patient Holli Saxena. She is a 48 year old female who I am continuing to see for treatment of obesity      Assessment & Plan  Problem List Items Addressed This Visit          Digestive    Class 2 severe obesity with serious comorbidity and body mass index (BMI) of 35.0 to 35.9 in adult (H) - Primary      Continue on Ozempic 2.0mg once weekly. No refills needed   Activity goal - walking 3xweek for 20min   Food goal - add fruit or vegetable 1xday   Follow up with Tsering Grijalva in 3 months     INTERVAL HISTORY:  First seen by Dr. Mahoney - 2010  Starting weight - 225lb  Started Metformin - stopped  Started Topiramate - discontinued due to side effect of fatigue   Started Phentermine - discontinued due to pregnancy   Weight gain due to pregnancy in 2018 - 264lbs  Started Contrave - stopped due to no longer helping  Restarted phentermine and topiramate  - discontinued during pandemic  Started Ozempic for type II diabetes   Last seen 23 - Mounjaro denied due to A1C <7%, increased Ozempic 2.0mg     Feeling very encouraged by results. Has lost 20lbs since her pregnancy in 2018.     Anti-obesity medications:     Current:   Ozempic 2.0mg - has been on this dose for 3 months. No side effects. Has been helpful with hunger.        Recent diet changes: Eating 2 meals a day. Continuing to intermittent fast - fasting for 16hours. Snacks on nuts during the day. Trying to increase fruits and vegetables. Has been eating out more due to being more stressed. Has been focusing on protein - increase in chicken.     Recent exercise/activity changes: Has been reaching activity goal - 2xweek for 20min. Has been walking.     Recent stressors: Increase stress with work, so feeling really good about losing weight during this season. Presenting next week, so hoping to be less stressed starting next week.     Recent sleep changes: Stable     Vitamins/Labs: To be collected at PCP visit in September     CURRENT WEIGHT:   244 lbs 0 oz    Initial Weight (lbs): 225 lbs  Last Visits Weight: 113 kg (249 lb 3.2 oz)  Cumulative weight loss (lbs): -19  Weight Loss Percentage: -8.44%    Wt Readings from Last 5 Encounters:   08/02/23 110.7 kg (244 lb)   04/26/23 113 kg (249 lb 3.2 oz)   11/30/22 112.2 kg (247 lb 4.8 oz)   06/17/22 113.2 kg (249 lb 9.6 oz)   06/08/22 113.6 kg (250 lb 6.4 oz)             7/26/2023     2:25 PM   Changes and Difficulties   I have made the following changes to my activity/exercise since my last visit: 20minutes activity twice a week         MEDICATIONS:   Current Outpatient Medications   Medication Sig Dispense Refill    lisinopril (ZESTRIL) 20 MG tablet TAKE 1 TABLET BY MOUTH EVERY DAY 90 tablet 1    LORazepam (ATIVAN) 0.5 MG tablet Take 1 tablet (0.5 mg) by mouth 2 times daily as needed for anxiety 20 tablet 0    NIFEdipine ER OSMOTIC (PROCARDIA XL) 30 MG 24 hr tablet Take 1 tablet (30 mg) by mouth daily 90 tablet 0    Semaglutide, 2 MG/DOSE, (OZEMPIC) 8 MG/3ML pen Inject 2 mg Subcutaneous every 7 days 9 mL 3    Flaxseed, Linseed, (FLAXSEED OIL PO) Take as directed      Multiple Vitamin (MULTIVITAMINS PO) Take 1 tablet by mouth daily.             7/26/2023     2:25 PM   Weight Loss Medication History Reviewed With Patient   Which  "weight loss medications are you currently taking on a regular basis? Ozempic   Are you having any side effects from the weight loss medication that we have prescribed you? No             Objective   Ht 1.778 m (5' 10\")   Wt 110.7 kg (244 lb)   BMI 35.01 kg/m      Vitals - Patient Reported  Pain Score: No Pain (0)      Vitals:  No vitals were obtained today due to virtual visit.    PHYSICAL EXAM:  GENERAL: Healthy, alert and no distress  EYES: Eyes grossly normal to inspection.  No discharge or erythema, or obvious scleral/conjunctival abnormalities.  RESP: No audible wheeze, cough, or visible cyanosis.  No visible retractions or increased work of breathing.    SKIN: Visible skin clear. No significant rash, abnormal pigmentation or lesions.  NEURO: Cranial nerves grossly intact.  Mentation and speech appropriate for age.  PSYCH: Mentation appears normal, affect normal/bright, judgement and insight intact, normal speech and appearance well-groomed.        Sincerely,    TALYA JORDAN PA-C      24 minutes spent by me on the date of the encounter doing chart review, history and exam, documentation and further activities per the note    "

## 2023-08-02 NOTE — PROGRESS NOTES
Virtual Visit Details    Type of service:  Video Visit     Originating Location (pt. Location): Home    Distant Location (provider location):  Off-site  Platform used for Video Visit: [facility] Medical Weight Management Note     Holli Saxena  MRN:  [id number]  :  1975  ALIA:  2023    Dear Freya Scales MD,    I had the pleasure of seeing your patient Holli Saxena. She is a 48 year old female who I am continuing to see for treatment of obesity      Assessment & Plan   Problem List Items Addressed This Visit          Digestive    Class 2 severe obesity with serious comorbidity and body mass index (BMI) of 35.0 to 35.9 in adult (H) - Primary      Continue on Ozempic 2.0mg once weekly. No refills needed   Activity goal - walking 3xweek for 20min   Food goal - add fruit or vegetable 1xday   Follow up with Tsering Grijalva in 3 months     INTERVAL HISTORY:  First seen by Dr. Mahoney - 2010  Starting weight - 225lb  Started Metformin - stopped  Started Topiramate - discontinued due to side effect of fatigue   Started Phentermine - discontinued due to pregnancy   Weight gain due to pregnancy in 2018 - 264lbs  Started Contrave - stopped due to no longer helping  Restarted phentermine and topiramate  - discontinued during pandemic  Started Ozempic for type II diabetes   Last seen 23 - Mounjaro denied due to A1C <7%, increased Ozempic 2.0mg     Feeling very encouraged by results. Has lost 20lbs since her pregnancy in 2018.     Anti-obesity medications:     Current:   Ozempic 2.0mg - has been on this dose for 3 months. No side effects. Has been helpful with hunger.       Recent diet changes: Eating 2 meals a day. Continuing to intermittent fast - fasting for 16hours. Snacks on nuts during the day. Trying to increase fruits and vegetables. Has been eating out more due to being more stressed. Has been focusing on protein - increase in chicken.     Recent exercise/activity changes: Has  "been reaching activity goal - 2xweek for 20min. Has been walking.     Recent stressors: Increase stress with work, so feeling really good about losing weight during this season. Presenting next week, so hoping to be less stressed starting next week.     Recent sleep changes: Stable     Vitamins/Labs: To be collected at PCP visit in September     CURRENT WEIGHT:   244 lbs 0 oz    Initial Weight (lbs): 225 lbs  Last Visits Weight: 113 kg (249 lb 3.2 oz)  Cumulative weight loss (lbs): -19  Weight Loss Percentage: -8.44%    Wt Readings from Last 5 Encounters:   08/02/23 110.7 kg (244 lb)   04/26/23 113 kg (249 lb 3.2 oz)   11/30/22 112.2 kg (247 lb 4.8 oz)   06/17/22 113.2 kg (249 lb 9.6 oz)   06/08/22 113.6 kg (250 lb 6.4 oz)             7/26/2023     2:25 PM   Changes and Difficulties   I have made the following changes to my activity/exercise since my last visit: 20minutes activity twice a week         MEDICATIONS:   Current Outpatient Medications   Medication Sig Dispense Refill    lisinopril (ZESTRIL) 20 MG tablet TAKE 1 TABLET BY MOUTH EVERY DAY 90 tablet 1    LORazepam (ATIVAN) 0.5 MG tablet Take 1 tablet (0.5 mg) by mouth 2 times daily as needed for anxiety 20 tablet 0    NIFEdipine ER OSMOTIC (PROCARDIA XL) 30 MG 24 hr tablet Take 1 tablet (30 mg) by mouth daily 90 tablet 0    Semaglutide, 2 MG/DOSE, (OZEMPIC) 8 MG/3ML pen Inject 2 mg Subcutaneous every 7 days 9 mL 3    Flaxseed, Linseed, (FLAXSEED OIL PO) Take as directed      Multiple Vitamin (MULTIVITAMINS PO) Take 1 tablet by mouth daily.             7/26/2023     2:25 PM   Weight Loss Medication History Reviewed With Patient   Which weight loss medications are you currently taking on a regular basis? Ozempic   Are you having any side effects from the weight loss medication that we have prescribed you? No             Objective    Ht 1.778 m (5' 10\")   Wt 110.7 kg (244 lb)   BMI 35.01 kg/m      Vitals - Patient Reported  Pain Score: No Pain " (0)      Vitals:  No vitals were obtained today due to virtual visit.    PHYSICAL EXAM:  GENERAL: Healthy, alert and no distress  EYES: Eyes grossly normal to inspection.  No discharge or erythema, or obvious scleral/conjunctival abnormalities.  RESP: No audible wheeze, cough, or visible cyanosis.  No visible retractions or increased work of breathing.    SKIN: Visible skin clear. No significant rash, abnormal pigmentation or lesions.  NEURO: Cranial nerves grossly intact.  Mentation and speech appropriate for age.  PSYCH: Mentation appears normal, affect normal/bright, judgement and insight intact, normal speech and appearance well-groomed.        Sincerely,    TALYA JORDAN PA-C      24 minutes spent by me on the date of the encounter doing chart review, history and exam, documentation and further activities per the note

## 2023-08-11 ENCOUNTER — TELEPHONE (OUTPATIENT)
Dept: ENDOCRINOLOGY | Facility: CLINIC | Age: 48
End: 2023-08-11
Payer: COMMERCIAL

## 2023-08-11 NOTE — TELEPHONE ENCOUNTER
GEORGINA and sent myc for scheduling f/up in about 3 months (around oct '23) with Tsering Alfaro PA-C.

## 2023-08-21 DIAGNOSIS — E66.01 MORBID OBESITY (H): ICD-10-CM

## 2023-08-21 DIAGNOSIS — E28.2 PCOS (POLYCYSTIC OVARIAN SYNDROME): ICD-10-CM

## 2023-08-22 RX ORDER — LISINOPRIL 20 MG/1
20 TABLET ORAL DAILY
Qty: 90 TABLET | Refills: 0 | Status: SHIPPED | OUTPATIENT
Start: 2023-08-22 | End: 2023-09-26

## 2023-08-29 DIAGNOSIS — E28.2 PCOS (POLYCYSTIC OVARIAN SYNDROME): ICD-10-CM

## 2023-08-29 RX ORDER — NIFEDIPINE 30 MG/1
30 TABLET, EXTENDED RELEASE ORAL DAILY
Qty: 30 TABLET | Refills: 0 | Status: SHIPPED | OUTPATIENT
Start: 2023-08-29 | End: 2023-09-26

## 2023-08-30 ENCOUNTER — OFFICE VISIT (OUTPATIENT)
Dept: MIDWIFE SERVICES | Facility: CLINIC | Age: 48
End: 2023-08-30
Payer: COMMERCIAL

## 2023-08-30 VITALS
WEIGHT: 247 LBS | BODY MASS INDEX: 35.36 KG/M2 | DIASTOLIC BLOOD PRESSURE: 80 MMHG | HEIGHT: 70 IN | SYSTOLIC BLOOD PRESSURE: 118 MMHG

## 2023-08-30 DIAGNOSIS — Z01.419 WOMEN'S ANNUAL ROUTINE GYNECOLOGICAL EXAMINATION: Primary | ICD-10-CM

## 2023-08-30 DIAGNOSIS — Z12.4 PAP SMEAR FOR CERVICAL CANCER SCREENING: ICD-10-CM

## 2023-08-30 PROCEDURE — 87624 HPV HI-RISK TYP POOLED RSLT: CPT | Performed by: ADVANCED PRACTICE MIDWIFE

## 2023-08-30 PROCEDURE — 99386 PREV VISIT NEW AGE 40-64: CPT | Performed by: ADVANCED PRACTICE MIDWIFE

## 2023-08-30 PROCEDURE — G0145 SCR C/V CYTO,THINLAYER,RESCR: HCPCS | Performed by: ADVANCED PRACTICE MIDWIFE

## 2023-08-30 NOTE — NURSING NOTE
"Chief Complaint   Patient presents with    Physical     Pap         Initial /80   Ht 1.778 m (5' 10\")   Wt 112 kg (247 lb)   LMP 2023 (Exact Date)   BMI 35.44 kg/m   Estimated body mass index is 35.44 kg/m  as calculated from the following:    Height as of this encounter: 1.778 m (5' 10\").    Weight as of this encounter: 112 kg (247 lb).  BP completed using cuff size: regular    Questioned patient about current smoking habits.  Pt. has never smoked.          Annual visit    Mariah Hunter LPN on 2023 at 2:07 PM           "

## 2023-08-30 NOTE — PROGRESS NOTES
Holli is a 48 year old  female who presents for annual exam.     Besides routine health maintenance, she has no other health concerns today .  HPI:  Holli presents for her annual exam today. She has not been seen for a pap test for past 8 years.  Menses are regular q 28-30 days and crampy lasting 3 days.   Menses flow: normal.    Patient's last menstrual period was 2023 (exact date)..   Using none for contraception.    She is not currently considering pregnancy.    REPRODUCTIVE/GYNECOLOGIC HISTORY:  Holli is sexually active with male partner(s) and is currently in monogamous relationship x 25 years.   Using condoms for contraception.  STI testing offered?  Declined  History of abnormal Pap smear:  No  SOCIAL HISTORY  Abuse: does not report having previously been physical or sexually abused.    Do you feel safe in your environment? YES       She  reports that she has never smoked. She has never used smokeless tobacco.      Last PHQ-9 score on record =       2018     9:25 AM   PHQ-9 SCORE   PHQ-9 Total Score 0     Last GAD7 score on record =       2018     9:25 AM   MOOSE-7 SCORE   Total Score 1     Alcohol Score = 0    HEALTH MAINTENANCE:  Cholesterol: (  Cholesterol   Date Value Ref Range Status   2022 196 <200 mg/dL Final   2022 191 <200 mg/dL Final   10/11/2017 218 (H) <200 mg/dL Final     Comment:     Desirable:       <200 mg/dl   2016 198 <200 mg/dL Final    History of abnormal lipids: No  Mammo:  . History of abnormal Mammo: No.  Regular Self Breast Exams: Yes  TSH: (  TSH   Date Value Ref Range Status   2022 1.89 0.40 - 4.00 mU/L Final   10/11/2017 2.72 0.40 - 4.00 mU/L Final    )  Pap; (  Lab Results   Component Value Date    PAP NIL 10/28/2013    PAP NIL 2010    )  Immunizations up to date: yes  (Gardasil, Tdap, Flu)  Health maintenance updated:  yes    HEALTHY HABITS  Eating habits: follows a balanced nutrition diet  Calcium/Vitamin D intake: source:   dairy Adequate?   Exercise: How often do you exercise? 1-3 times/week;Walking  Have you had an eye exam in the last two years? YES     Do you routinely see the dentist once or twice yearly? YES         HISTORY:  OB History    Para Term  AB Living   1 1 1 0 0 1   SAB IAB Ectopic Multiple Live Births   0 0 0 0 1      # Outcome Date GA Lbr Nadir/2nd Weight Sex Delivery Anes PTL Lv   1 Term 01/30/15 37w3d  3.204 kg (7 lb 1 oz) M CS-LTranv   SHERRIE      Apgar1: 5  Apgar5: 9     Past Medical History:   Diagnosis Date    Hx of previous reproductive problem     Hyperlipidemia     Obesity, unspecified     PCOS (polycystic ovarian syndrome)     Type 2 diabetes mellitus (H) 2022    Unspecified essential hypertension      Past Surgical History:   Procedure Laterality Date     SECTION N/A 2015    Procedure:  SECTION;  Surgeon: Meena Arnold MD;  Location: [facility] L[facility]    NO HISTORY OF SURGERY       Family History   Problem Relation Age of Onset    Diabetes Mother         type 2    Cardiovascular Mother     Obesity Mother     Diabetes Father         type 2    Obesity Father     Hypertension Father     Gynecology Sister         PCOS    Obesity Sister     Alzheimer Disease Paternal Grandmother     Cerebrovascular Disease Paternal Grandfather     Cardiovascular Paternal Grandfather     Hypertension Brother     C.A.D. Brother         mild MI    Obesity Brother     Breast Cancer No family hx of     Ovarian Cancer No family hx of      Social History     Socioeconomic History    Marital status:      Spouse name: None    Number of children: None    Years of education: None    Highest education level: None   Occupational History    Occupation:      Employer: [employer]   Tobacco Use    Smoking status: Never    Smokeless tobacco: Never   Vaping Use    Vaping Use: Never used   Substance and Sexual Activity    Alcohol use: No    Drug use: No    Sexual activity: Yes     Partners:  Male     Birth control/protection: Pill   Social History Narrative    How much exercise per week? walking    How much calcium per day? One glass milk per day, vitamin       How much caffeine per day? 3 cups per week    How much vitamin D per day? Multi-vitamin    Do you/your family wear seatbelts?  Yes    Do you/your family use safety helmets? No activities requiring use    Do you/your family use sunscreen? Yes    Do you/your family keep firearms in the home? Yes    Do you/your family have a smoke detector(s)? Yes        Do you feel safe in your home? Yes    Has anyone ever touched you in an unwanted manner? No     Explain     MEHNAZ Gleason June 24, 2014 11:44 AM                       Current Outpatient Medications:     Flaxseed, Linseed, (FLAXSEED OIL PO), Take as directed, Disp: , Rfl:     lisinopril (ZESTRIL) 20 MG tablet, Take 1 tablet (20 mg) by mouth daily, Disp: 90 tablet, Rfl: 0    Multiple Vitamin (MULTIVITAMINS PO), Take 1 tablet by mouth daily., Disp: , Rfl:     NIFEdipine ER OSMOTIC (PROCARDIA XL) 30 MG 24 hr tablet, Take 1 tablet (30 mg) by mouth daily, Disp: 30 tablet, Rfl: 0    Semaglutide, 2 MG/DOSE, (OZEMPIC) 8 MG/3ML pen, Inject 2 mg Subcutaneous every 7 days, Disp: 9 mL, Rfl: 3    LORazepam (ATIVAN) 0.5 MG tablet, Take 1 tablet (0.5 mg) by mouth 2 times daily as needed for anxiety (Patient not taking: Reported on 8/30/2023), Disp: 20 tablet, Rfl: 0   No Known Allergies    Past medical, surgical, social and family history were reviewed and updated in [facility].    ROS:   CONSTITUTIONAL: NEGATIVE for fever, chills, change in weight  INTEGUMENTARU/SKIN: NEGATIVE for worrisome rashes, moles or lesions  EYES: NEGATIVE for vision changes or irritation  ENT: NEGATIVE for ear, mouth and throat problems  RESP: NEGATIVE for significant cough or SOB  BREAST: NEGATIVE for masses, tenderness or discharge  CV: NEGATIVE for chest pain, palpitations or peripheral edema  GI: NEGATIVE for nausea, abdominal pain, heartburn,  "or change in bowel habits  : NEGATIVE for unusual urinary or vaginal symptoms. Periods are regular.  MUSCULOSKELETAL: NEGATIVE for significant arthralgias or myalgia  NEURO: NEGATIVE for weakness, dizziness or paresthesias  ENDOCRINE: NEGATIVE for temperature intolerance, skin/hair changes  HEME/ALLERGY/IMMUNE: NEGATIVE for bleeding problems  PSYCHIATRIC: NEGATIVE for changes in mood or affect    PHYSICAL EXAM:  /80   Ht 1.778 m (5' 10\")   Wt 112 kg (247 lb)   LMP 08/04/2023 (Exact Date)   BMI 35.44 kg/m     BMI: Body mass index is 35.44 kg/m .  Constitutional: healthy, alert and no distress  Neck: symmetrical, thyroid normal size, no masses present, no lymphadenopathy present.   Cardiovascular: RRR, no murmurs present  Respiratory: breathing unlabored, lungs CTA bilaterally  Breast:normal without masses, tenderness or nipple discharge and no palpable axillary masses or adenopathy  Gastrointestinal: abdomen soft, non-tender, bowel sounds present  PELVIC EXAM:  Vulva: No lesions, no adenopathy, BUS WNL  Vagina: Moist, pink, discharge normal  well rugated, no lesions  Cervix:smooth, pink, no visible lesions, Pap obtained  Remainder of pelvic deferred as patient is not having any concerns.  Rectal exam: deferred    ASSESSMENT/PLAN:    ICD-10-CM    1. Women's annual routine gynecological examination  Z01.419       2. Pap smear for cervical cancer screening  Z12.4 Pap screen with HPV - recommended age 30 - 65 years        No results found for any visits on 08/30/23.      COUNSELING:   Reviewed preventive health counseling, as reflected in patient instructions   reports that she has never smoked. She has never used smokeless tobacco.      ELIA Mosley, LUCIA        "

## 2023-09-05 LAB
BKR LAB AP GYN ADEQUACY: NORMAL
BKR LAB AP GYN INTERPRETATION: NORMAL
BKR LAB AP HPV REFLEX: NORMAL
BKR LAB AP LMP: NORMAL
BKR LAB AP PREVIOUS ABNORMAL: NORMAL
PATH REPORT.COMMENTS IMP SPEC: NORMAL
PATH REPORT.COMMENTS IMP SPEC: NORMAL
PATH REPORT.RELEVANT HX SPEC: NORMAL

## 2023-09-07 LAB
HUMAN PAPILLOMA VIRUS 16 DNA: NEGATIVE
HUMAN PAPILLOMA VIRUS 18 DNA: NEGATIVE
HUMAN PAPILLOMA VIRUS FINAL DIAGNOSIS: NORMAL
HUMAN PAPILLOMA VIRUS OTHER HR: NEGATIVE

## 2023-09-19 ENCOUNTER — HOSPITAL ENCOUNTER (OUTPATIENT)
Dept: MAMMOGRAPHY | Facility: CLINIC | Age: 48
Discharge: HOME | End: 2023-09-19
Attending: INTERNAL MEDICINE | Admitting: INTERNAL MEDICINE
Payer: COMMERCIAL

## 2023-09-19 DIAGNOSIS — Z12.31 VISIT FOR SCREENING MAMMOGRAM: ICD-10-CM

## 2023-09-19 PROCEDURE — 77067 SCR MAMMO BI INCL CAD: CPT

## 2023-09-24 ENCOUNTER — HEALTH MAINTENANCE LETTER (OUTPATIENT)
Age: 48
End: 2023-09-24

## 2023-09-26 ENCOUNTER — VIRTUAL VISIT (OUTPATIENT)
Dept: INTERNAL MEDICINE | Facility: CLINIC | Age: 48
End: 2023-09-26
Payer: COMMERCIAL

## 2023-09-26 DIAGNOSIS — E78.5 HYPERLIPIDEMIA LDL GOAL <100: ICD-10-CM

## 2023-09-26 DIAGNOSIS — E66.01 MORBID OBESITY (H): ICD-10-CM

## 2023-09-26 DIAGNOSIS — E11.9 TYPE 2 DIABETES MELLITUS WITHOUT COMPLICATION, WITHOUT LONG-TERM CURRENT USE OF INSULIN (H): Primary | ICD-10-CM

## 2023-09-26 DIAGNOSIS — I10 HTN, GOAL BELOW 140/90: ICD-10-CM

## 2023-09-26 PROCEDURE — 99213 OFFICE O/P EST LOW 20 MIN: CPT | Mod: VID | Performed by: INTERNAL MEDICINE

## 2023-09-26 RX ORDER — LISINOPRIL 20 MG/1
20 TABLET ORAL DAILY
Qty: 90 TABLET | Refills: 1 | Status: SHIPPED | OUTPATIENT
Start: 2023-09-26

## 2023-09-26 RX ORDER — NIFEDIPINE 30 MG/1
30 TABLET, EXTENDED RELEASE ORAL DAILY
Qty: 90 TABLET | Refills: 1 | Status: SHIPPED | OUTPATIENT
Start: 2023-09-26 | End: 2024-03-29

## 2023-09-26 NOTE — PROGRESS NOTES
Holli is a 48 year old who is being evaluated via a billable video visit.      How would you like to obtain your AVS? MyChart  If the video visit is dropped, the invitation should be resent by: Text to cell phone: [phone]  Will anyone else be joining your video visit? No        This is a VIDEO ( using Doxim[employer])  encounter with the patient.       Location of the provider : office   Location of the patient : home      17:06 --- 17:19            Dr Whatley's note      Patient's instructions / PLAN:                                                        Plan:  Please make a lab appointment for fasting labs    2. Continue same meds, same doses for now   3. Follow up appointment depending on the lab results       ASSESSMENT & PLAN:                                                      (E11.9) Type 2 diabetes mellitus without complication, without long-term current use of insulin (H)  (primary encounter diagnosis)  Comment: not sure if controlled or not because no recent labs  Plan: CBC with platelets, Lipid panel reflex to         direct LDL Fasting, Comprehensive metabolic         panel, TSH with free T4 reflex, Albumin Random         Urine Quantitative with Creat Ratio, Hemoglobin        A1c            (I10) HTN, goal below 140/90  Comment: Controlled    Plan: CBC with platelets, Lipid panel reflex to         direct LDL Fasting, Comprehensive metabolic         panel, TSH with free T4 reflex, Albumin Random         Urine Quantitative with Creat Ratio, Hemoglobin        A1c            (E78.5) Hyperlipidemia LDL goal <100  Comment: borderline  Plan: CBC with platelets, Lipid panel reflex to         direct LDL Fasting, Comprehensive metabolic         panel, TSH with free T4 reflex, Albumin Random         Urine Quantitative with Creat Ratio, Hemoglobin        A1c              (E66.01) Morbid obesity (H)  Comment:   Plan: lisinopril (ZESTRIL) 20 MG tablet        F/unit(s) w endo         Chief complaint:                                                       Follow up chronic medical problems       SUBJECTIVE:                                                    History of present illness:    LOV w dr Whatley -- 2022    She had annual exam w midwife      She sees Tsering angulo for obesity . No A1c done    HTN  -- Controlled          Subjective   Holli is a 48 year old, presenting for the following health issues:  Follow Up      2023     4:09 PM   Additional Questions   Roomed by Kaylynn HU CMA       History of Present Illness       Reason for visit:  Annual check in    She eats 2-3 servings of fruits and vegetables daily.She consumes 0 sweetened beverage(s) daily.She exercises with enough effort to increase her heart rate 20 to 29 minutes per day.  She exercises with enough effort to increase her heart rate 3 or less days per week.   She is taking medications regularly.     Type of service:  Video Visit         Review of Systems:                                                      ROS: negative for fever, chills, cough, wheezes, chest pain, shortness of breath, vomiting, abdominal pain, leg swelling       OBJECTIVE:           An actual physical exam can't be done during phone visit   A limited exam can sometimes be performed by video visit   NAD       PMHx: reviewed  Past Medical History:   Diagnosis Date    Hx of previous reproductive problem     Hyperlipidemia     Obesity, unspecified     PCOS (polycystic ovarian syndrome)     Type 2 diabetes mellitus (H) 2022    Unspecified essential hypertension       PSHx: reviewed  Past Surgical History:   Procedure Laterality Date     SECTION N/A 2015    Procedure:  SECTION;  Surgeon: Meena Arnold MD;  Location:  L+D    NO HISTORY OF SURGERY          Meds: reviewed  Current Outpatient Medications   Medication Sig Dispense Refill    Flaxseed, Linseed, (FLAXSEED OIL PO) Take as directed      lisinopril (ZESTRIL) 20 MG tablet Take 1 tablet (20 mg) by mouth  daily 90 tablet 0    LORazepam (ATIVAN) 0.5 MG tablet Take 1 tablet (0.5 mg) by mouth 2 times daily as needed for anxiety 20 tablet 0    Multiple Vitamin (MULTIVITAMINS PO) Take 1 tablet by mouth daily.      NIFEdipine ER OSMOTIC (PROCARDIA XL) 30 MG 24 hr tablet Take 1 tablet (30 mg) by mouth daily 30 tablet 0    Semaglutide, 2 MG/DOSE, (OZEMPIC) 8 MG/3ML pen Inject 2 mg Subcutaneous every 7 days 9 mL 3       Soc Hx: reviewed  Fam Hx: reviewed        Chart documentation was completed, in part, with [employer] voice-recognition software. Even though reviewed, some grammatical, spelling, and word errors may remain.    Freya Whatley MD  Internal Medicine

## 2023-11-14 ENCOUNTER — MYC REFILL (OUTPATIENT)
Dept: ENDOCRINOLOGY | Facility: CLINIC | Age: 48
End: 2023-11-14
Payer: COMMERCIAL

## 2023-11-14 DIAGNOSIS — E66.811 CLASS 1 OBESITY WITH SERIOUS COMORBIDITY AND BODY MASS INDEX (BMI) OF 34.0 TO 34.9 IN ADULT, UNSPECIFIED OBESITY TYPE: ICD-10-CM

## 2023-11-14 DIAGNOSIS — E66.812 CLASS 2 SEVERE OBESITY WITH SERIOUS COMORBIDITY AND BODY MASS INDEX (BMI) OF 35.0 TO 35.9 IN ADULT, UNSPECIFIED OBESITY TYPE (H): ICD-10-CM

## 2023-11-14 DIAGNOSIS — E66.01 CLASS 2 SEVERE OBESITY WITH SERIOUS COMORBIDITY AND BODY MASS INDEX (BMI) OF 35.0 TO 35.9 IN ADULT, UNSPECIFIED OBESITY TYPE (H): ICD-10-CM

## 2023-11-19 ENCOUNTER — LAB (OUTPATIENT)
Dept: LAB | Facility: CLINIC | Age: 48
End: 2023-11-19
Payer: COMMERCIAL

## 2023-11-19 DIAGNOSIS — I10 HTN, GOAL BELOW 140/90: ICD-10-CM

## 2023-11-19 DIAGNOSIS — E11.9 TYPE 2 DIABETES MELLITUS WITHOUT COMPLICATION, WITHOUT LONG-TERM CURRENT USE OF INSULIN (H): ICD-10-CM

## 2023-11-19 DIAGNOSIS — E78.5 HYPERLIPIDEMIA LDL GOAL <100: ICD-10-CM

## 2023-11-19 LAB
ALBUMIN SERPL BCG-MCNC: 4.4 G/DL (ref 3.5–5.2)
ALP SERPL-CCNC: 85 U/L (ref 40–150)
ALT SERPL W P-5'-P-CCNC: 24 U/L (ref 0–50)
ANION GAP SERPL CALCULATED.3IONS-SCNC: 10 MMOL/L (ref 7–15)
AST SERPL W P-5'-P-CCNC: 26 U/L (ref 0–45)
BILIRUB SERPL-MCNC: 0.4 MG/DL
BUN SERPL-MCNC: 7.1 MG/DL (ref 6–20)
CALCIUM SERPL-MCNC: 9.3 MG/DL (ref 8.6–10)
CHLORIDE SERPL-SCNC: 106 MMOL/L (ref 98–107)
CHOLEST SERPL-MCNC: 179 MG/DL
CREAT SERPL-MCNC: 0.67 MG/DL (ref 0.51–0.95)
CREAT UR-MCNC: 83.4 MG/DL
DEPRECATED HCO3 PLAS-SCNC: 23 MMOL/L (ref 22–29)
EGFRCR SERPLBLD CKD-EPI 2021: >90 ML/MIN/1.73M2
ERYTHROCYTE [DISTWIDTH] IN BLOOD BY AUTOMATED COUNT: 11.8 % (ref 10–15)
GLUCOSE SERPL-MCNC: 112 MG/DL (ref 70–99)
HBA1C MFR BLD: 6 % (ref 0–5.6)
HCT VFR BLD AUTO: 45.5 % (ref 35–47)
HDLC SERPL-MCNC: 43 MG/DL
HGB BLD-MCNC: 15.4 G/DL (ref 11.7–15.7)
LDLC SERPL CALC-MCNC: 107 MG/DL
MCH RBC QN AUTO: 31.2 PG (ref 26.5–33)
MCHC RBC AUTO-ENTMCNC: 33.8 G/DL (ref 31.5–36.5)
MCV RBC AUTO: 92 FL (ref 78–100)
MICROALBUMIN UR-MCNC: <12 MG/L
MICROALBUMIN/CREAT UR: NORMAL MG/G{CREAT}
NONHDLC SERPL-MCNC: 136 MG/DL
PLATELET # BLD AUTO: 348 10E3/UL (ref 150–450)
POTASSIUM SERPL-SCNC: 4.1 MMOL/L (ref 3.4–5.3)
PROT SERPL-MCNC: 7.3 G/DL (ref 6.4–8.3)
RBC # BLD AUTO: 4.94 10E6/UL (ref 3.8–5.2)
SODIUM SERPL-SCNC: 139 MMOL/L (ref 135–145)
TRIGL SERPL-MCNC: 145 MG/DL
TSH SERPL DL<=0.005 MIU/L-ACNC: 1.54 UIU/ML (ref 0.3–4.2)
WBC # BLD AUTO: 6 10E3/UL (ref 4–11)

## 2023-11-19 PROCEDURE — 84443 ASSAY THYROID STIM HORMONE: CPT

## 2023-11-19 PROCEDURE — 82043 UR ALBUMIN QUANTITATIVE: CPT

## 2023-11-19 PROCEDURE — 82570 ASSAY OF URINE CREATININE: CPT

## 2023-11-19 PROCEDURE — 36415 COLL VENOUS BLD VENIPUNCTURE: CPT

## 2023-11-19 PROCEDURE — 80053 COMPREHEN METABOLIC PANEL: CPT

## 2023-11-19 PROCEDURE — 80061 LIPID PANEL: CPT

## 2023-11-19 PROCEDURE — 83036 HEMOGLOBIN GLYCOSYLATED A1C: CPT

## 2023-11-19 PROCEDURE — 85027 COMPLETE CBC AUTOMATED: CPT

## 2023-11-20 DIAGNOSIS — E11.9 TYPE 2 DIABETES MELLITUS WITHOUT COMPLICATION, WITHOUT LONG-TERM CURRENT USE OF INSULIN (H): Primary | ICD-10-CM

## 2023-11-21 ENCOUNTER — TELEPHONE (OUTPATIENT)
Dept: ENDOCRINOLOGY | Facility: CLINIC | Age: 48
End: 2023-11-21
Payer: COMMERCIAL

## 2023-11-21 DIAGNOSIS — E66.811 CLASS 1 OBESITY WITH SERIOUS COMORBIDITY AND BODY MASS INDEX (BMI) OF 34.0 TO 34.9 IN ADULT, UNSPECIFIED OBESITY TYPE: ICD-10-CM

## 2023-11-21 DIAGNOSIS — E66.812 CLASS 2 SEVERE OBESITY WITH SERIOUS COMORBIDITY AND BODY MASS INDEX (BMI) OF 35.0 TO 35.9 IN ADULT, UNSPECIFIED OBESITY TYPE (H): ICD-10-CM

## 2023-11-21 DIAGNOSIS — E66.01 CLASS 2 SEVERE OBESITY WITH SERIOUS COMORBIDITY AND BODY MASS INDEX (BMI) OF 35.0 TO 35.9 IN ADULT, UNSPECIFIED OBESITY TYPE (H): ICD-10-CM

## 2023-11-21 NOTE — TELEPHONE ENCOUNTER
General Call    Contacts         Type Contact Phone/Fax    11/21/2023 10:51 AM CST Phone (Incoming) Hemanth Holli M (Self) [phone] (H)          Reason for Call:  Ozempic    What are your questions or concerns: pt located Ozempic at the [facility] in [place] and would like her prescription sent there as soon as possible  Could we send this information to you in [facility] or would you prefer to receive a phone call?:   Patient would prefer a phone call   Okay to leave a detailed message?: Yes at Cell number on file:    Telephone Information:   Mobile [phone]

## 2024-01-10 ENCOUNTER — TRANSFERRED RECORDS (OUTPATIENT)
Dept: HEALTH INFORMATION MANAGEMENT | Facility: CLINIC | Age: 49
End: 2024-01-10
Payer: COMMERCIAL

## 2024-01-19 ENCOUNTER — VIRTUAL VISIT (OUTPATIENT)
Dept: ENDOCRINOLOGY | Facility: CLINIC | Age: 49
End: 2024-01-19
Payer: COMMERCIAL

## 2024-01-19 ENCOUNTER — TELEPHONE (OUTPATIENT)
Dept: ENDOCRINOLOGY | Facility: CLINIC | Age: 49
End: 2024-01-19

## 2024-01-19 ENCOUNTER — TRANSCRIBE ORDERS (OUTPATIENT)
Dept: OTHER | Age: 49
End: 2024-01-19

## 2024-01-19 VITALS — WEIGHT: 244.6 LBS | BODY MASS INDEX: 35.02 KG/M2 | HEIGHT: 70 IN

## 2024-01-19 DIAGNOSIS — M43.10 SPONDYLOLISTHESIS, GRADE 2: ICD-10-CM

## 2024-01-19 DIAGNOSIS — E11.9 TYPE 2 DIABETES MELLITUS WITHOUT COMPLICATION, WITHOUT LONG-TERM CURRENT USE OF INSULIN (H): ICD-10-CM

## 2024-01-19 DIAGNOSIS — E66.812 CLASS 2 SEVERE OBESITY WITH SERIOUS COMORBIDITY AND BODY MASS INDEX (BMI) OF 35.0 TO 35.9 IN ADULT, UNSPECIFIED OBESITY TYPE (H): ICD-10-CM

## 2024-01-19 DIAGNOSIS — M25.559 HIP PAIN: ICD-10-CM

## 2024-01-19 DIAGNOSIS — E66.01 CLASS 2 SEVERE OBESITY WITH SERIOUS COMORBIDITY AND BODY MASS INDEX (BMI) OF 35.0 TO 35.9 IN ADULT, UNSPECIFIED OBESITY TYPE (H): ICD-10-CM

## 2024-01-19 DIAGNOSIS — M54.50 LOWER BACK PAIN: Primary | ICD-10-CM

## 2024-01-19 PROCEDURE — 99213 OFFICE O/P EST LOW 20 MIN: CPT | Mod: 95

## 2024-01-19 RX ORDER — TIRZEPATIDE 7.5 MG/.5ML
7.5 INJECTION, SOLUTION SUBCUTANEOUS
Qty: 6 ML | Refills: 1 | Status: SHIPPED | OUTPATIENT
Start: 2024-01-19 | End: 2024-09-05 | Stop reason: DRUGHIGH

## 2024-01-19 ASSESSMENT — PAIN SCALES - GENERAL: PAINLEVEL: SEVERE PAIN (6)

## 2024-01-19 NOTE — NURSING NOTE
Is the patient currently in the state of [place]? YES    Visit mode:VIDEO    If the visit is dropped, the patient can be reconnected by: VIDEO VISIT: Text to cell phone:   Telephone Information:   Mobile [phone]       Will anyone else be joining the visit? NO  (If patient encounters technical issues they should call [phone])    How would you like to obtain your AVS? MyChart    Are changes needed to the allergy or medication list? Pt stated no changes to allergies and Pt stated no med changes    Reason for visit: Follow Up    Josephine LIVE

## 2024-01-19 NOTE — PROGRESS NOTES
Virtual Visit Details    Type of service:  Video Visit     Originating Location (pt. Location): Home    Distant Location (provider location):  Off-site  Platform used for Video Visit: [facility] Medical Weight Management Note     Holli Saxena  MRN:  [id number]  :  1975  ALIA:  2024    Dear Freya Scales MD,    I had the pleasure of seeing your patient Holli Saxena. She is a 48 year old female who I am continuing to see for treatment of obesity related to:         No data to display                Assessment & Plan   Problem List Items Addressed This Visit       Class 2 severe obesity with serious comorbidity and body mass index (BMI) of 35.0 to 35.9 in adult (H)    Relevant Medications    tirzepatide (MOUNJARO) 7.5 MG/0.5ML pen    Type 2 diabetes mellitus (H)    Relevant Medications    tirzepatide (MOUNJARO) 7.5 MG/0.5ML pen      Transition to Mounjaro 7.5mg once weekly.   If not covered, will continue Ozempic 2.0mg once weekly. No refills needed   Can consider Zepbound in the future once on formulary   Follow up with Tsering Grijalva in 3 months     INTERVAL HISTORY:  First seen by Dr. Mahoney - 2010  Starting weight - 225lb  Started Metformin - stopped  Started Topiramate - discontinued due to side effect of fatigue   Started Phentermine - discontinued due to pregnancy   Weight gain due to pregnancy in 2018 - 264lbs  Started Contrave - stopped due to no longer helping  Restarted phentermine and topiramate  - discontinued during pandemic  Started Ozempic for type II diabetes   Mounjaro denied due to A1C <7%, increased Ozempic 2.0mg       Regained 4lb during the holidays, has lost 2lbs in the past week. Feels like has been weight stable around 240lbs prior to the holidays    Anti-obesity medication history    Current:   Ozempic 2.0mg - overall has continued to be helpful. No side effects.     Past/Failed/contraindicated:   Metformin - not helpeful with weightloss. Denies side  effects.   Topiramate - side effects of fatigure   Phentermine - Stopped helping with hunger suppression. Some constipation. Denies side effects.   Contrave - not helpful with weightloss. Denies side effects.     Recent diet changes: Harder though the holidays. Decrease in hunger and portion sizes. Focusing back on increase protein. Decrease in sugar. Restarted intermittent fasting - 17hours. Continuing to focus on vegetables. Having increase stress eating at times.     Recent exercise/activity changes: Very limited due to back pain, leg will go numb with prolonged walking. Will be seeing a neurosurgeon.     Recent stressors: Stable. Good support system.     Recent sleep changes: No concerns. Harder at time due to leg pain     Vitamins/Labs: A1C 6.0 on 11/19/2023     CURRENT WEIGHT:   244 lbs 9.6 oz    Initial Weight (lbs): 225 lbs  Last Visits Weight: 110.7 kg (244 lb)  Cumulative weight loss (lbs): -19.6  Weight Loss Percentage: -8.71%    Wt Readings from Last 5 Encounters:   01/19/24 110.9 kg (244 lb 9.6 oz)   08/30/23 112 kg (247 lb)   08/02/23 110.7 kg (244 lb)   04/26/23 113 kg (249 lb 3.2 oz)   11/30/22 112.2 kg (247 lb 4.8 oz)             1/16/2024    10:11 AM   Changes and Difficulties   With regards to my diet, I am still struggling with: Eating too many simple carbs   With regards to my activity/exercise, I am still struggling with: Sciatica.         MEDICATIONS:   Current Outpatient Medications   Medication Sig Dispense Refill    tirzepatide (MOUNJARO) 7.5 MG/0.5ML pen Inject 7.5 mg Subcutaneous every 7 days 6 mL 1    Flaxseed, Linseed, (FLAXSEED OIL PO) Take as directed      lisinopril (ZESTRIL) 20 MG tablet Take 1 tablet (20 mg) by mouth daily 90 tablet 1    LORazepam (ATIVAN) 0.5 MG tablet Take 1 tablet (0.5 mg) by mouth 2 times daily as needed for anxiety 20 tablet 0    Multiple Vitamin (MULTIVITAMINS PO) Take 1 tablet by mouth daily.      NIFEdipine ER OSMOTIC (PROCARDIA XL) 30 MG 24 hr tablet Take  "1 tablet (30 mg) by mouth daily 90 tablet 1    Semaglutide, 2 MG/DOSE, (OZEMPIC) 8 MG/3ML pen Inject 2 mg Subcutaneous every 7 days 9 mL 3           1/16/2024    10:11 AM   Weight Loss Medication History Reviewed With Patient   Which weight loss medications are you currently taking on a regular basis? Ozempic   Are you having any side effects from the weight loss medication that we have prescribed you? No         Objective    Ht 1.778 m (5' 10\")   Wt 110.9 kg (244 lb 9.6 oz)   BMI 35.10 kg/m      Vitals - Patient Reported  Pain Score: Severe Pain (6)  Pain Loc: Low Back      PHYSICAL EXAM:    GENERAL: alert and no distress  EYES: Eyes grossly normal to inspection.  No discharge or erythema, or obvious scleral/conjunctival abnormalities.  RESP: No audible wheeze, cough, or visible cyanosis.    SKIN: Visible skin clear. No significant rash, abnormal pigmentation or lesions.  NEURO: Cranial nerves grossly intact.  Mentation and speech appropriate for age.  PSYCH: Appropriate affect, tone, and pace of words        Sincerely,    Tsering Alfaro PA-C      25 minutes spent by me on the date of the encounter doing chart review, history and exam, documentation and further activities per the note  "

## 2024-01-19 NOTE — TELEPHONE ENCOUNTER
Prior Authorization Approval    Medication: MOUNJARO 7.5 MG/0.5ML SC SOPN  Authorization Effective Date: 12/20/2023  Authorization Expiration Date: 1/18/2025  Approved Dose/Quantity: 2ml/28 days   Reference #: [id number]   Insurance Company: [employer] FEDERAL - Phone [phone] Fax [phone]  Expected CoPay: $ 35  CoPay Card Available:      Financial Assistance Needed: no  Which Pharmacy is filling the prescription:    Pharmacy Notified: no  Patient Notified: pharmacy will notify patient

## 2024-01-19 NOTE — PROGRESS NOTES
"Virtual Visit Details    Type of service:  Video Visit     Originating Location (pt. Location): {video visit patient location:698727::\"Home\"}  {PROVIDER LOCATION On-site should be selected for visits conducted from your clinic location or adjoining [facility] hospital, academic office, or other nearby [facility] building. Off-site should be selected for all other provider locations, including home:779685}  Distant Location (provider location):  {virtual location provider:784832}  Platform used for Video Visit: {Virtual Visit Platforms:320876::\"Kite\"}    "

## 2024-01-19 NOTE — TELEPHONE ENCOUNTER
PA Initiation    Medication: MOUNJARO 7.5 MG/0.5ML SC SOPN  Insurance Company: [employer] [facility] FEDERAL - Phone [phone] Fax [phone]  Pharmacy Filling the Rx:    Filling Pharmacy Phone:    Filling Pharmacy Fax:    Start Date: 1/19/2024    Key: [id number]

## 2024-01-19 NOTE — PATIENT INSTRUCTIONS
"Mac De La Garza,  Thank you for allowing us the privilege of caring for you. We hope we provided you with the excellent service you deserve.   Please let us know if there is anything else we can do for you so that we can be sure you are completely satisfied with your care experience.    To ensure the quality of our services you may be receiving a patient satisfaction survey from an independent patient satisfaction monitoring company.    The greatest compliment you can give is a \"Likely to Recommend\"    Your visit was with Tsering Alfaro PA-C today.    Instructions per today's visit:     Transition to Mounjaro 7.5mg once weekly.   If not covered, will continue Ozempic 2.0mg once weekly. No refills needed   Can consider Zepbound in the future once on formulary   Follow up with Tsering Grijalva in 3 months     ___________________________________________________________________________  Important contact and scheduling information:  Please call our contact center at [phone] to schedule your next appointments.  For any nursing questions or concerns call Zaynab Mora LPN at [phone] or Lissy Davis RN at [phone]  Please call during clinic hours Monday through Friday 8:00a - 4:00p if you have questions or you can contact us via [employer] at anytime and we will reply during clinic hours.    Lab results will be communicated through My Chart or letter (if My Chart not used). Please call the clinic if you have not received communication after 1 week or if you have any questions.?  Clinic Fax: [phone]  __________________________________________________________________________    If labs were ordered today:    Please make an appointment to have them drawn at your convenience.     To schedule the Lab Appointment using [employer]:  Select \"Schedule an Appointment\"  Select \"Lab Only\"  For \"A couple of questions\", select \"Other\"  For \"Which locations work for you?, select the location and set up the appointment    To schedule by " phone call [phone] to schedule a lab only appointment at any [facility] lab.  ___________________________________________________________________________  Work with A Health !  Virtual Sessions are Available through [facility] Weight Management Clinics    To learn more, call to schedule a free, Health  Q&A appointment: [phone]     What is Health Coaching?  Do you know what you are supposed to do, but you just aren't doing it?  Then, HEALTH COACHING may help you!   Get unstuck and move forward with the support of a professionally trained NBC-HWC (National Board-Certified Health and ) who uses evidence-based approaches to help you move forward with healthy lifestyle changes in the areas of weight loss, stress management and overall well-being.    Health Coaches help you identify goals that will work best for you. Health Coaches provide support and encouragement with overcoming barriers and help you to find inspiration and motivation to lead a healthy lifestyle.    Option one:  Health Coaching 3-Pack; Three, 30-minute Health Coaching Visits, for $99  Visits are done virtually (phone or video)  This is a self pay service; we do not accept insurance for dwayne coaching.    Option two:   The 24 week Plan; 11 Health Coaching Visits, and a 7 months subscription to [employer]-- on-demand fitness, nutrition and mindfulness classes, for $499 (employee discounts may be available). Participants will also meet regularly with a weight management Medical Provider and a Registered/Licensed Dietician.  This is a self-pay service; we do not accept insurance for health coaching.    To Schedule a free Health  Q&A appointment to learn more,  call [phone].  ____________________________________________________________________  [facility]  Healthy Lifestyle Group    Healthy Lifestyle Group  This is a 60 minute virtual coaching group for  "those who want to lead a healthier lifestyle. Come together to set goals and overcome barriers in a supportive group environment. We will address the four pillars of health--nutrition, exercise, sleep and emotional well-being.  This group is highly recommended for those who are participating in the 24 week Healthy Lifestyle Plan and our Health Coaching sessions.    WHEN: This group meets the first Friday of the month, 12:30 PM - 1:30 PM online, via a zoom meeting.      FACILITATOR: Led by National Board Certified Health and , Citlali Armenta [facility].    TO REGISTER: Please call the Call Center at [phone] to register. You will get an appointment to attend in [employer] [place]. Fifteen minutes prior to the meeting, complete the e-check in and you will get the link to join the meeting.  There is no charge to attend this group and space is limited.      2023 and 2024 Meeting Topics and Dates:    November 3: Introduction to Mindfulness (Learn simple and effective mindfulness practices and how it can benefit you)    December 8: Let's Talk (guided discussion on our wins and challenges)    January 5: New Years Vision: Manifest your Best 2024! (Guided imagery,  journaling and discussion)    February 2: Let's Talk    March 1: 10 Percent Happier by Quinn Amezquita (Book Bites; a guided discussion on the nuggets of wisdom from favorite wellness books; no need to read the book but highly encouraged)    April 5: Let's Talk    May 3: \"Essentialism; The Disciplined Pursuit of Less by Tej Bowers (book bites discussion)    June 7: Let's Talk    July 5: NO MEETING, off for the 4th of July Holiday    August 2: The Blue Zones, Secrets for Living a Longer Life by Quinn Davenport (book bites discussion)      If you would like bariatric surgery specific support group info please let your care team know.         Thank you,   [facility] Comprehensive Weight Management Team                              "

## 2024-01-19 NOTE — LETTER
2024       RE: Holli Saxena  [address]  [place] [address]     Dear Colleague,    Thank you for referring your patient, Holli Saxena, to the [facility]NA[facility] [place] [facility]. Please see a copy of my visit note below.    Virtual Visit Details    Type of service:  Video Visit     Originating Location (pt. Location): Home    Distant Location (provider location):  Off-site  Platform used for Video Visit: [facility] Medical Weight Management Note     Holli Saxena  MRN:  [id number]  :  1975  ALIA:  2024    Dear Freya Scales MD,    I had the pleasure of seeing your patient Holli Saxena. She is a 48 year old female who I am continuing to see for treatment of obesity related to:         No data to display                Assessment & Plan  Problem List Items Addressed This Visit       Class 2 severe obesity with serious comorbidity and body mass index (BMI) of 35.0 to 35.9 in adult (H)    Relevant Medications    tirzepatide (MOUNJARO) 7.5 MG/0.5ML pen    Type 2 diabetes mellitus (H)    Relevant Medications    tirzepatide (MOUNJARO) 7.5 MG/0.5ML pen      Transition to Mounjaro 7.5mg once weekly.   If not covered, will continue Ozempic 2.0mg once weekly. No refills needed   Can consider Zepbound in the future once on formulary   Follow up with Tsering Grijalva in 3 months     INTERVAL HISTORY:  First seen by Dr. Mahoney - 2010  Starting weight - 225lb  Started Metformin - stopped  Started Topiramate - discontinued due to side effect of fatigue   Started Phentermine - discontinued due to pregnancy   Weight gain due to pregnancy in 2018 - 264lbs  Started Contrave - stopped due to no longer helping  Restarted phentermine and topiramate  - discontinued during pandemic  Started Ozempic for type II diabetes   Mounjaro denied due to A1C <7%, increased Ozempic 2.0mg       Regained 4lb during  the holidays, has lost 2lbs in the past week. Feels like has been weight stable around 240lbs prior to the holidays    Anti-obesity medication history    Current:   Ozempic 2.0mg - overall has continued to be helpful. No side effects.     Past/Failed/contraindicated:   Metformin - not helpeful with weightloss. Denies side effects.   Topiramate - side effects of fatigure   Phentermine - Stopped helping with hunger suppression. Some constipation. Denies side effects.   Contrave - not helpful with weightloss. Denies side effects.     Recent diet changes: Harder though the holidays. Decrease in hunger and portion sizes. Focusing back on increase protein. Decrease in sugar. Restarted intermittent fasting - 17hours. Continuing to focus on vegetables. Having increase stress eating at times.     Recent exercise/activity changes: Very limited due to back pain, leg will go numb with prolonged walking. Will be seeing a neurosurgeon.     Recent stressors: Stable. Good support system.     Recent sleep changes: No concerns. Harder at time due to leg pain     Vitamins/Labs: A1C 6.0 on 11/19/2023     CURRENT WEIGHT:   244 lbs 9.6 oz    Initial Weight (lbs): 225 lbs  Last Visits Weight: 110.7 kg (244 lb)  Cumulative weight loss (lbs): -19.6  Weight Loss Percentage: -8.71%    Wt Readings from Last 5 Encounters:   01/19/24 110.9 kg (244 lb 9.6 oz)   08/30/23 112 kg (247 lb)   08/02/23 110.7 kg (244 lb)   04/26/23 113 kg (249 lb 3.2 oz)   11/30/22 112.2 kg (247 lb 4.8 oz)             1/16/2024    10:11 AM   Changes and Difficulties   With regards to my diet, I am still struggling with: Eating too many simple carbs   With regards to my activity/exercise, I am still struggling with: Sciatica.         MEDICATIONS:   Current Outpatient Medications   Medication Sig Dispense Refill    tirzepatide (MOUNJARO) 7.5 MG/0.5ML pen Inject 7.5 mg Subcutaneous every 7 days 6 mL 1    Flaxseed, Linseed, (FLAXSEED OIL PO) Take as directed      lisinopril  "(ZESTRIL) 20 MG tablet Take 1 tablet (20 mg) by mouth daily 90 tablet 1    LORazepam (ATIVAN) 0.5 MG tablet Take 1 tablet (0.5 mg) by mouth 2 times daily as needed for anxiety 20 tablet 0    Multiple Vitamin (MULTIVITAMINS PO) Take 1 tablet by mouth daily.      NIFEdipine ER OSMOTIC (PROCARDIA XL) 30 MG 24 hr tablet Take 1 tablet (30 mg) by mouth daily 90 tablet 1    Semaglutide, 2 MG/DOSE, (OZEMPIC) 8 MG/3ML pen Inject 2 mg Subcutaneous every 7 days 9 mL 3           1/16/2024    10:11 AM   Weight Loss Medication History Reviewed With Patient   Which weight loss medications are you currently taking on a regular basis? Ozempic   Are you having any side effects from the weight loss medication that we have prescribed you? No         Objective   Ht 1.778 m (5' 10\")   Wt 110.9 kg (244 lb 9.6 oz)   BMI 35.10 kg/m      Vitals - Patient Reported  Pain Score: Severe Pain (6)  Pain Loc: Low Back      PHYSICAL EXAM:    GENERAL: alert and no distress  EYES: Eyes grossly normal to inspection.  No discharge or erythema, or obvious scleral/conjunctival abnormalities.  RESP: No audible wheeze, cough, or visible cyanosis.    SKIN: Visible skin clear. No significant rash, abnormal pigmentation or lesions.  NEURO: Cranial nerves grossly intact.  Mentation and speech appropriate for age.  PSYCH: Appropriate affect, tone, and pace of words        Sincerely,    Tsering Alfaro PA-C      25 minutes spent by me on the date of the encounter doing chart review, history and exam, documentation and further activities per the note    "

## 2024-01-20 ENCOUNTER — MEDICAL CORRESPONDENCE (OUTPATIENT)
Dept: HEALTH INFORMATION MANAGEMENT | Facility: CLINIC | Age: 49
End: 2024-01-20
Payer: COMMERCIAL

## 2024-01-22 ENCOUNTER — TELEPHONE (OUTPATIENT)
Dept: ENDOCRINOLOGY | Facility: CLINIC | Age: 49
End: 2024-01-22
Payer: COMMERCIAL

## 2024-01-22 NOTE — TELEPHONE ENCOUNTER
Left Voicemail (1st Attempt) for the patient to call back and schedule the following:    Appointment type: [facility]   Provider: Tsering Alfaro PA-C  Return date: 3  mos ( 04/19/2024 )  Specialty phone number: [phone]  Additional appointment(s) needed: no   Additonal Notes: no

## 2024-01-31 NOTE — TELEPHONE ENCOUNTER
SPINE PATIENTS - NEW PROTOCOL PREVISIT    RECORDS RECEIVED FROM: Referring Dr. Reginald Koch   REASON FOR VISIT: Lower back pain   Hip pain   Spondylolisthesis, grade 2      Date of Appt: 02/06/2024   NOTES (FOR ALL VISITS) STATUS DETAILS   OFFICE NOTE from referring provider Internal Referral and notes in chart   OFFICE NOTE from other specialist N/A    DISCHARGE SUMMARY from hospital N/A    DISCHARGE REPORT from ER N/A    OPERATIVE REPORT N/A    EMG REPORT N/A    MEDICATION LIST N/A    IMAGING  (FOR ALL VISITS)     MRI (HEAD, NECK, SPINE) In chart w/report MRI lumbar - 01/10/2024 w/[facility]    XRAY (SPINE) *NEUROSURGERY* N/A    CT (HEAD, NECK, SPINE) N/A

## 2024-02-05 ENCOUNTER — TELEPHONE (OUTPATIENT)
Dept: NEUROSURGERY | Facility: CLINIC | Age: 49
End: 2024-02-05
Payer: COMMERCIAL

## 2024-02-05 NOTE — TELEPHONE ENCOUNTER
Appointment reminder message left for patient for her 2/6/24 appointment with Dr. Green at the [facility].

## 2024-02-06 ENCOUNTER — OFFICE VISIT (OUTPATIENT)
Dept: NEUROSURGERY | Facility: CLINIC | Age: 49
End: 2024-02-06
Attending: NEUROLOGICAL SURGERY
Payer: COMMERCIAL

## 2024-02-06 ENCOUNTER — PRE VISIT (OUTPATIENT)
Dept: NEUROSURGERY | Facility: CLINIC | Age: 49
End: 2024-02-06
Payer: COMMERCIAL

## 2024-02-06 ENCOUNTER — ANCILLARY PROCEDURE (OUTPATIENT)
Dept: GENERAL RADIOLOGY | Facility: CLINIC | Age: 49
End: 2024-02-06
Attending: NEUROLOGICAL SURGERY
Payer: COMMERCIAL

## 2024-02-06 VITALS
WEIGHT: 247 LBS | HEART RATE: 100 BPM | SYSTOLIC BLOOD PRESSURE: 136 MMHG | BODY MASS INDEX: 35.44 KG/M2 | DIASTOLIC BLOOD PRESSURE: 89 MMHG | OXYGEN SATURATION: 98 %

## 2024-02-06 DIAGNOSIS — M54.16 SPINAL STENOSIS OF LUMBAR REGION WITH RADICULOPATHY: Primary | ICD-10-CM

## 2024-02-06 DIAGNOSIS — M48.061 SPINAL STENOSIS OF LUMBAR REGION WITH RADICULOPATHY: ICD-10-CM

## 2024-02-06 DIAGNOSIS — M43.10 SPONDYLOLISTHESIS, GRADE 2: ICD-10-CM

## 2024-02-06 DIAGNOSIS — M48.061 SPINAL STENOSIS OF LUMBAR REGION WITH RADICULOPATHY: Primary | ICD-10-CM

## 2024-02-06 DIAGNOSIS — M54.16 SPINAL STENOSIS OF LUMBAR REGION WITH RADICULOPATHY: ICD-10-CM

## 2024-02-06 PROCEDURE — 99204 OFFICE O/P NEW MOD 45 MIN: CPT | Performed by: NEUROLOGICAL SURGERY

## 2024-02-06 PROCEDURE — 72110 X-RAY EXAM L-2 SPINE 4/>VWS: CPT | Mod: TC | Performed by: RADIOLOGY

## 2024-02-06 PROCEDURE — 99212 OFFICE O/P EST SF 10 MIN: CPT | Performed by: NEUROLOGICAL SURGERY

## 2024-02-06 ASSESSMENT — PAIN SCALES - GENERAL: PAINLEVEL: SEVERE PAIN (7)

## 2024-02-06 NOTE — NURSING NOTE
"Holli Saxena is a 48 year old female who presents for:  No chief complaint on file.       Vitals:    Vitals:    02/06/24 1019   BP: 136/89   Pulse: 100   SpO2: 98%       BMI:  Estimated body mass index is 35.1 kg/m  as calculated from the following:    Height as of 1/19/24: 5' 10\" (1.778 m).    Weight as of 1/19/24: 244 lb 9.6 oz (110.9 kg).    Pain Score:  Severe Pain (7)        Amendo Phorn      "

## 2024-02-06 NOTE — PROGRESS NOTES
It was a pleasure to see Holli Saxena today in Neurosurgery Clinic. She is a 48 year old female who is here for evaluation of her back and left lower extremity symptoms.    She states that these have been going on for approximately 10 years since her son was born.    She has done chiropractic with Dr. Koch.  She has not done physical therapy or epidural steroid injections in recent past.    Her symptoms include pain in the left low back that radiates down the side of the thigh and occasionally to the top of the foot.  She has minimal symptoms on the right.    Her symptoms are worse with standing or walking.    Past Medical History:   Diagnosis Date    Hx of previous reproductive problem     Hyperlipidemia     Obesity, unspecified     PCOS (polycystic ovarian syndrome)     Type 2 diabetes mellitus (H) 2022    Unspecified essential hypertension      Past Surgical History:   Procedure Laterality Date     SECTION N/A 2015    Procedure:  SECTION;  Surgeon: Meena Arnold MD;  Location:  L+D    NO HISTORY OF SURGERY        No Known Allergies    Current Outpatient Medications:     Flaxseed, Linseed, (FLAXSEED OIL PO), Take as directed, Disp: , Rfl:     lisinopril (ZESTRIL) 20 MG tablet, Take 1 tablet (20 mg) by mouth daily, Disp: 90 tablet, Rfl: 1    LORazepam (ATIVAN) 0.5 MG tablet, Take 1 tablet (0.5 mg) by mouth 2 times daily as needed for anxiety, Disp: 20 tablet, Rfl: 0    Multiple Vitamin (MULTIVITAMINS PO), Take 1 tablet by mouth daily., Disp: , Rfl:     NIFEdipine ER OSMOTIC (PROCARDIA XL) 30 MG 24 hr tablet, Take 1 tablet (30 mg) by mouth daily, Disp: 90 tablet, Rfl: 1    tirzepatide (MOUNJARO) 7.5 MG/0.5ML pen, Inject 7.5 mg Subcutaneous every 7 days, Disp: 6 mL, Rfl: 1  Social History     Socioeconomic History    Marital status:      Spouse name: None    Number of children: None    Years of education: None    Highest education level: None   Occupational History     Occupation:      Employer: [employer]   Tobacco Use    Smoking status: Never    Smokeless tobacco: Never   Vaping Use    Vaping Use: Never used   Substance and Sexual Activity    Alcohol use: No    Drug use: No    Sexual activity: Yes     Partners: Male     Birth control/protection: Pill   Social History Narrative    How much exercise per week? walking    How much calcium per day? One glass milk per day, vitamin       How much caffeine per day? 3 cups per week    How much vitamin D per day? Multi-vitamin    Do you/your family wear seatbelts?  Yes    Do you/your family use safety helmets? No activities requiring use    Do you/your family use sunscreen? Yes    Do you/your family keep firearms in the home? Yes    Do you/your family have a smoke detector(s)? Yes        Do you feel safe in your home? Yes    Has anyone ever touched you in an unwanted manner? No     Explain     MEHNAZ Gleason June 24, 2014 11:44 AM                     Social Determinants of Health     Financial Resource Strain: Low Risk  (9/23/2023)    Financial Resource Strain     Within the past 12 months, have you or your family members you live with been unable to get utilities (heat, electricity) when it was really needed?: No   Food Insecurity: Low Risk  (9/23/2023)    Food Insecurity     Within the past 12 months, did you worry that your food would run out before you got money to buy more?: No     Within the past 12 months, did the food you bought just not last and you didn t have money to get more?: No   Transportation Needs: Low Risk  (9/23/2023)    Transportation Needs     Within the past 12 months, has lack of transportation kept you from medical appointments, getting your medicines, non-medical meetings or appointments, work, or from getting things that you need?: No   Housing Stability: Low Risk  (9/23/2023)    Housing Stability     Do you have housing? : Yes     Are you worried about losing your housing?: No      Problem (# of  "Occurrences) Relation (Name,Age of Onset)    Alzheimer Disease (1) Paternal Grandmother    Cardiovascular (2) Mother, Paternal Grandfather    Diabetes (2) Mother: type 2, Father: type 2    Gynecology (1) Sister: PCOS    Hypertension (2) Father, Brother    Cerebrovascular Disease (1) Paternal Grandfather    Obesity (4) Mother, Father, Sister (Sunni), Brother (Eric)    C.A.D. (1) Brother: mild MI           Negative family history of: Breast Cancer, Ovarian Cancer             ROS: 10 point ROS neg other than the symptoms noted above in the HPI.    Vitals:    02/06/24 1019   BP: 136/89   Pulse: 100   SpO2: 98%   Weight: 112 kg (247 lb)     Estimated body mass index is 35.44 kg/m  as calculated from the following:    Height as of 1/19/24: 1.778 m (5' 10\").    Weight as of this encounter: 112 kg (247 lb).  Severe Pain (7)    Oswestry (ROBERT) Questionnaire        2/6/2024    10:00 AM   OSWESTRY DISABILITY INDEX   Count 9   Sum 22   Oswestry Score (%) 48.89 %       Visual Analog Scale (VAS) Questionnaire        2/5/2024     6:57 AM   VISUAL ANALOG PAIN SCALE   Back Pain Scale 0-10 7.5          Awake alert and oriented.  Posture erect without obvious deformity.  Palpable step-off at L4-5.  Bilateral lower extremity strength is 5 out of 5  Reflexes symmetric and normal  Sensation intact to light touch.    Imaging: Review of her lumbar MRI shows L4-5 spondylolisthesis with some stenosis.  The imaging was reviewed the patient showed the patient clinic today.    Assessment: L4-5 spondylolisthesis and stenosis with radiculopathy.    Plan: I have recommended physical therapy and an epidural steroid injection as initial measures for treatment.  If she does not improve with these I would consider L4-5 fusion for her in the future.  She is interested in conservative measures for now and we will see her back as needed depending on her response to physical therapy and an injection.     "

## 2024-02-06 NOTE — PATIENT INSTRUCTIONS
Patient Next Steps:    Order placed for epidural steroid injection  The steroid can take 10-14 days to reach max effect  Please call our clinic if symptoms persist after this timeframe.  You can call to schedule injection at [phone]    Order placed for physical therapy. You can call the phone number highlighted in the order to schedule your appointment. Please call our clinic if symptoms persist after your course of physical therapy.  If you have not heard from the scheduling office within 2 business days, please call [phone] for [facility], [phone] for [employer] and [phone] for [facility] [place].    Order placed for XR to be completed today     You can follow up with Dr. Green as needed     Please call us if you have any further questions or concerns.    [facility]surgery [facility]   Phone: [phone]  Fax: [phone]

## 2024-02-06 NOTE — LETTER
2024         RE: Holli Saxena  [address]  [facility] [address]        Dear Colleague,    Thank you for referring your patient, Holli Saxena, to the [facility] [place]. Please see a copy of my visit note below.    It was a pleasure to see Holli Saxena today in Neurosurgery Clinic. She is a 48 year old female who is here for evaluation of her back and left lower extremity symptoms.    She states that these have been going on for approximately 10 years since her son was born.    She has done chiropractic with Dr. Koch.  She has not done physical therapy or epidural steroid injections in recent past.    Her symptoms include pain in the left low back that radiates down the side of the thigh and occasionally to the top of the foot.  She has minimal symptoms on the right.    Her symptoms are worse with standing or walking.    Past Medical History:   Diagnosis Date     Hx of previous reproductive problem      Hyperlipidemia      Obesity, unspecified      PCOS (polycystic ovarian syndrome)      Type 2 diabetes mellitus (H) 2022     Unspecified essential hypertension      Past Surgical History:   Procedure Laterality Date      SECTION N/A 2015    Procedure:  SECTION;  Surgeon: Meena Arnold MD;  Location:  L+D     NO HISTORY OF SURGERY        No Known Allergies    Current Outpatient Medications:      Flaxseed, Linseed, (FLAXSEED OIL PO), Take as directed, Disp: , Rfl:      lisinopril (ZESTRIL) 20 MG tablet, Take 1 tablet (20 mg) by mouth daily, Disp: 90 tablet, Rfl: 1     LORazepam (ATIVAN) 0.5 MG tablet, Take 1 tablet (0.5 mg) by mouth 2 times daily as needed for anxiety, Disp: 20 tablet, Rfl: 0     Multiple Vitamin (MULTIVITAMINS PO), Take 1 tablet by mouth daily., Disp: , Rfl:      NIFEdipine ER OSMOTIC (PROCARDIA XL) 30 MG 24 hr tablet, Take 1 tablet (30 mg) by mouth daily, Disp: 90 tablet, Rfl: 1     tirzepatide (MOUNJARO)  7.5 MG/0.5ML pen, Inject 7.5 mg Subcutaneous every 7 days, Disp: 6 mL, Rfl: 1  Social History     Socioeconomic History     Marital status:      Spouse name: None     Number of children: None     Years of education: None     Highest education level: None   Occupational History     Occupation:      Employer: [employer]   Tobacco Use     Smoking status: Never     Smokeless tobacco: Never   Vaping Use     Vaping Use: Never used   Substance and Sexual Activity     Alcohol use: No     Drug use: No     Sexual activity: Yes     Partners: Male     Birth control/protection: Pill   Social History Narrative    How much exercise per week? walking    How much calcium per day? One glass milk per day, vitamin       How much caffeine per day? 3 cups per week    How much vitamin D per day? Multi-vitamin    Do you/your family wear seatbelts?  Yes    Do you/your family use safety helmets? No activities requiring use    Do you/your family use sunscreen? Yes    Do you/your family keep firearms in the home? Yes    Do you/your family have a smoke detector(s)? Yes        Do you feel safe in your home? Yes    Has anyone ever touched you in an unwanted manner? No     Explain     MEHNAZ Gleason June 24, 2014 11:44 AM                     Social Determinants of Health     Financial Resource Strain: Low Risk  (9/23/2023)    Financial Resource Strain      Within the past 12 months, have you or your family members you live with been unable to get utilities (heat, electricity) when it was really needed?: No   Food Insecurity: Low Risk  (9/23/2023)    Food Insecurity      Within the past 12 months, did you worry that your food would run out before you got money to buy more?: No      Within the past 12 months, did the food you bought just not last and you didn t have money to get more?: No   Transportation Needs: Low Risk  (9/23/2023)    Transportation Needs      Within the past 12 months, has lack of transportation kept you  "from medical appointments, getting your medicines, non-medical meetings or appointments, work, or from getting things that you need?: No   Housing Stability: Low Risk  (9/23/2023)    Housing Stability      Do you have housing? : Yes      Are you worried about losing your housing?: No      Problem (# of Occurrences) Relation (Name,Age of Onset)    Alzheimer Disease (1) Paternal Grandmother    Cardiovascular (2) Mother, Paternal Grandfather    Diabetes (2) Mother: type 2, Father: type 2    Gynecology (1) Sister: PCOS    Hypertension (2) Father, Brother    Cerebrovascular Disease (1) Paternal Grandfather    Obesity (4) Mother, Father, Sister (Sunni), Brother (Eric)    C.A.D. (1) Brother: mild MI           Negative family history of: Breast Cancer, Ovarian Cancer             ROS: 10 point ROS neg other than the symptoms noted above in the HPI.    Vitals:    02/06/24 1019   BP: 136/89   Pulse: 100   SpO2: 98%   Weight: 112 kg (247 lb)     Estimated body mass index is 35.44 kg/m  as calculated from the following:    Height as of 1/19/24: 1.778 m (5' 10\").    Weight as of this encounter: 112 kg (247 lb).  Severe Pain (7)    Oswestry (ROBERT) Questionnaire        2/6/2024    10:00 AM   OSWESTRY DISABILITY INDEX   Count 9   Sum 22   Oswestry Score (%) 48.89 %       Visual Analog Scale (VAS) Questionnaire        2/5/2024     6:57 AM   VISUAL ANALOG PAIN SCALE   Back Pain Scale 0-10 7.5          Awake alert and oriented.  Posture erect without obvious deformity.  Palpable step-off at L4-5.  Bilateral lower extremity strength is 5 out of 5  Reflexes symmetric and normal  Sensation intact to light touch.    Imaging: Review of her lumbar MRI shows L4-5 spondylolisthesis with some stenosis.  The imaging was reviewed the patient showed the patient clinic today.    Assessment: L4-5 spondylolisthesis and stenosis with radiculopathy.    Plan: I have recommended physical therapy and an epidural steroid injection as initial measures " for treatment.  If she does not improve with these I would consider L4-5 fusion for her in the future.  She is interested in conservative measures for now and we will see her back as needed depending on her response to physical therapy and an injection.         Again, thank you for allowing me to participate in the care of your patient.        Sincerely,        Ghassan Green MD

## 2024-02-28 ENCOUNTER — THERAPY VISIT (OUTPATIENT)
Dept: PHYSICAL THERAPY | Facility: CLINIC | Age: 49
End: 2024-02-28
Attending: NEUROLOGICAL SURGERY
Payer: COMMERCIAL

## 2024-02-28 DIAGNOSIS — M48.061 SPINAL STENOSIS OF LUMBAR REGION WITH RADICULOPATHY: ICD-10-CM

## 2024-02-28 DIAGNOSIS — M43.10 SPONDYLOLISTHESIS, GRADE 2: ICD-10-CM

## 2024-02-28 DIAGNOSIS — M54.16 SPINAL STENOSIS OF LUMBAR REGION WITH RADICULOPATHY: ICD-10-CM

## 2024-02-28 PROCEDURE — 97161 PT EVAL LOW COMPLEX 20 MIN: CPT | Mod: GP | Performed by: PHYSICAL THERAPIST

## 2024-02-28 PROCEDURE — 97110 THERAPEUTIC EXERCISES: CPT | Mod: GP | Performed by: PHYSICAL THERAPIST

## 2024-02-28 NOTE — PROGRESS NOTES
PHYSICAL THERAPY EVALUATION  Type of Visit: Evaluation  History of lumbar pain for 10 years following the birth of her son. Pt referred for physical therapy on 24  See electronic medical record for Abuse and Falls Screening details.    Subjective       Presenting condition or subjective complaint: Sciatica from L4/L5 issues  Date of onset:      Relevant medical history: Diabetes; High blood pressure; Overweight   Dates & types of surgery:  2015    Prior diagnostic imaging/testing results: MRI; X-ray     Prior therapy history for the same diagnosis, illness or injury: No      Prior Level of Function  Transfers: Independent  Ambulation: Independent  ADL: Independent  IADL: Childcare, Driving, Housekeeping, Work    Living Environment  Social support: With a significant other or spouse   Type of home: House   Stairs to enter the home: Yes 4 Is there a railing: Yes   Ramp: No   Stairs inside the home: Yes 12 Is there a railing: Yes   Help at home: Home management tasks (cooking, cleaning); Home and Yard maintenance tasks  Equipment owned:       Employment: Yes Manager  Hobbies/Interests: Gardening    Patient goals for therapy: Walk without leg numbness    Pain assessment: Pain present  Location: left lumbar radiating into the left lower extremity/Ratin/10     Objective   LUMBAR:    Posture: increased lordosis in standing    Gait: WNL    SL Balance:  R:  sec (R/L hip drop)  L:  sec (R/L hip drop)    Functional:  - Squat/ SL squat     Neurological:    Motor Deficit:  Myotomes L R   L1-2 (hip flexion)     L3 (knee extension)     L4 (ankle DF)     L5 (g. toe ext)     S1 (ankle PF or knee flex)       Sensory Deficit, Reflexes: reflexes, dermatomes and myotomes normal     Dural Signs:   L R   Slump     SLR         AROM: (Major, Moderate, Minimal or Nil loss)  Movement Loss Efra Mod Min Nil Pain   Flexion   x  Decreased pain   Extension  x   Increased pain    Side Gliding/Bend L   x     Side Gliding/Bend R    x           Lumbar Mobility/Spring Testing:     Palpation: point tenderness L4-L5 spinous processes    Other Tests: decreased flexibility bilateral hamstrings, decreased strength lower abdominals and pelvic stabilizers            Assessment & Plan   CLINICAL IMPRESSIONS  Medical Diagnosis: spondylolithesis, spinal stenosis    Treatment Diagnosis: lumbar pain, decreased ROM/strength   Impression/Assessment: Patient is a 48 year old female with lumbar  complaints.  The following significant findings have been identified: Pain, Decreased ROM/flexibility, and Decreased strength. These impairments interfere with their ability to perform self care tasks, work tasks, recreational activities, household chores, driving , household mobility, and community mobility as compared to previous level of function.     Clinical Decision Making (Complexity):  Clinical Presentation: Stable/Uncomplicated  Clinical Presentation Rationale: based on medical and personal factors listed in PT evaluation  Clinical Decision Making (Complexity): Low complexity    PLAN OF CARE  Treatment Interventions:  Modalities: Cryotherapy  Interventions: Therapeutic Exercise    Long Term Goals     PT Goal 1  Goal Identifier: ambulation  Goal Description: pt able to walk 1 mile pain level 2  Rationale: to maximize safety and independence with performance of ADLs and functional tasks;to maximize safety and independence within the home;to maximize safety and independence within the community;to maximize safety and independence with transportation;to maximize safety and independence with self cares  Target Date: 04/10/24      Frequency of Treatment: 1x/week  Duration of Treatment: 6 weeks    Recommended Referrals to Other Professionals:   Education Assessment:   Learner/Method: No Barriers to Learning    Risks and benefits of evaluation/treatment have been explained.   Patient/Family/caregiver agrees with Plan of Care.     Evaluation Time:             Signing  Clinician: Ridge De Los Santos, PT

## 2024-03-06 ENCOUNTER — RADIOLOGY INJECTION OFFICE VISIT (OUTPATIENT)
Dept: PALLIATIVE MEDICINE | Facility: CLINIC | Age: 49
End: 2024-03-06
Attending: NEUROLOGICAL SURGERY
Payer: COMMERCIAL

## 2024-03-06 VITALS — DIASTOLIC BLOOD PRESSURE: 83 MMHG | HEART RATE: 96 BPM | SYSTOLIC BLOOD PRESSURE: 125 MMHG | OXYGEN SATURATION: 93 %

## 2024-03-06 DIAGNOSIS — M43.10 SPONDYLOLISTHESIS, GRADE 2: ICD-10-CM

## 2024-03-06 DIAGNOSIS — M54.16 LUMBAR RADICULOPATHY: Primary | ICD-10-CM

## 2024-03-06 DIAGNOSIS — M54.16 SPINAL STENOSIS OF LUMBAR REGION WITH RADICULOPATHY: ICD-10-CM

## 2024-03-06 DIAGNOSIS — M48.061 SPINAL STENOSIS OF LUMBAR REGION WITH RADICULOPATHY: ICD-10-CM

## 2024-03-06 PROCEDURE — 62323 NJX INTERLAMINAR LMBR/SAC: CPT | Performed by: ANESTHESIOLOGY

## 2024-03-06 RX ORDER — TRIAMCINOLONE ACETONIDE 40 MG/ML
40 INJECTION, SUSPENSION INTRA-ARTICULAR; INTRAMUSCULAR ONCE
Status: COMPLETED | OUTPATIENT
Start: 2024-03-06 | End: 2024-03-06

## 2024-03-06 RX ADMIN — TRIAMCINOLONE ACETONIDE 40 MG: 40 INJECTION, SUSPENSION INTRA-ARTICULAR; INTRAMUSCULAR at 10:47

## 2024-03-06 NOTE — NURSING NOTE
Discharge Information    IV Discontiued Time:  NA    Amount of Fluid Infused:  NA    Discharge Criteria = When patient returns to baseline or as per MD order    Consciousness:  Pt is fully awake    Circulation:  BP +/- 20% of pre-procedure level    Respiration:  Patient is able to breathe deeply    O2 Sat:  Patient is able to maintain O2 Sat >92% on room air    Activity:  Moves 4 extremities on command    Ambulation:  Patient is able to stand and walk or stand and pivot into wheelchair    Dressing:  Clean/dry or No Dressing    Notes:   Discharge instructions and AVS given to patient    Patient meets criteria for discharge?  YES    Admitted to PCU?  No    Responsible adult present to accompany patient home?  Yes    Signature/Title:    Sunni Caicedo RN  RN Care Coordinator  [facility] [place]

## 2024-03-06 NOTE — PROGRESS NOTES
[facility] - Procedure Note    Date of Visit: 3/6/2024    Procedure performed: Lumbar L4-5 interlaminar epidural steroid injection  Diagnosis: Lumbar spondylosis; Lumbar radiculitis/radiculopathy  : Hanna Guzman MD  Anesthesia: none    Indications: Holli Saxena is a 48 year old female who is seen at the request of Dr. Green for a lumbar epidural steroid injection. The patient describes low back pain with radiation the legs L>R. The patient has been exhibiting symptoms consistent with lumbar intraspinal inflammation and radiculopathy. Symptoms have been persistent, disabling, and intermittently severe. The patient reports minimal improvement with conservative treatment, including PT and medications.    MRI LUMBAR SPINE was done @ [facility] on 1/18/2024 which showed:       Allergies:    No Known Allergies     Vitals:  /78 (BP Location: Left arm, Patient Position: Chair, Cuff Size: Adult Regular)   Pulse 89   SpO2 98%     Review of Systems: The patient denies recent fever, chills, illness, use of antibiotics or anticoagulants. All other 10-point review of systems negative.     Procedure: The procedure and risks were explained, and informed written consent was obtained from the patient. Risks include but are not limited to: infection, bleeding, increased pain, and damage to soft tissue, nerve, muscle, and vasculature structures. After getting informed consent, patient was brought into the procedure suite and was placed in a prone position on the procedure table. A Pause for the Cause was performed. Patient was prepped and draped in sterile fashion.     The L4-5 interspace was identified with use of fluoroscopy in AP view. A 25-gauge, 1.5 inch needle was used to anesthetize the skin and subcutaneous tissue entry site with a total of 2 ml of 1% lidocaine. Under fluoroscopic visualization, a 22-gauge, 3.5 inch Tuohy epidural needle was slowly advanced towards the epidural space  a few millimeters left of midline. The latter part of the needle advancement was guided with fluoroscopy in the lateral view. The epidural space was identified using loss of resistance technique. After negative aspiration for heme and cerebrospinal fluid, a total of 1 mL of non-ionic contrast was injected to confirm needle placement with 0 mL of contrast wasted. Epidurogram confirmed spread within the posterior epidural space. 2 ml of 40mg/ml of triamcinolone, 2 ml of 1% lidocaine, and 1 ml of preservative free saline was injected. The needle was removed.  Images were saved to PACS.    The patient tolerated the procedure well, and there was no evidence of procedural complications. No new sensory or motor deficits were noted following the procedure. The patient was stable and able to ambulate on discharge home. Post-procedure instructions were provided.     Pre-procedure pain score: 5/10 in the back, 5/10 in the leg  Post-procedure pain score: 1/10 in the back, 1/10 in the leg    Assessment/Plan: Holli Saxena is a 48 year old female s/p lumbar interlaminar epidural steroid injection today for lumbar spondylosis and radiculitis/radiculopathy.     1. Following today's procedure, the patient was advised to contact the Pain Management Center for any of the following:   Fever, chills, or night sweats   New onset of pain, numbness, or weakness   Any questions/concerns regarding the procedure  If unable to contact the Pain Center, the patient was instructed to go to a local Emergency Room for any complications.   2. Follow-up with the referring provider in 2 weeks for post-procedure evaluation.    MARIA ESTHER DIXON MD   Pain Management

## 2024-03-06 NOTE — PATIENT INSTRUCTIONS
[facility] Procedure Discharge Instructions    Today you saw:   Dr. Hanna Guzman     Your procedure:  Epidural steroid injection       Medications used:  Lidocaine (anesthetic)  Kenalog (steroid)  Omnipaque (contrast)    Saline       Be cautious when walking as numbness and/or weakness in the legs may occur up to 6-8 hours after the procedure due to effect of the local anesthetic  Do not drive for 6 hours. The effect of the local anesthetic could slow your reflexes.   Avoid strenuous activity for the first 24 hours. You may resume your regular activities after that.   You may shower, however avoid swimming, tub baths or hot tubs for 24 hours following your procedure  You may have a mild to moderate increase in pain for several days following the injection.    You may use ice packs for 10-15 minutes, 3 to 4 times a day at the injection site for comfort  Do not use heat to painful areas for 6 to 8 hours. This will give the local anesthetic time to wear off and prevent you from accidentally burning your skin.  Unless you have been directed to avoid the use of anti-inflammatory medications (NSAIDS-ibuprofen, Aleve, Motrin), you may use these medications or Tylenol for pain control if needed.   Possible side effects of steroids that you may experience include flushing, elevated blood pressure, increased appetite, mild headaches and restlessness.  All of these symptoms will get better with time.  It may take up to 14 days for the steroid medication to start working although you may feel the effect as early as a few days after the procedure.   Follow up with your referring provider (Dr Green) in 2-3 weeks    If you experience any of the following, call the pain center line during work hours at [phone] or on-call physician after hours at [phone]:  -Fever over 100 degree F  -Swelling, bleeding, redness, drainage, warmth at the injection site  -Progressive weakness or numbness in your legs or  arms  -Loss of bowel or bladder function  -Unusual headache that is not relieved by Tylenol or your regular headache medication  -Unusual new onset of pain that is not improving

## 2024-03-13 ENCOUNTER — THERAPY VISIT (OUTPATIENT)
Dept: PHYSICAL THERAPY | Facility: CLINIC | Age: 49
End: 2024-03-13
Attending: NEUROLOGICAL SURGERY
Payer: COMMERCIAL

## 2024-03-13 DIAGNOSIS — M54.16 SPINAL STENOSIS OF LUMBAR REGION WITH RADICULOPATHY: ICD-10-CM

## 2024-03-13 DIAGNOSIS — M43.10 SPONDYLOLISTHESIS, GRADE 2: Primary | ICD-10-CM

## 2024-03-13 DIAGNOSIS — M48.061 SPINAL STENOSIS OF LUMBAR REGION WITH RADICULOPATHY: ICD-10-CM

## 2024-03-13 PROCEDURE — 97110 THERAPEUTIC EXERCISES: CPT | Mod: GP | Performed by: PHYSICAL THERAPIST

## 2024-03-29 DIAGNOSIS — I10 HTN, GOAL BELOW 140/90: Primary | ICD-10-CM

## 2024-03-29 RX ORDER — NIFEDIPINE 30 MG/1
30 TABLET, EXTENDED RELEASE ORAL DAILY
Qty: 30 TABLET | Refills: 0 | Status: SHIPPED | OUTPATIENT
Start: 2024-03-29 | End: 2024-04-30

## 2024-04-10 ENCOUNTER — THERAPY VISIT (OUTPATIENT)
Dept: PHYSICAL THERAPY | Facility: CLINIC | Age: 49
End: 2024-04-10
Payer: COMMERCIAL

## 2024-04-10 DIAGNOSIS — M54.16 SPINAL STENOSIS OF LUMBAR REGION WITH RADICULOPATHY: ICD-10-CM

## 2024-04-10 DIAGNOSIS — M43.10 SPONDYLOLISTHESIS, GRADE 2: Primary | ICD-10-CM

## 2024-04-10 DIAGNOSIS — M48.061 SPINAL STENOSIS OF LUMBAR REGION WITH RADICULOPATHY: ICD-10-CM

## 2024-04-10 PROCEDURE — 97110 THERAPEUTIC EXERCISES: CPT | Mod: GP | Performed by: PHYSICAL THERAPIST

## 2024-04-21 ENCOUNTER — HEALTH MAINTENANCE LETTER (OUTPATIENT)
Age: 49
End: 2024-04-21

## 2024-04-29 DIAGNOSIS — I10 HTN, GOAL BELOW 140/90: ICD-10-CM

## 2024-04-30 RX ORDER — NIFEDIPINE 30 MG/1
30 TABLET, EXTENDED RELEASE ORAL DAILY
Qty: 30 TABLET | Refills: 0 | Status: SHIPPED | OUTPATIENT
Start: 2024-04-30

## 2024-05-08 ENCOUNTER — LAB (OUTPATIENT)
Dept: LAB | Facility: CLINIC | Age: 49
End: 2024-05-08
Payer: COMMERCIAL

## 2024-05-08 DIAGNOSIS — E11.9 TYPE 2 DIABETES MELLITUS WITHOUT COMPLICATION, WITHOUT LONG-TERM CURRENT USE OF INSULIN (H): ICD-10-CM

## 2024-05-08 LAB — HBA1C MFR BLD: 5.8 % (ref 0–5.6)

## 2024-05-08 PROCEDURE — 36415 COLL VENOUS BLD VENIPUNCTURE: CPT

## 2024-05-08 PROCEDURE — 83036 HEMOGLOBIN GLYCOSYLATED A1C: CPT

## 2024-05-30 ENCOUNTER — TRANSFERRED RECORDS (OUTPATIENT)
Dept: HEALTH INFORMATION MANAGEMENT | Facility: CLINIC | Age: 49
End: 2024-05-30
Payer: COMMERCIAL

## 2024-06-07 ENCOUNTER — VIRTUAL VISIT (OUTPATIENT)
Dept: ENDOCRINOLOGY | Facility: CLINIC | Age: 49
End: 2024-06-07
Payer: COMMERCIAL

## 2024-06-07 VITALS — BODY MASS INDEX: 34.59 KG/M2 | WEIGHT: 241.6 LBS | HEIGHT: 70 IN

## 2024-06-07 DIAGNOSIS — E66.01 CLASS 2 SEVERE OBESITY WITH SERIOUS COMORBIDITY AND BODY MASS INDEX (BMI) OF 35.0 TO 35.9 IN ADULT, UNSPECIFIED OBESITY TYPE (H): ICD-10-CM

## 2024-06-07 DIAGNOSIS — E11.9 TYPE 2 DIABETES MELLITUS WITHOUT COMPLICATION, WITHOUT LONG-TERM CURRENT USE OF INSULIN (H): ICD-10-CM

## 2024-06-07 DIAGNOSIS — E66.812 CLASS 2 SEVERE OBESITY WITH SERIOUS COMORBIDITY AND BODY MASS INDEX (BMI) OF 35.0 TO 35.9 IN ADULT, UNSPECIFIED OBESITY TYPE (H): ICD-10-CM

## 2024-06-07 PROCEDURE — 99213 OFFICE O/P EST LOW 20 MIN: CPT | Mod: 95

## 2024-06-07 PROCEDURE — G2211 COMPLEX E/M VISIT ADD ON: HCPCS | Mod: 95

## 2024-06-07 ASSESSMENT — PAIN SCALES - GENERAL: PAINLEVEL: NO PAIN (0)

## 2024-06-07 NOTE — NURSING NOTE
Is the patient currently in the state of [place]? YES    Visit mode:VIDEO    If the visit is dropped, the patient can be reconnected by: VIDEO VISIT: Text to cell phone:   Telephone Information:   Mobile [phone]       Will anyone else be joining the visit? NO  (If patient encounters technical issues they should call [phone])    How would you like to obtain your AVS? MyChart    Are changes needed to the allergy or medication list? No, Pt stated no changes to allergies, and Pt stated no med changes    Are refills needed on medications prescribed by this physician? NO    Reason for visit: RECHECK (RAY)    Marycarmen LIVE

## 2024-06-07 NOTE — PATIENT INSTRUCTIONS
"Mac De La Garza,   Thank you for allowing us the privilege of caring for you. We hope we provided you with the excellent service you deserve.   Please let us know if there is anything else we can do for you so that we can be sure you are completely satisfied with your care experience.    To ensure the quality of our services you may be receiving a patient satisfaction survey from an independent patient satisfaction monitoring company.    The greatest compliment you can give is a \"Likely to Recommend\"    Your visit was with Tsering Alfaro PA-C today.    Instructions per today's visit:     Increase Mounjaro 10mg once weekly   MTM pharmacist in 3 months  Tsering Grijalva in 6 months     ___________________________________________________________________________  Important contact and scheduling information:  Please call our contact center at [phone] to schedule your next appointments.  For any nursing questions or concerns call Zaynab Mora LPN at [phone] or Lissy Davis RN at [phone]  Please call during clinic hours Monday through Friday 8:00a - 4:00p if you have questions or you can contact us via ProTip at anytime and we will reply during clinic hours.    Lab results will be communicated through My Chart or letter (if My Chart not used). Please call the clinic if you have not received communication after 1 week or if you have any questions.?  Clinic Fax: [phone]  __________________________________________________________________________    If labs were ordered today:    Please make an appointment to have them drawn at your convenience.     To schedule the Lab Appointment using ProTip:  Select \"Schedule an Appointment\"  Select \"Lab Only\"  For \"A couple of questions\", select \"Other\"  For \"Which locations work for you?, select the location and set up the appointment    To schedule by phone call [phone] to schedule a lab only appointment at [facility]" lab.  ___________________________________________________________________________  Work with A Health !  Virtual Sessions are Available through [facility] Clinics    To learn more, call to schedule a free, Health  Q&A appointment: [phone]     What is Health Coaching?  Do you know what you are supposed to do, but you just aren't doing it?  Then, HEALTH COACHING may help you!   Get unstuck and move forward with the support of a professionally trained NBC-HWC (National Board-Certified Health and ) who uses evidence-based approaches to help you move forward with healthy lifestyle changes in the areas of weight loss, stress management and overall well-being.    Health Coaches help you identify goals that will work best for you. Health Coaches provide support and encouragement with overcoming barriers and help you to find inspiration and motivation to lead a healthy lifestyle.    Option one:  Health Coaching 3-Pack; Three, 30-minute Health Coaching Visits, for $99  Visits are done virtually (phone or video)  This is a self pay service; we do not accept insurance for dwayne coaching.    Option two:   The 24 week Plan; 11 Health Coaching Visits, and a 7 months subscription to [employer]-- on-demand fitness, nutrition and mindfulness classes, for $499 (employee discounts may be available). Participants will also meet regularly with a weight management Medical Provider and a Registered/Licensed Dietician.  This is a self-pay service; we do not accept insurance for health coaching.    To Schedule a free Health  Q&A appointment to learn more,  call [phone].  ____________________________________________________________________  [facility]  Healthy Lifestyle Group    Healthy Lifestyle Group  This is a 60 minute virtual coaching group for those who want to lead a healthier lifestyle. Come together to set goals and overcome  "barriers in a supportive group environment. We will address the four pillars of health--nutrition, exercise, sleep and emotional well-being.  This group is highly recommended for those who are participating in the 24 week Healthy Lifestyle Plan and our Health Coaching sessions.    WHEN: This group meets the first Friday of the month, 12:30 PM - 1:30 PM online, via a zoom meeting.      FACILITATOR: Led by National Board Certified Health and , Citlali Armenta [facility].    TO REGISTER: Please call the Call Center at [phone] to register. You will get an appointment to attend in [place]. Fifteen minutes prior to the meeting, complete the e-check in and you will get the link to join the meeting.  There is no charge to attend this group and space is limited.      2023 and 2024 Meeting Topics and Dates:    November 3: Introduction to Mindfulness (Learn simple and effective mindfulness practices and how it can benefit you)    December 8: Let's Talk (guided discussion on our wins and challenges)    January 5: New Years Vision: Manifest your Best 2024! (Guided imagery,  journaling and discussion)    February 2: Let's Talk    March 1: 10 Percent Happier by Quinn Amezquita (Book Bites; a guided discussion on the nuggets of wisdom from favorite wellness books; no need to read the book but highly encouraged)    April 5: Let's Talk    May 3: \"Essentialism; The Disciplined Pursuit of Less by Tej Bowers (book bites discussion)    June 7: Let's Talk    July 5: NO MEETING, off for the 4th of July Holiday    August 2: The Blue Zones, Secrets for Living a Longer Life by Quinn Davenport (book bites discussion)      If you would like bariatric surgery specific support group info please let your care team know.         Thank you,   [facility] Comprehensive Weight Management Team                          "

## 2024-06-07 NOTE — PROGRESS NOTES
Virtual Visit Details    Type of service:  Video Visit     Originating Location (pt. Location): Home    Distant Location (provider location):  Off-site  Platform used for Video Visit: [facility] Medical Weight Management Note     Holli Saxena  MRN:  [id number]  :  1975  ALIA:  2024    Dear Freya Scales MD,    I had the pleasure of seeing your patient Holli Saxena. She is a 49 year old female who I am continuing to see for treatment of obesity related to:         No data to display                Assessment & Plan   Problem List Items Addressed This Visit       Class 2 severe obesity with serious comorbidity and body mass index (BMI) of 35.0 to 35.9 in adult (H)    Relevant Medications    tirzepatide (MOUNJARO) 10 MG/0.5ML pen    Other Relevant Orders    Med Therapy Management Referral    Type 2 diabetes mellitus (H) - Primary    Relevant Medications    tirzepatide (MOUNJARO) 10 MG/0.5ML pen      Increase Mounjaro 10mg once weekly   MTM pharmacist in 3 months  Tsering Grijalva in 6 months       INTERVAL HISTORY:  First seen by Dr. Mahoney - 2010  Starting weight - 225lb  Metformin - stopped   Topiramate - discontinued due to side effect of fatigue   Phentermine - discontinued due to pregnancy   Weight gain due to pregnancy in 2018 - 264lbs  Contrave - stopped due to no longer helping  Restarted phentermine and topiramate  - discontinued during pandemic  Started Ozempic for type II diabetes   Mounjaro denied due to A1C <7%, increased Ozempic 2.0mg   Mounjaro approved and transitioned to 7.5mg       Has seen weight loss since last visit, which has been very encouraging.     Anti-obesity medication history    Current:   Mounjaro 7.5mg - Will have diarrhea and upset stomach day after injection at times. Has been more helpful then Ozempic. Seeing some more help with hunger and portion control.       Recent diet changes: Continues intermittent fasting - 17hr fast. Eating 3 meals a day.  But still having increase snacking, especially with increase stress.     Recent exercise/activity changes: Recently has been having some back pain. Got an injection, with some relief. Will get leg numbness. This has really limited mobility. Went to PT, and continues these exercises at home. Thinks she might need surgery in the future, but trying to avoid if possible.     Recent stressors: increase stress at work     Recent sleep changes: Stable    Vitamins/Labs: A1C 5.8 on 5/8/2024.     CURRENT WEIGHT:   241 lbs 9.6 oz    Initial Weight (lbs): 225 lbs  Last Visits Weight: 111 kg (244 lb 9.6 oz)  Cumulative weight loss (lbs): -16.6  Weight Loss Percentage: -7.38%    Wt Readings from Last 5 Encounters:   06/07/24 109.6 kg (241 lb 9.6 oz)   02/06/24 112 kg (247 lb)   01/19/24 110.9 kg (244 lb 9.6 oz)   08/30/23 112 kg (247 lb)   08/02/23 110.7 kg (244 lb)             6/4/2024    11:01 AM   Changes and Difficulties   I have made the following changes to my diet since my last visit: Increased fasting to 17/7.   With regards to my diet, I am still struggling with: Sugar cravings   I have made the following changes to my activity/exercise since my last visit: Lower back exercises from physical therapy   With regards to my activity/exercise, I am still struggling with: Moving for an extended time due to lower back issues         MEDICATIONS:   Current Outpatient Medications   Medication Sig Dispense Refill    Flaxseed, Linseed, (FLAXSEED OIL PO) Take as directed      lisinopril (ZESTRIL) 20 MG tablet Take 1 tablet (20 mg) by mouth daily 90 tablet 1    LORazepam (ATIVAN) 0.5 MG tablet Take 1 tablet (0.5 mg) by mouth 2 times daily as needed for anxiety 20 tablet 0    Multiple Vitamin (MULTIVITAMINS PO) Take 1 tablet by mouth daily.      NIFEdipine ER OSMOTIC (PROCARDIA XL) 30 MG 24 hr tablet TAKE 1 TABLET BY MOUTH DAILY. 30 tablet 0    tirzepatide (MOUNJARO) 10 MG/0.5ML pen Inject 10 mg Subcutaneous every 7 days 6 mL 1     "tirzepatide (MOUNJARO) 7.5 MG/0.5ML pen Inject 7.5 mg Subcutaneous every 7 days 6 mL 1           1/16/2024    10:11 AM   Weight Loss Medication History Reviewed With Patient   Which weight loss medications are you currently taking on a regular basis? Ozempic   Are you having any side effects from the weight loss medication that we have prescribed you? No       PHYSICAL EXAM:  Objective    Ht 1.778 m (5' 10\")   Wt 109.6 kg (241 lb 9.6 oz)   BMI 34.67 kg/m      Vitals - Patient Reported  Pain Score: No Pain (0)      Vitals:  No vitals were obtained today due to virtual visit.    GENERAL: alert and no distress  EYES: Eyes grossly normal to inspection.  No discharge or erythema, or obvious scleral/conjunctival abnormalities.  RESP: No audible wheeze, cough, or visible cyanosis.    SKIN: Visible skin clear. No significant rash, abnormal pigmentation or lesions.  NEURO: Cranial nerves grossly intact.  Mentation and speech appropriate for age.  PSYCH: Appropriate affect, tone, and pace of words        Sincerely,    Tsering Alfaro PA-C      22 minutes spent by me on the date of the encounter doing chart review, history and exam, documentation and further activities per the note    The longitudinal plan of care for the diagnosis(es)/condition(s) as documented were addressed during this visit. Due to the added complexity in care, I will continue to support Holli in the subsequent management and with ongoing continuity of care.  "

## 2024-06-07 NOTE — LETTER
2024       RE: Holli Saxena  [address]  [place] [address]     Dear Colleague,    Thank you for referring your patient, Holli Saxena, to the [facility] [place] [facility]. Please see a copy of my visit note below.    Virtual Visit Details    Type of service:  Video Visit     Originating Location (pt. Location): Home    Distant Location (provider location):  Off-site  Platform used for Video Visit: [facility] Medical Weight Management Note     Holli Saxena  MRN:  [id number]  :  1975  ALIA:  2024    Dear Freya Scales MD,    I had the pleasure of seeing your patient Holli Saxena. She is a 49 year old female who I am continuing to see for treatment of obesity related to:         No data to display                Assessment & Plan  Problem List Items Addressed This Visit       Class 2 severe obesity with serious comorbidity and body mass index (BMI) of 35.0 to 35.9 in adult (H)    Relevant Medications    tirzepatide (MOUNJARO) 10 MG/0.5ML pen    Other Relevant Orders    Med Therapy Management Referral    Type 2 diabetes mellitus (H) - Primary    Relevant Medications    tirzepatide (MOUNJARO) 10 MG/0.5ML pen      Increase Mounjaro 10mg once weekly   MTM pharmacist in 3 months  Tsering Grijalva in 6 months       INTERVAL HISTORY:  First seen by Dr. Mahoney - 2010  Starting weight - 225lb  Metformin - stopped   Topiramate - discontinued due to side effect of fatigue   Phentermine - discontinued due to pregnancy   Weight gain due to pregnancy in 2018 - 264lbs  Contrave - stopped due to no longer helping  Restarted phentermine and topiramate  - discontinued during pandemic  Started Ozempic for type II diabetes   Mounjaro denied due to A1C <7%, increased Ozempic 2.0mg   Mounjaro approved and transitioned to 7.5mg       Has seen weight loss since last visit, which has been very  encouraging.     Anti-obesity medication history    Current:   Mounjaro 7.5mg - Will have diarrhea and upset stomach day after injection at times. Has been more helpful then Ozempic. Seeing some more help with hunger and portion control.       Recent diet changes: Continues intermittent fasting - 17hr fast. Eating 3 meals a day. But still having increase snacking, especially with increase stress.     Recent exercise/activity changes: Recently has been having some back pain. Got an injection, with some relief. Will get leg numbness. This has really limited mobility. Went to PT, and continues these exercises at home. Thinks she might need surgery in the future, but trying to avoid if possible.     Recent stressors: increase stress at work     Recent sleep changes: Stable    Vitamins/Labs: A1C 5.8 on 5/8/2024.     CURRENT WEIGHT:   241 lbs 9.6 oz    Initial Weight (lbs): 225 lbs  Last Visits Weight: 111 kg (244 lb 9.6 oz)  Cumulative weight loss (lbs): -16.6  Weight Loss Percentage: -7.38%    Wt Readings from Last 5 Encounters:   06/07/24 109.6 kg (241 lb 9.6 oz)   02/06/24 112 kg (247 lb)   01/19/24 110.9 kg (244 lb 9.6 oz)   08/30/23 112 kg (247 lb)   08/02/23 110.7 kg (244 lb)             6/4/2024    11:01 AM   Changes and Difficulties   I have made the following changes to my diet since my last visit: Increased fasting to 17/7.   With regards to my diet, I am still struggling with: Sugar cravings   I have made the following changes to my activity/exercise since my last visit: Lower back exercises from physical therapy   With regards to my activity/exercise, I am still struggling with: Moving for an extended time due to lower back issues         MEDICATIONS:   Current Outpatient Medications   Medication Sig Dispense Refill    Flaxseed, Linseed, (FLAXSEED OIL PO) Take as directed      lisinopril (ZESTRIL) 20 MG tablet Take 1 tablet (20 mg) by mouth daily 90 tablet 1    LORazepam (ATIVAN) 0.5 MG tablet Take 1 tablet  "(0.5 mg) by mouth 2 times daily as needed for anxiety 20 tablet 0    Multiple Vitamin (MULTIVITAMINS PO) Take 1 tablet by mouth daily.      NIFEdipine ER OSMOTIC (PROCARDIA XL) 30 MG 24 hr tablet TAKE 1 TABLET BY MOUTH DAILY. 30 tablet 0    tirzepatide (MOUNJARO) 10 MG/0.5ML pen Inject 10 mg Subcutaneous every 7 days 6 mL 1    tirzepatide (MOUNJARO) 7.5 MG/0.5ML pen Inject 7.5 mg Subcutaneous every 7 days 6 mL 1           1/16/2024    10:11 AM   Weight Loss Medication History Reviewed With Patient   Which weight loss medications are you currently taking on a regular basis? Ozempic   Are you having any side effects from the weight loss medication that we have prescribed you? No       PHYSICAL EXAM:  Objective   Ht 1.778 m (5' 10\")   Wt 109.6 kg (241 lb 9.6 oz)   BMI 34.67 kg/m      Vitals - Patient Reported  Pain Score: No Pain (0)      Vitals:  No vitals were obtained today due to virtual visit.    GENERAL: alert and no distress  EYES: Eyes grossly normal to inspection.  No discharge or erythema, or obvious scleral/conjunctival abnormalities.  RESP: No audible wheeze, cough, or visible cyanosis.    SKIN: Visible skin clear. No significant rash, abnormal pigmentation or lesions.  NEURO: Cranial nerves grossly intact.  Mentation and speech appropriate for age.  PSYCH: Appropriate affect, tone, and pace of words        Sincerely,    Tsering Alfaro PA-C      22 minutes spent by me on the date of the encounter doing chart review, history and exam, documentation and further activities per the note    The longitudinal plan of care for the diagnosis(es)/condition(s) as documented were addressed during this visit. Due to the added complexity in care, I will continue to support Holli in the subsequent management and with ongoing continuity of care.  "

## 2024-06-11 ENCOUNTER — TELEPHONE (OUTPATIENT)
Dept: ENDOCRINOLOGY | Facility: CLINIC | Age: 49
End: 2024-06-11
Payer: COMMERCIAL

## 2024-06-11 NOTE — TELEPHONE ENCOUNTER
MTM referral from: [facility] visit (referral by provider)    MTM referral outreach attempt #2 on June 11, 2024 at 10:10 AM      Outcome: Patient not reachable after several attempts, routed to Pharmacist Team/Provider as an FYI    Use hbc for the carrier/Plan on the flowsheet      [employer] Message Sent    BINA Damon

## 2024-06-14 ENCOUNTER — TELEPHONE (OUTPATIENT)
Dept: ENDOCRINOLOGY | Facility: CLINIC | Age: 49
End: 2024-06-14
Payer: COMMERCIAL

## 2024-06-14 NOTE — TELEPHONE ENCOUNTER
Left Voicemail (1st Attempt) and Sent Mychart (1st Attempt) for the patient to call back and schedule the following:    Appointment type: [facility]ILI BELL   Provider: Tsering Alfaro PA-C  Return date: 6 mos   Specialty phone number: [phone]  Additional appointment(s) needed: n/a  Additonal Notes: n/a

## 2024-06-14 NOTE — TELEPHONE ENCOUNTER
MTM Referral outreach attempt #3 was made on 06/14/2024    Outcome: Sent patient MyChart message explaining more in-depth what MTM is and how we can best support them. Attached ability to schedule from message, as well as offered opportunity to call me directly for help with scheduling.

## 2024-07-13 PROBLEM — M43.10 SPONDYLOLISTHESIS, GRADE 2: Status: RESOLVED | Noted: 2024-02-06 | Resolved: 2024-07-13

## 2024-07-13 PROBLEM — M54.16 SPINAL STENOSIS OF LUMBAR REGION WITH RADICULOPATHY: Status: RESOLVED | Noted: 2024-02-28 | Resolved: 2024-07-13

## 2024-07-13 PROBLEM — M48.061 SPINAL STENOSIS OF LUMBAR REGION WITH RADICULOPATHY: Status: RESOLVED | Noted: 2024-02-28 | Resolved: 2024-07-13

## 2024-07-13 NOTE — PROGRESS NOTES
04/10/24 0500   Appointment Info   Signing clinician's name / credentials michelle [email or web address] pt   Total/Authorized Visits 6   Visits Used 3   Medical Diagnosis spondylolithesis, spinal stenosis   PT Tx Diagnosis lumbar pain, decreased ROM/strength   Progress Note/Certification   Therapy Frequency 1x/week   Predicted Duration 6 weeks   PT Goal 1   Goal Identifier ambulation   Goal Description pt able to walk 1 mile pain level 2   Rationale to maximize safety and independence with performance of ADLs and functional tasks;to maximize safety and independence within the home;to maximize safety and independence within the community;to maximize safety and independence with transportation;to maximize safety and independence with self cares   Target Date 04/10/24   Subjective Report   Subjective Report pt reports limited exercising secondary to being busy at work. pt notes pins and needles sensation with sitting in left lower extremity   Objective Measure 1   Objective Measure standing lumbar extension decreases pins and neeedles sensation in left lower extremity   PT Modalities   PT Modalities Cryotherapy   Cryotherapy   Treatment Detail HEP   Treatment Interventions (PT)   Interventions Therapeutic Procedure/Exercise   Therapeutic Procedure/Exercise   Therapeutic Procedures: strength, endurance, ROM, flexibility minutes (06865) 39   Therapeutic Procedures Ther Proc 2;Ther Proc 3;Ther Proc 4;Ther Proc 5;Ther Proc 6;Ther Proc 7   Ther Proc 1 standing hamstring stretch 3 x 10 seconds   Ther Proc 1 - Details supine abdominal #6 10x   Ther Proc 2 supine abdominal #5 15x   Ther Proc 2 - Details UBE 4 minutes   Ther Proc 3 quadratus lumborum stretch 3 x 10 seconds   Ther Proc 3 - Details row 15 x red   Ther Proc 4 pulldowns 15 x red   Skilled Intervention verbal cueing   Patient Response/Progress tolerated well   Ther Proc 4 - Details bridge 10 x 5 seconds   Ther Proc 5 standing lumbar extension 10 x 5 seconds   Education    Learner/Method No Barriers to Learning   Plan   Home program see PTRX   Plan for next session progress as tolerated   Total Session Time   Timed Code Treatment Minutes 39   Total Treatment Time (sum of timed and untimed services) 39         DISCHARGE  Reason for Discharge: Patient has failed to schedule further appointments.    Equipment Issued:     Discharge Plan: Patient to continue home program.    Referring Provider:  Ghassan Green

## 2024-09-05 ENCOUNTER — VIRTUAL VISIT (OUTPATIENT)
Dept: CARDIOLOGY | Facility: CLINIC | Age: 49
End: 2024-09-05
Payer: COMMERCIAL

## 2024-09-05 VITALS — BODY MASS INDEX: 33.56 KG/M2 | HEIGHT: 70 IN | WEIGHT: 234.4 LBS

## 2024-09-05 DIAGNOSIS — E11.9 TYPE 2 DIABETES MELLITUS WITHOUT COMPLICATION, WITHOUT LONG-TERM CURRENT USE OF INSULIN (H): Primary | ICD-10-CM

## 2024-09-05 DIAGNOSIS — I10 BENIGN ESSENTIAL HYPERTENSION: ICD-10-CM

## 2024-09-05 DIAGNOSIS — E66.811 OBESITY, CLASS I, BMI 30-34.9: ICD-10-CM

## 2024-09-05 DIAGNOSIS — F41.1 GENERALIZED ANXIETY DISORDER: ICD-10-CM

## 2024-09-05 DIAGNOSIS — Z78.9 TAKES DIETARY SUPPLEMENTS: ICD-10-CM

## 2024-09-05 ASSESSMENT — PAIN SCALES - GENERAL: PAINLEVEL: NO PAIN (0)

## 2024-09-05 NOTE — PROGRESS NOTES
Medication Therapy Management (MTM) Encounter    ASSESSMENT:                            Medication Adherence/Access: recommend [facility] for more reliable access to Mounjaro.      Weight Management /Diabetes: A1C at goal <7%.  Patient would benefit from continuing pharmacotherapy for weight management. Given tolerating well, class II obesity, recommend optimizing GLP1/GIP therapy as data to support most significant weight loss and patient has no contraindications. Patient also realizing benefit from reduction in food noise and increased satiety. Education provided on continued dietary and behavioral modifications.       Hypertension: stable - recommend home blood pressure monitoring for safety and effectiveness. Hypotension possible secondary to weight loss - education provided for monitoring.    Supplements: stable     Anxiety : stable; may consider alternative lower risk anxiolytic like propranolol or hydroxyzine in the future if needed.    PLAN:                            Continue Mounjaro 10 mg weekly - 90 day supply with 1 refill sent to [facility]    Phone: [phone]  - Press 2 to leave a refill request on a secured voicemail  - Press 3 to place an automated refill request  - Press 4 to speak directly to a member of the pharmacy staff    Hours: Monday through Friday 8am to 7pm and Saturday 8am to 4pm    To help with tolerability and effectiveness of Mounjaro :  Eat small meals/snacks throughout the day (about every 2-4 hours)  Focus on getting protein in first with each meal and snack.   A good starting goal is 60 g protein daily (track this, especially if at weight loss plateau). Once you consistently are getting 60g daily, try getting 90 g protein daily.  Drink plenty of water - goal 64 oz throughout the day  You may try Metamucil, Benefiber, or Citrucel to help feel more full (less nausea) and have softer, more consistent bowel movements.  To optimize  weight management - work on incorporating resistance training/weight lifting to build muscle and improve overall metabolism of adipose tissue.      Follow-up: 3 months and 6 months with Marsha Romero, [facility] and Tsering Alfaro PA-C (alternating visits).    SUBJECTIVE/OBJECTIVE:                          Holli Saxena is a 49 year old female seen for an initial visit. She was referred to me from Tsering Alfaro PA-C .      Reason for visit: Medication Therapy Management - GLP1/GIP Management  .    Allergies/ADRs: Reviewed in chart  Past Medical History: Reviewed in chart  Tobacco: She reports that she has never smoked. She has never used smokeless tobacco.  Alcohol: not currently using      Medication Adherence/Access: patient having difficulty getting Mounjaro reliably from [facility] -- prefers 90 day supply to prevent lapse in treatment, wonders if able to get from another pharmacy?        Weight Management /Diabetes:   Mounjaro 10 mg week x 2 months  Not taking aspirin due to age    Last visit with Tsering Alfaro PA-C 6/7/24 for Return Medical Weight Management Visit     Patient is not experiencing side effects. Eating well and noting that portion size is much smaller portions. Notes significant improvements in food noise - 10mg Mounjaro  dose feels perfect to balance nutrition and not over eating.    Nutrition/Eating Habits: working to focus on protein and water intake -feels good when eating this way and Mounjaro helps support this.      Exercise/Activity: working to increase activity to support weight loss, stamina, strength.     Current diabetes symptoms: none  Blood sugar monitoring: A1C only given well controlled and not on any meds with high risk for hypoglycemia.    Medications Tried/Failed/Considerations:  Per visit notes with Dr. Umu Mahoney 12/10/21 and 3/8/22: Patient has no personal or family history of thyroid cancer, Pancreatitis.    Per visit notes with Tsering Alfaro PA-C  "6/7/24  Metformin - stopped   Topiramate - discontinued due to side effect of fatigue   Phentermine - discontinued due to pregnancy   Weight gain due to pregnancy in 2018 - 264lbs  Contrave - stopped due to no longer helping  Restarted phentermine and topiramate  - discontinued during pandemic  Started Ozempic for type II diabetes   Mounjaro denied due to A1C <7%, increased Ozempic 2.0mg   Mounjaro approved and transitioned to 7.5mg     Initial Consult Weight: 225 lbs  Highest weight: 247 lbs since [facility]      Current weight today: 234 lbs 6.4 oz  Cumulative Weight Gain:+9 lb, +4% from baseline; decreasing from highest weight, however.    Wt Readings from Last 4 Encounters:   09/05/24 106.3 kg (234 lb 6.4 oz)   06/07/24 109.6 kg (241 lb 9.6 oz)   02/06/24 112 kg (247 lb)   01/19/24 110.9 kg (244 lb 9.6 oz)     Estimated body mass index is 33.63 kg/m  as calculated from the following:    Height as of this encounter: 1.778 m (5' 10\").    Weight as of this encounter: 106.3 kg (234 lb 6.4 oz).    Lab Results   Component Value Date    A1C 5.8 (H) 05/08/2024     (H) 11/19/2023     11/19/2023    POTASSIUM 4.1 11/19/2023    JOSE DANIEL 9.3 11/19/2023    CHLORIDE 106 11/19/2023    CO2 23 11/19/2023    BUN 7.1 11/19/2023    CR 0.67 11/19/2023    GFRESTIMATED >90 11/19/2023    CHOL 179 11/19/2023     (H) 11/19/2023    HDL 43 (L) 11/19/2023    TRIG 145 11/19/2023    VITDT 57 10/29/2014    UMALCR  11/19/2023      Comment:      Unable to calculate, urine albumin and/or urine creatinine is outside detectable limits.  Microalbuminuria is defined as an albumin:creatinine ratio of 17 to 299 for males and 25 to 299 for females. A ratio of albumin:creatinine of 300 or higher is indicative of overt proteinuria.  Due to biologic variability, positive results should be confirmed by a second, first-morning random or 24-hour timed urine specimen. If there is discrepancy, a " "third specimen is recommended. When 2 out of 3 results are in the microalbuminuria range, this is evidence for incipient nephropathy and warrants increased efforts at glucose control, blood pressure control, and institution of therapy with an angiotensin-converting-enzyme (ACE) inhibitor (if the patient can tolerate it).      TSH 1.54 11/19/2023     Liver Function Studies -   Recent Labs   Lab Test 11/19/23  0958   PROTTOTAL 7.3   ALBUMIN 4.4   BILITOTAL 0.4   ALKPHOS 85   AST 26   ALT 24       Hypertension:   Lisinopril 20 mg once daily   Nifedipine ER 30 mg once daily    Patient reports no current medication side effects.  Patient has blood pressure cuff, does not monitor at home.      Supplements:   Multivitamin once daily  Flax seed once daily    No reported issues at this time.     Anxiety :   Lorazepam 0.5mg twice daily as needed for anxiety.    Uses rarely. Works well when needed.      Today's Vitals: Ht 1.778 m (5' 10\")   Wt 106.3 kg (234 lb 6.4 oz)   BMI 33.63 kg/m    ----------------      I spent 17 minutes with this patient today. All changes were made via collaborative practice agreement with Tsering Alfaro. A copy of the visit note was provided to the patient's provider(s).    A summary of these recommendations was sent via [employer].    Marsha Romero, Pharm D., MPH    Medication Therapy Management Pharmacist   [facility]      Telemedicine Visit Details  Type of service:  Video Conference via [employer]  Start Time:  08:02 AM  End Time: 8:19 AM     Medication Therapy Recommendations  Type 2 diabetes mellitus (H)    Current Medication: tirzepatide (MOUNJARO) 10 MG/0.5ML pen (Discontinued)   Rationale: Medication product not available - Adherence - Adherence   Recommendation: Provide Adherence Intervention   Status: Accepted per CPA            "

## 2024-09-05 NOTE — LETTER
9/5/2024      RE: Holli Saxena  [id number]  [place] [address]       Dear Colleague,    Thank you for the opportunity to participate in the care of your patient, Holli Saxena, at the [facility]. Please see a copy of my visit note below.    Medication Therapy Management (MTM) Encounter    ASSESSMENT:                            Medication Adherence/Access: recommend [facility] for more reliable access to Mounjaro.      Weight Management /Diabetes: A1C at goal <7%.  Patient would benefit from continuing pharmacotherapy for weight management. Given tolerating well, class II obesity, recommend optimizing GLP1/GIP therapy as data to support most significant weight loss and patient has no contraindications. Patient also realizing benefit from reduction in food noise and increased satiety. Education provided on continued dietary and behavioral modifications.       Hypertension: stable - recommend home blood pressure monitoring for safety and effectiveness. Hypotension possible secondary to weight loss - education provided for monitoring.    Supplements: stable     Anxiety : stable; may consider alternative lower risk anxiolytic like propranolol or hydroxyzine in the future if needed.    PLAN:                            Continue Mounjaro 10 mg weekly - 90 day supply with 1 refill sent to [facility]    Phone: [phone]  - Press 2 to leave a refill request on a secured voicemail  - Press 3 to place an automated refill request  - Press 4 to speak directly to a member of the pharmacy staff    Hours: Monday through Friday 8am to 7pm and Saturday 8am to 4pm    To help with tolerability and effectiveness of Mounjaro :  Eat small meals/snacks throughout the day (about every 2-4 hours)  Focus on getting protein in first with each meal and snack.   A good starting goal is 60 g  protein daily (track this, especially if at weight loss plateau). Once you consistently are getting 60g daily, try getting 90 g protein daily.  Drink plenty of water - goal 64 oz throughout the day  You may try Metamucil, Benefiber, or Citrucel to help feel more full (less nausea) and have softer, more consistent bowel movements.  To optimize weight management - work on incorporating resistance training/weight lifting to build muscle and improve overall metabolism of adipose tissue.      Follow-up: 3 months and 6 months with Marsha Romero [facility] and Tsering Alfaro PA-C (alternating visits).    SUBJECTIVE/OBJECTIVE:                          Holli Saxena is a 49 year old female seen for an initial visit. She was referred to me from Tsering Alfaro PA-C .      Reason for visit: Medication Therapy Management - GLP1/GIP Management  .    Allergies/ADRs: Reviewed in chart  Past Medical History: Reviewed in chart  Tobacco: She reports that she has never smoked. She has never used smokeless tobacco.  Alcohol: not currently using      Medication Adherence/Access: patient having difficulty getting Mounjaro reliably from [facility] -- prefers 90 day supply to prevent lapse in treatment, wonders if able to get from another pharmacy?        Weight Management/Diabetes:   Mounjaro 10 mg week x 2 months  Not taking aspirin due to age    Last visit with Tsering Alfaro PA-C 6/7/24 for Return Medical Weight Management Visit     Patient is not experiencing side effects. Eating well and noting that portion size is much smaller portions. Notes significant improvements in food noise - 10mg Mounjaro  dose feels perfect to balance nutrition and not over eating.    Nutrition/Eating Habits: working to focus on protein and water intake -feels good when eating this way and Mounjaro helps support this.      Exercise/Activity: working to increase activity to support weight loss, stamina, strength.     Current diabetes symptoms: none  Blood  "sugar monitoring: A1C only given well controlled and not on any meds with high risk for hypoglycemia.    Medications Tried/Failed/Considerations:  Per visit notes with Dr. Umu Mahoney 12/10/21 and 3/8/22: Patient has no personal or family history of thyroid cancer, Pancreatitis.    Per visit notes with Tsering Alfaro PA-C 6/7/24  Metformin - stopped   Topiramate - discontinued due to side effect of fatigue   Phentermine - discontinued due to pregnancy   Weight gain due to pregnancy in 2018 - 264lbs  Contrave - stopped due to no longer helping  Restarted phentermine and topiramate  - discontinued during pandemic  Started Ozempic for type II diabetes   Mounjaro denied due to A1C <7%, increased Ozempic 2.0mg   Mounjaro approved and transitioned to 7.5mg     Initial Consult Weight: 225 lbs  Highest weight: 247 lbs since [facility]-establishing with [facility]      Current weight today: 234 lbs 6.4 oz  Cumulative Weight Gain:+9 lb, +4% from baseline; decreasing from highest weight, however.    Wt Readings from Last 4 Encounters:   09/05/24 106.3 kg (234 lb 6.4 oz)   06/07/24 109.6 kg (241 lb 9.6 oz)   02/06/24 112 kg (247 lb)   01/19/24 110.9 kg (244 lb 9.6 oz)     Estimated body mass index is 33.63 kg/m  as calculated from the following:    Height as of this encounter: 1.778 m (5' 10\").    Weight as of this encounter: 106.3 kg (234 lb 6.4 oz).    Lab Results   Component Value Date    A1C 5.8 (H) 05/08/2024     (H) 11/19/2023     11/19/2023    POTASSIUM 4.1 11/19/2023    JOSE DANIEL 9.3 11/19/2023    CHLORIDE 106 11/19/2023    CO2 23 11/19/2023    BUN 7.1 11/19/2023    CR 0.67 11/19/2023    GFRESTIMATED >90 11/19/2023    CHOL 179 11/19/2023     (H) 11/19/2023    HDL 43 (L) 11/19/2023    TRIG 145 11/19/2023    VITDT 57 10/29/2014    UMALCR  11/19/2023      Comment:      Unable to calculate, urine albumin and/or urine creatinine is outside detectable limits.  Microalbuminuria is " "defined as an albumin:creatinine ratio of 17 to 299 for males and 25 to 299 for females. A ratio of albumin:creatinine of 300 or higher is indicative of overt proteinuria.  Due to biologic variability, positive results should be confirmed by a second, first-morning random or 24-hour timed urine specimen. If there is discrepancy, a third specimen is recommended. When 2 out of 3 results are in the microalbuminuria range, this is evidence for incipient nephropathy and warrants increased efforts at glucose control, blood pressure control, and institution of therapy with an angiotensin-converting-enzyme (ACE) inhibitor (if the patient can tolerate it).      TSH 1.54 11/19/2023     Liver Function Studies -   Recent Labs   Lab Test 11/19/23  0958   PROTTOTAL 7.3   ALBUMIN 4.4   BILITOTAL 0.4   ALKPHOS 85   AST 26   ALT 24       Hypertension:   Lisinopril 20 mg once daily   Nifedipine ER 30 mg once daily    Patient reports no current medication side effects.  Patient has blood pressure cuff, does not monitor at home.      Supplements:   Multivitamin once daily  Flax seed once daily    No reported issues at this time.     Anxiety :   Lorazepam 0.5mg twice daily as needed for anxiety.    Uses rarely. Works well when needed.      Today's Vitals: Ht 1.778 m (5' 10\")   Wt 106.3 kg (234 lb 6.4 oz)   BMI 33.63 kg/m    ----------------      I spent 17 minutes with this patient today. All changes were made via collaborative practice agreement with Tsering Alfaro. A copy of the visit note was provided to the patient's provider(s).    A summary of these recommendations was sent via [employer].    Marsha Romero, Pharm D., MPH    Medication Therapy Management Pharmacist   [facility]      Telemedicine Visit Details  Type of service:  Video Conference via [employer]  Start Time:  08:02 AM  End Time: 8:19 AM     Medication Therapy Recommendations  Type 2 diabetes mellitus (H)    Current Medication: " tirzepatide (MOUNJARO) 10 MG/0.5ML pen (Discontinued)   Rationale: Medication product not available - Adherence - Adherence   Recommendation: Provide Adherence Intervention   Status: Accepted per CPA                Please do not hesitate to contact me if you have any questions/concerns.     Sincerely,     Marsha Romero, [facility]

## 2024-09-05 NOTE — Clinical Note
Holli is doing great on Mounjaro 10 mg- will continue this dose and focus on lifestyle modifications.  Alternating visits with Medication Therapy Management and you in 3 and 6 months to further support weight management and diabetes improvements.  Marsha

## 2024-09-05 NOTE — PATIENT INSTRUCTIONS
"Recommendations from MTM Pharmacist visit:                                                    MTM (medication therapy management) is a service provided by a clinical pharmacist designed to help you get the most of out of your medicines.  You may be sent a phone or email survey evaluating today's visit.  Please provide feedback you have for the service he received today if you are able.      Continue Mounjaro 10 mg weekly - 90 day supply with 1 refill sent to [facility]    Phone: [phone]  - Press 2 to leave a refill request on a secured voicemail  - Press 3 to place an automated refill request  - Press 4 to speak directly to a member of the pharmacy staff    Hours: Monday through Friday 8am to 7pm and Saturday 8am to 4pm    To help with tolerability and effectiveness of Mounjaro :  Eat small meals/snacks throughout the day (about every 2-4 hours)  Focus on getting protein in first with each meal and snack.   A good starting goal is 60 g protein daily (track this, especially if at weight loss plateau). Once you consistently are getting 60g daily, try getting 90 g protein daily.  Drink plenty of water - goal 64 oz throughout the day  You may try Metamucil, Benefiber, or Citrucel to help feel more full (less nausea) and have softer, more consistent bowel movements.  To optimize weight management - work on incorporating resistance training/weight lifting to build muscle and improve overall metabolism of adipose tissue.      Follow-up: 3 months and 6 months with Marsha Romero [facility] and Tsering Alfaro PA-C (alternating visits).        It was great speaking with you today.  I value your experience and would be very thankful for your time in providing feedback in our clinic survey. In the next few days, you may receive an email or text message from [facility] [employer] with a link to a survey related to your  clinical pharmacist.\"     To schedule another MTM appointment, please call the clinic directly " ([facility] Phone Number: [phone] (schedules for [facility] and [facility] - providers, dietitians, health coaches) or you may call the [facility] scheduling line at [phone] or toll-free at [phone].     My Clinical Pharmacist's contact information:                                                      Please feel free to contact me with any questions or concerns you have.      Marsha Romero, Pharm D., MPH    Medication Therapy Management Pharmacist   [facility]          Meal Replacement Shake Options:   *Protein Shake Criteria: no more than 210 Calories, at least 20 grams of protein, and less than 10 grams of sugar   Premier Protein (160 Calories, 30 g protein)  Slim Fast Advanced Nutrition (180 Calories, 20 g protein)  Muscle Milk, lactose-free, 17 oz bottle (210 Calories, 30 g protein)  Integrated Supplements, no artificial sugars (110 Calories, 20 g protein)  Boost/Ensure Max (160 calories, 30 gm protein)   Fairlife Protein Shakes (160-230 calories, 26-42 gm protein)  Aldi's Elevation Protein Powder (180 calories, 30 gm protein)   Orgain Protein Shakes (130-160 calories, 20-26 gm protein)     Meal Replacement Bar Options:  Quest Protein Bars (190 Calories, 20 g protein)  Built Bar (170 Calories, 15-20 g protein)  One Protein Bar (210 calories, 20 g protein)  Marie Signature Protein Bar ([employer]) (190 Calories, 21 g protein)  Pure Protein Bars (180 Calories, 21 g protein)    Low Calorie Frozen Meal:  Healthy Choice Power Bowls  Lean Cuisine  Smart Ones  Jeffrey Morrissey      ---------------------------------------------------------------  Tips to Increase the Protein in Your Diet  You may need more protein in your diet to help you heal from an illness, surgery or wound. Extra protein can also help you gain weight. Here are some ideas for adding high-protein foods to your meals.  Meat and fish  Add chopped cooked  meat to vegetables, salads, casseroles, soups, sauces and biscuit dough.  Use in omelets, soufflés, quiches, sandwich fillings and chicken or turkey stuffing.  Wrap in pie crust or biscuit dough to make a turnover.  Add to stuffed baked potatoes.  Make a dip with diced meat or flaked fish mixed with sour cream and spices.  Chopped, hard-cooked eggs  Add to salads.  Use for snacks and sandwich filling.  High-protein milk  To make high-protein milk, mix 1 quart whole milk with 1 cup powdered milk.  Add to cream soups, mashed potatoes, scrambled eggs, cereals and dried eggnog mix.  Use as an ingredient in puddings, custards, hot chocolate, milk shakes and pancakes.  Powdered milk  If you don't have any high-protein milk on hand, you can use powdered milk. Add 3 tablespoons to:  gravies, sauces, cream soups, mayonnaise  casseroles, meat patties, meatloaf, tuna salad, deviled ham  scalloped or mashed potatoes, creamed spinach  scrambled eggs, egg salad  cereals  yogurt, milk drinks, ice cream, frozen desserts, puddings, custards.  Add 4 to 6 tablespoons powdered milk to make:  cream sauces  breads, muffins, pancakes, waffles, cookies, cakes  cream pies, frostings, cake fillings  fruit cobblers, bread or rice pudding, gelatin desserts.  For high-protein eggnog, add 3 to 6 tablespoons powdered milk to prepared eggnog.  Hard or soft cheese  Melt on sandwiches, breads, tortillas, hamburgers, hot dogs, other meats, vegetables, eggs and pies.  Grate into soups, chili, sauces, casseroles, vegetables, potatoes, rice, noodles or meatloaf.  Eat with toast or crackers, or melt for linh dip.  Cottage cheese or ricotta cheese  Mix with or scoop on top of fruits and vegetables.  Add to casseroles, lasagna, spaghetti, noodles and egg dishes (omelets, scrambled eggs, soufflés).  Use in gelatin, pudding-type desserts, cheesecake and pancake batter.  Use to stuff crepes, pasta shells or manicotti.  Fruit yogurt  Blend with fruits for a  fruit smoothie.  Use as a dip for fruits and vegetables.  Scoop on top of pancakes or waffles.  Tofu  Blend silken tofu with fruits and juices for a smoothie.  Add chunks of firm tofu to soups and stews, or crumble into meatloaf.  Blend dried onion soup mix into soft or silken tofu for dip.  Use pureed silken tofu for part of the mayonnaise, sour cream, cream cheese or ricotta cheese called for in recipes.  Beans  Use cooked beans or peas in soups, casseroles, pasta, tacos and burritos.  Nuts and seeds  Note: For children under 3, discuss with the child's care team.  Use in casseroles, breads, muffins, pancakes, cookies and waffles.  Sprinkle on fruits, cereals, ice cream, yogurt, vegetables and salads.  Mix with raisins, dried fruits and chocolate chips for a snack.  Nut butters  Note: For children under 3, discuss with the child's care team.  Spread on sandwiches, toast, muffins, crackers, waffles, pancakes and fruit slices.  Use as a dip for raw vegetables.  Blend with milk drinks, or swirl through ice cream, yogurt or hot cereal.  Nutrition supplements (nutrition bars, drinks and powders)  Add powders to milk drinks and desserts.  Mix with ice cream, milk and fruit for a high-protein milk shake.    For informational purposes only. Not to replace the advice of your health care provider. Clinically reviewed by Sherrell Zayas RD, LD, and the Clinical Nutrition Service Line. Copyright   2005 [facility]. All rights reserved. [employer] 105414 - REV 04/24.      -----------------------------------------------------------------------------------------------------------------  Learning About High-Protein Foods  What foods are high in protein?     The foods you eat contain nutrients, such as vitamins and minerals. Protein is a nutrient. Your body needs the right amount to stay healthy and work as it should. You can use the list below to help you make choices about which foods to eat.  Here are some  "examples of foods that are high in protein.  Dairy and dairy alternatives  Cheese  Milk  Soy milk  Yogurt (especially Greek)  Meat  Beef  Chicken  Ham  Lamb  Lunch meat  Pork  Sausage  Turkey  Other protein foods  Beans (black, garbanzo, kidney, lima)  Eggs  Hummus  Lentils  Nuts  Peanut butter and other nut butters  Peas  Soybeans  Tofu  Veggie or soy apolinar (Check the nutrition label for the amount of protein in each serving.)  Seafood  Anchovies  Cod  Crab  Halibut  Barto  Sardines  Shrimp  Tilapia  Alta  Tuna  Protein supplements  Bars (Check the nutrition label for the amount of protein in each serving.)  Drinks  Powders  Work with your doctor to find out how much of this nutrient you need. Depending on your health, you may need more or less of it in your diet.  Where can you learn more?  Go to https://www.[employer].net/patiented  Enter P335 in the search box to learn more about \"Learning About High-Protein Foods.\"  Current as of: September 20, 2023               Content Version: 14.0    0826-0177 [employer].   Care instructions adapted under license by your healthcare professional. If you have questions about a medical condition or this instruction, always ask your healthcare professional. [employer] disclaims any warranty or liability for your use of this information.         "

## 2024-09-05 NOTE — NURSING NOTE
Current patient location: home     Is the patient currently in the state of [place]? YES    Visit mode:VIDEO    If the visit is dropped, the patient can be reconnected by: VIDEO VISIT: Text to cell phone:   Telephone Information:   Mobile [phone]       Will anyone else be joining the visit? NO  (If patient encounters technical issues they should call [phone]    How would you like to obtain your AVS? MyChart    Are changes needed to the allergy or medication list? Pt stated no changes to allergies and Pt stated no med changes    Are refills needed on medications prescribed by this physician? NO    Rooming Documentation:  Not applicable    Reason for visit: Medication Therapy Management    Wt other than 24 hrs:    Pain more than one location:  no  [facility]

## 2024-09-06 ENCOUNTER — TELEPHONE (OUTPATIENT)
Dept: CARDIOLOGY | Facility: CLINIC | Age: 49
End: 2024-09-06
Payer: COMMERCIAL

## 2024-09-08 ENCOUNTER — HEALTH MAINTENANCE LETTER (OUTPATIENT)
Age: 49
End: 2024-09-08

## 2024-09-09 ENCOUNTER — TELEPHONE (OUTPATIENT)
Dept: ENDOCRINOLOGY | Facility: CLINIC | Age: 49
End: 2024-09-09
Payer: COMMERCIAL

## 2024-09-09 NOTE — TELEPHONE ENCOUNTER
Left Voicemail (2nd Attempt) and Sent Mychart (2nd Attempt) for the patient to call back and schedule the following:    Appointment type: RET MTM   Provider: Marsha Romero RPH  Return date: 3 mos ( 12/05/2024 )   Specialty phone number: [phone]  Additional appointment(s) needed: LILI BELL, Tsering Alfaro PA-C, 6 mos ( Feb/March 2025 )  Additonal Notes: n/a

## 2024-11-17 ENCOUNTER — HEALTH MAINTENANCE LETTER (OUTPATIENT)
Age: 49
End: 2024-11-17

## 2025-01-03 ENCOUNTER — TELEPHONE (OUTPATIENT)
Dept: ENDOCRINOLOGY | Facility: CLINIC | Age: 50
End: 2025-01-03
Payer: COMMERCIAL

## 2025-01-03 NOTE — TELEPHONE ENCOUNTER
PA Initiation    Medication: MOUNJARO 10 MG/0.5ML SC SOAJ  Insurance Company: [employer] - Phone [phone] Fax [phone]  Pharmacy Filling the Rx: [facility] - [place], [place] - [facility] [address]  Filling Pharmacy Phone: [phone]  Filling Pharmacy Fax: [phone]  Start Date: 1/3/2025     Authorization Expiration Date: 1/18/2025     Key: [id number]   Waiting for clinical questionnaire to drop. Once received will complete and submit to patient's plan via CMM.

## 2025-01-05 ENCOUNTER — HEALTH MAINTENANCE LETTER (OUTPATIENT)
Age: 50
End: 2025-01-05

## 2025-01-08 NOTE — TELEPHONE ENCOUNTER
Questionnaire received and submitted to patient's plan along with chart notes.   Key: [id number]

## 2025-01-08 NOTE — TELEPHONE ENCOUNTER
Prior Authorization Approval    Medication: MOUNJARO 10 MG/0.5ML SC SOAJ  Authorization Effective Date: 12/8/2024  Authorization Expiration Date: 1/7/2026  Approved Dose/Quantity: uud  Reference #: Key: [id number]   Insurance Company: [employer] - Phone [phone] Fax [phone]  Expected CoPay: $    CoPay Card Available:      Financial Assistance Needed:    Which [facility] the prescription[facility] - [place], [place] - [address]  Pharmacy Notified: yes  Patient Notified: Yes

## 2025-02-15 DIAGNOSIS — E66.01 MORBID OBESITY (H): ICD-10-CM

## 2025-02-15 DIAGNOSIS — I10 HTN, GOAL BELOW 140/90: ICD-10-CM

## 2025-02-17 NOTE — TELEPHONE ENCOUNTER
Patient would have actually run out of Nifedipine 5/30/24 and Lisinopril around March 2024.      Left message on home/cell voicemail asking patient to return call to triage nurse about a refill. Is she still taking these meds?  Is cardiology ordering them?  LUISANA Bajwa R.N.

## 2025-02-17 NOTE — TELEPHONE ENCOUNTER
Clinic RN: Please investigate patient's chart or contact patient if the information cannot be found because patient should have run out of this medication on 05/2024 for Nifedipine and 03/2024 for lisinopril. Confirm patient is taking this medication as prescribed. Document findings and route refill encounter to provider for approval or denial.

## 2025-02-18 RX ORDER — NIFEDIPINE 30 MG/1
30 TABLET, EXTENDED RELEASE ORAL DAILY
Qty: 30 TABLET | Refills: 0 | Status: SHIPPED | OUTPATIENT
Start: 2025-02-18

## 2025-02-18 RX ORDER — LISINOPRIL 20 MG/1
20 TABLET ORAL DAILY
Qty: 30 TABLET | Refills: 0 | Status: SHIPPED | OUTPATIENT
Start: 2025-02-18

## 2025-02-18 NOTE — TELEPHONE ENCOUNTER
Attempt # 2  Called Phone # [phone]      Was Call answered? No     Non-detailed voicemail left on February 18, 2025 11:59 AM to call clinic at: [phone].     On Call Back:     Please relay need to verify patient is still taking both meds? Did she stop for a while and looking to restart? Has she been taking them consistently?      Thank you,  Patrice, Triage MEHNAZ Brumfield    11:59 AM 2/18/2025

## 2025-02-18 NOTE — TELEPHONE ENCOUNTER
Pt calling back.     Reports she's been taking:  -NIFEdipine ER 30mg and lisinopril 20mg daily consistently (has admit may have missed a few days here and there).  -Reports has ended up with a surplus to last her this long over some time    Will run out before future roberto with PCP 2/27/25

## 2025-03-17 SDOH — HEALTH STABILITY: PHYSICAL HEALTH: ON AVERAGE, HOW MANY MINUTES DO YOU ENGAGE IN EXERCISE AT THIS LEVEL?: 20 MIN

## 2025-03-17 SDOH — HEALTH STABILITY: PHYSICAL HEALTH: ON AVERAGE, HOW MANY DAYS PER WEEK DO YOU ENGAGE IN MODERATE TO STRENUOUS EXERCISE (LIKE A BRISK WALK)?: 2 DAYS

## 2025-03-17 ASSESSMENT — SOCIAL DETERMINANTS OF HEALTH (SDOH): HOW OFTEN DO YOU GET TOGETHER WITH FRIENDS OR RELATIVES?: TWICE A WEEK

## 2025-03-18 ENCOUNTER — OFFICE VISIT (OUTPATIENT)
Dept: INTERNAL MEDICINE | Facility: CLINIC | Age: 50
End: 2025-03-18
Payer: COMMERCIAL

## 2025-03-18 VITALS
RESPIRATION RATE: 12 BRPM | HEIGHT: 69 IN | OXYGEN SATURATION: 99 % | SYSTOLIC BLOOD PRESSURE: 115 MMHG | WEIGHT: 226.8 LBS | BODY MASS INDEX: 33.59 KG/M2 | TEMPERATURE: 98 F | HEART RATE: 98 BPM | DIASTOLIC BLOOD PRESSURE: 83 MMHG

## 2025-03-18 DIAGNOSIS — E11.9 TYPE 2 DIABETES MELLITUS WITHOUT COMPLICATION, WITHOUT LONG-TERM CURRENT USE OF INSULIN (H): ICD-10-CM

## 2025-03-18 DIAGNOSIS — Z23 NEED FOR VACCINATION: ICD-10-CM

## 2025-03-18 DIAGNOSIS — Z12.11 SCREEN FOR COLON CANCER: ICD-10-CM

## 2025-03-18 DIAGNOSIS — F41.0 PANIC ATTACK: ICD-10-CM

## 2025-03-18 DIAGNOSIS — Z12.31 ENCOUNTER FOR SCREENING MAMMOGRAM FOR BREAST CANCER: ICD-10-CM

## 2025-03-18 DIAGNOSIS — I10 HTN, GOAL BELOW 140/90: ICD-10-CM

## 2025-03-18 DIAGNOSIS — Z00.00 ROUTINE GENERAL MEDICAL EXAMINATION AT A HEALTH CARE FACILITY: Primary | ICD-10-CM

## 2025-03-18 DIAGNOSIS — E66.01 MORBID OBESITY (H): ICD-10-CM

## 2025-03-18 LAB
ERYTHROCYTE [DISTWIDTH] IN BLOOD BY AUTOMATED COUNT: 11.6 % (ref 10–15)
EST. AVERAGE GLUCOSE BLD GHB EST-MCNC: 108 MG/DL
HBA1C MFR BLD: 5.4 % (ref 0–5.6)
HCT VFR BLD AUTO: 44.3 % (ref 35–47)
HGB BLD-MCNC: 15.5 G/DL (ref 11.7–15.7)
MCH RBC QN AUTO: 31.1 PG (ref 26.5–33)
MCHC RBC AUTO-ENTMCNC: 35 G/DL (ref 31.5–36.5)
MCV RBC AUTO: 89 FL (ref 78–100)
PLATELET # BLD AUTO: 378 10E3/UL (ref 150–450)
RBC # BLD AUTO: 4.98 10E6/UL (ref 3.8–5.2)
WBC # BLD AUTO: 7 10E3/UL (ref 4–11)

## 2025-03-18 RX ORDER — LORAZEPAM 0.5 MG/1
0.5 TABLET ORAL 2 TIMES DAILY PRN
Qty: 10 TABLET | Refills: 0 | Status: SHIPPED | OUTPATIENT
Start: 2025-03-18

## 2025-03-18 RX ORDER — NIFEDIPINE 30 MG/1
30 TABLET, EXTENDED RELEASE ORAL DAILY
Qty: 90 TABLET | Refills: 4 | Status: SHIPPED | OUTPATIENT
Start: 2025-03-18

## 2025-03-18 RX ORDER — LISINOPRIL 20 MG/1
20 TABLET ORAL DAILY
Qty: 90 TABLET | Refills: 4 | Status: SHIPPED | OUTPATIENT
Start: 2025-03-18

## 2025-03-18 NOTE — PROGRESS NOTES
Dr Whatley's note    Patient's instructions / PLAN:                                                      Following management  Plan:   Labs today - [address]   2. Stop Mounjaro for 2 weeks before the colonoscopy   3. The following vaccines are recommended for you. Please check with your insurance about coverage.  Some insurances cover better if you have these vaccines at the pharmacy:  -- Flu, Covid   -- Pneumonia 20 -- because of diabetes   -- Tetanus vaccine   4.  Next ANNUAL EXAM after March 19, 2026   5. Mammogram ( please call [phone] to schedule it)         ASSESSMENT & PLAN:                                                      (Z00.00) Routine general medical examination at a health care facility  (primary encounter diagnosis)  Comment:   Plan: Hemoglobin A1c, CBC with platelets, Lipid panel        reflex to direct LDL Fasting, Comprehensive         metabolic panel, Albumin Random Urine         Quantitative with Creat Ratio, TSH with free T4        reflex, Vitamin B12            (Z12.11) Screen for colon cancer  Comment:   Plan: Hemoglobin A1c, CBC with platelets, Lipid panel        reflex to direct LDL Fasting, Comprehensive         metabolic panel, Albumin Random Urine         Quantitative with Creat Ratio, TSH with free T4        reflex, Vitamin B12, Colonoscopy Screening          Referral            (I10) HTN, goal below 140/90  Comment: Controlled    We discussed about blood pressure  going down with continuing losing the weight  If she has lightheadedness or if the blood pressure goes lower than 100, I advised her to skip nifedipine and call us  Plan: lisinopril (ZESTRIL) 20 MG tablet, NIFEdipine         ER OSMOTIC (PROCARDIA XL) 30 MG 24 hr tablet,         Hemoglobin A1c, CBC with platelets, Lipid panel        reflex to direct LDL Fasting, Comprehensive         metabolic panel, Albumin Random Urine         Quantitative with Creat Ratio, TSH with free T4        reflex, Vitamin B12             (F41.0) Panic attack  Comment: Very very rare  Plan: LORazepam (ATIVAN) 0.5 MG tablet            (Z12.31) Encounter for screening mammogram for breast cancer  Comment:   Plan: MA Screening Bilateral w/ Andrea            (Z23) Need for vaccination  Comment:   Plan: Td    (E66.01) Morbid obesity (H)  (E11.9) Type 2 diabetes mellitus without complication, without long-term current use of insulin (H)  Comment:   Plan: Hemoglobin A1c, CBC with platelets, Lipid panel        reflex to direct LDL Fasting, Comprehensive         metabolic panel, Albumin Random Urine         Quantitative with Creat Ratio, TSH with free T4        reflex, Vitamin B12               Chief Complaint:                                                        Annual exam  Follow up chronic medical problems      SUBJECTIVE:                                                    History of present illness     We reviewed the chronic medical problems as above.   I reviewed the recent tests results in Epic     Panic attack -- seldom. She has used 10 tabs Lorazepam in 2.5 y    No ac c/o    F/up w endo for DM, wt and mounjaro. No side effects      ROS:                                                      ROS: negative for fever, chills, cough, wheezes, chest pain, shortness of breath, vomiting, abdominal pain, leg swelling     PMHx: - reviewed  Past Medical History:   Diagnosis Date    Hx of previous reproductive problem     Hyperlipidemia     Obesity, unspecified     PCOS (polycystic ovarian syndrome)     Type 2 diabetes mellitus (H) 2022    Unspecified essential hypertension        PSHx: reviewed  Past Surgical History:   Procedure Laterality Date     SECTION N/A 2015    Procedure:  SECTION;  Surgeon: Meena Arnold MD;  Location:  [facility]    NO HISTORY OF SURGERY          Soc Hx: No daily alcohol, no smoking  Social History     Socioeconomic History    Marital status:      Spouse name: Not on file    Number of children:  Not on file    Years of education: Not on file    Highest education level: Not on file   Occupational History    Occupation:      Employer: [employer]   Tobacco Use    Smoking status: Never    Smokeless tobacco: Never   Vaping Use    Vaping status: Never Used   Substance and Sexual Activity    Alcohol use: No    Drug use: No    Sexual activity: Yes     Partners: Male     Birth control/protection: Pull-out method, None   Other Topics Concern    Parent/sibling w/ CABG, MI or angioplasty before 65F 55M? Yes     Comment: Mother at 57   Social History Narrative    How much exercise per week? walking    How much calcium per day? One glass milk per day, vitamin       How much caffeine per day? 3 cups per week    How much vitamin D per day? Multi-vitamin    Do you/your family wear seatbelts?  Yes    Do you/your family use safety helmets? No activities requiring use    Do you/your family use sunscreen? Yes    Do you/your family keep firearms in the home? Yes    Do you/your family have a smoke detector(s)? Yes        Do you feel safe in your home? Yes    Has anyone ever touched you in an unwanted manner? No     Explain     MEHNAZ Gleason June 24, 2014 11:44 AM                     Social Drivers of Health     Financial Resource Strain: Low Risk  (3/17/2025)    Financial Resource Strain     Within the past 12 months, have you or your family members you live with been unable to get utilities (heat, electricity) when it was really needed?: No   Food Insecurity: Low Risk  (3/17/2025)    Food Insecurity     Within the past 12 months, did you worry that your food would run out before you got money to buy more?: No     Within the past 12 months, did the food you bought just not last and you didn t have money to get more?: No   Transportation Needs: Low Risk  (3/17/2025)    Transportation Needs     Within the past 12 months, has lack of transportation kept you from medical appointments, getting your medicines,  non-medical meetings or appointments, work, or from getting things that you need?: No   Physical Activity: Insufficiently Active (3/17/2025)    Exercise Vital Sign     Days of Exercise per Week: 2 days     Minutes of Exercise per Session: 20 min   Stress: Stress Concern Present (3/17/2025)    [employer] - Occupational Stress Questionnaire     Feeling of Stress : Rather much   Social Connections: Unknown (3/17/2025)    Social Connection and Isolation Panel [NHANES]     Frequency of Communication with Friends and Family: Not on file     Frequency of Social Gatherings with Friends and Family: Twice a week     Attends Christian Services: Not on file     Active Member of Clubs or Organizations: Not on file     Attends Club or Organization Meetings: Not on file     Marital Status: Not on file   Interpersonal Safety: Low Risk  (3/18/2025)    Interpersonal Safety     Do you feel physically and emotionally safe where you currently live?: Yes     Within the past 12 months, have you been hit, slapped, kicked or otherwise physically hurt by someone?: No     Within the past 12 months, have you been humiliated or emotionally abused in other ways by your partner or ex-partner?: No   Housing Stability: Low Risk  (3/17/2025)    Housing Stability     Do you have housing? : Yes     Are you worried about losing your housing?: No        Fam Hx: reviewed  Family History   Problem Relation Age of Onset    Diabetes Mother         type 2    Cardiovascular Mother     Obesity Mother     Coronary Artery Disease Mother     Diabetes Father         type 2    Obesity Father     Hypertension Father     Coronary Artery Disease Father     Hyperlipidemia Father     Gynecology Sister         PCOS    Obesity Sister     Alzheimer Disease Paternal Grandmother     Cerebrovascular Disease Paternal Grandfather     Cardiovascular Paternal Grandfather     Coronary Artery Disease Paternal Grandfather     Hypertension Brother      "ELIANE Brother         mild MI    Obesity Brother     Hypertension Brother     Breast Cancer No family hx of     Ovarian Cancer No family hx of          Screening: reviewed      All: reviewed    Meds: reviewed  Current Outpatient Medications   Medication Sig Dispense Refill    Flaxseed, Linseed, (FLAXSEED OIL PO) Take as directed      lisinopril (ZESTRIL) 20 MG tablet TAKE 1 TABLET(20 MG) BY MOUTH DAILY 30 tablet 0    LORazepam (ATIVAN) 0.5 MG tablet Take 1 tablet (0.5 mg) by mouth 2 times daily as needed for anxiety 20 tablet 0    MOUNJARO 10 MG/0.5ML SOAJ Inject 0.5 mLs (10 mg) subcutaneously every 7 days. 6 mL 1    Multiple Vitamin (MULTIVITAMINS PO) Take 1 tablet by mouth daily.      NIFEdipine ER OSMOTIC (PROCARDIA XL) 30 MG 24 hr tablet TAKE 1 TABLET BY MOUTH DAILY. 30 tablet 0    tirzepatide (MOUNJARO) 10 MG/0.5ML pen Inject 10 mg subcutaneously every 7 days. 6 mL 1       OBJECTIVE:                                                    Physical Exam :  Blood pressure 115/83, pulse 98, temperature 98  F (36.7  C), temperature source Oral, resp. rate 12, height 1.753 m (5' 9\"), weight 102.9 kg (226 lb 12.8 oz), last menstrual period 03/05/2025, SpO2 99%, not currently breastfeeding.     NAD, appears comfortable  Skin clear, no rashes  Neck: supple, no JVD,  no thyroidmegaly  Lymph nodes non palpable in the cervical, supraclavicular axillaries,   Chest: clear to auscultation with good respiratory effort  Cardiac: S1S2, RRR, no mgr appreciated  Abdomen: soft, not tender, not distended, audible bowel sound, no hepatosplenomegaly, no palpable masses, no abdominal bruits  Extremities: no cyanosis, clubbing or edema.   Neuro: A, Ox3, no focal signs.  Breast exam in supine and erect position: they are symmetrical, no skin changes, no tenderness or nodes on palpation. Nipples are erect, no skin lesions, no discharge on pressure.    Pelvic exam: deferred, no symptoms, no hx of abnormal pap        Patient has been advised " of split billing requirements and indicates understanding: Yes.  At the check in, at the      Appropriate preventive services were discussed with this patient, including applicable screening as appropriate for nutrition, physical activity     Freya Whatley MD  Internal Medicine       ##############################################    Preventive Care Visit  [facility]  Freya Scales MD, Internal Medicine  Mar 18, 2025          Jun De La Garza is a 49 year old, presenting for the following:  Physical (Fasting.)        3/18/2025     8:42 AM   Additional Questions   Roomed by Nancy Dang MA   Accompanied by Self          HPI           Advance Care Planning  Patient does not have a Health Care Directive:       3/17/2025   General Health   How would you rate your overall physical health? (!) FAIR   Feel stress (tense, anxious, or unable to sleep) Rather much   (!) STRESS CONCERN      3/17/2025   Nutrition   Three or more servings of calcium each day? Yes   Diet: Other   If other, please elaborate: 18 hour fasting   How many servings of fruit and vegetables per day? (!) 0-1   How many sweetened beverages each day? 0-1         3/17/2025   Exercise   Days per week of moderate/strenous exercise 2 days   Average minutes spent exercising at this level 20 min   (!) EXERCISE CONCERN      3/17/2025   Social Factors   Frequency of gathering with friends or relatives Twice a week   Worry food won't last until get money to buy more No   Food not last or not have enough money for food? No   Do you have housing? (Housing is defined as stable permanent housing and does not include staying ouside in a car, in a tent, in an abandoned building, in an overnight shelter, or couch-surfing.) Yes   Are you worried about losing your housing? No   Lack of transportation? No   Unable to get utilities (heat,electricity)? No         3/17/2025   Dental   Dentist two times every year? Yes              Today's PHQ-2 Score:       2/26/2025     9:51 AM   PHQ-2 ( 1999 Pf[employer])   Q1: Little interest or pleasure in doing things 1   Q2: Feeling down, depressed or hopeless 1   PHQ-2 Score 2    Q1: Little interest or pleasure in doing things Several days   Q2: Feeling down, depressed or hopeless Several days   PHQ-2 Score 2       Patient-reported         3/17/2025   Substance Use   Alcohol more than 3/day or more than 7/wk Not Applicable   Do you use any other substances recreationally? No     Social History     Tobacco Use    Smoking status: Never    Smokeless tobacco: Never   Vaping Use    Vaping status: Never Used   Substance Use Topics    Alcohol use: No    Drug use: No           9/19/2023   LAST FHS-7 RESULTS   1st degree relative breast or ovarian cancer No   Any relative bilateral breast cancer No   Any male have breast cancer No   Any ONE woman have BOTH breast AND ovarian cancer No   Any woman with breast cancer before 50yrs No   2 or more relatives with breast AND/OR ovarian cancer No   2 or more relatives with breast AND/OR bowel cancer No        Mammogram Screening - Mammogram every 1-2 years updated in Health Maintenance based on mutual decision making        3/17/2025   STI Screening   New sexual partner(s) since last STI/HIV test? No     History of abnormal Pap smear: No - age 30- 64 PAP with HPV every 5 years recommended        Latest Ref Rng & Units 8/30/2023     2:09 PM 10/28/2013    12:00 AM 3/25/2010    12:00 AM   PAP / HPV   PAP  Negative for Intraepithelial Lesion or Malignancy (NILM)      PAP (Historical)   NIL  NIL    HPV 16 DNA Negative Negative      HPV 18 DNA Negative Negative      Other HR HPV Negative Negative        ASCVD Risk   The ASCVD Risk score (Chuy QUIGLEY, et al., 2019) failed to calculate for the following reasons:    The systolic blood pressure is missing        3/17/2025   Contraception/Family Planning   Questions about contraception or family planning No       "  Reviewed and updated as needed this visit by Provider                             Objective    Exam  LMP 03/05/2025 (Exact Date)    Estimated body mass index is 32.57 kg/m  as calculated from the following:    Height as of 9/5/24: 1.778 m (5' 10\").    Weight as of 1/24/25: 103 kg (227 lb).    Physical Exam          Signed Electronically by: Freya Scales MD    "

## 2025-03-18 NOTE — PATIENT INSTRUCTIONS
Plan:   Labs today - [address]   2. Stop Mounjaro for 2 weeks before the colonoscopy   3. The following vaccines are recommended for you. Please check with your insurance about coverage.  Some insurances cover better if you have these vaccines at the pharmacy:  -- Flu, Covid   -- Pneumonia 20 -- because of diabetes   -- Tetanus vaccine   4.  Next ANNUAL EXAM after March 19, 2026   5. Mammogram ( please call [phone] to schedule it)

## 2025-03-18 NOTE — NURSING NOTE
Prior to immunization administration, verified patients identity using patient s name and date of birth. Please see Immunization Activity for additional information.     Screening Questionnaire for Adult Immunization    Are you sick today?   No   Do you have allergies to medications, food, a vaccine component or latex?   No   Have you ever had a serious reaction after receiving a vaccination?   No   Do you have a long-term health problem with heart, lung, kidney, or metabolic disease (e.g., diabetes), asthma, a blood disorder, no spleen, complement component deficiency, a cochlear implant, or a spinal fluid leak?  Are you on long-term aspirin therapy?   No   Do you have cancer, leukemia, HIV/AIDS, or any other immune system problem?   No   Do you have a parent, brother, or sister with an immune system problem?   No   In the past 3 months, have you taken medications that affect  your immune system, such as prednisone, other steroids, or anticancer drugs; drugs for the treatment of rheumatoid arthritis, Crohn s disease, or psoriasis; or have you had radiation treatments?   No   Have you had a seizure, or a brain or other nervous system problem?   No   During the past year, have you received a transfusion of blood or blood    products, or been given immune (gamma) globulin or antiviral drug?   No   For women: Are you pregnant or is there a chance you could become       pregnant during the next month?   No   Have you received any vaccinations in the past 4 weeks?   No     Immunization questionnaire answers were all negative.      Patient instructed to remain in clinic for 15 minutes afterwards, and to report any adverse reactions.     Screening performed by Nancy Dang MA on 3/18/2025 at 9:27 AM.

## 2025-03-19 LAB
CHOLEST SERPL-MCNC: 215 MG/DL
CREAT UR-MCNC: 145.5 MG/DL
FASTING STATUS PATIENT QL REPORTED: YES
HDLC SERPL-MCNC: 43 MG/DL
LDLC SERPL CALC-MCNC: 137 MG/DL
MICROALBUMIN UR-MCNC: <12 MG/L
MICROALBUMIN/CREAT UR: NORMAL MG/G{CREAT}
NONHDLC SERPL-MCNC: 172 MG/DL
TRIGL SERPL-MCNC: 173 MG/DL
TSH SERPL DL<=0.005 MIU/L-ACNC: 1.9 UIU/ML (ref 0.3–4.2)
VIT B12 SERPL-MCNC: 198 PG/ML (ref 232–1245)

## 2025-03-20 LAB
ALBUMIN SERPL BCG-MCNC: 4.7 G/DL (ref 3.5–5.2)
ALP SERPL-CCNC: 84 U/L (ref 40–150)
ALT SERPL W P-5'-P-CCNC: 14 U/L (ref 0–50)
ANION GAP SERPL CALCULATED.3IONS-SCNC: 11 MMOL/L (ref 7–15)
AST SERPL W P-5'-P-CCNC: 19 U/L (ref 0–45)
BILIRUB SERPL-MCNC: 0.4 MG/DL
BUN SERPL-MCNC: 10.4 MG/DL (ref 6–20)
CALCIUM SERPL-MCNC: ABNORMAL MG/DL
CHLORIDE SERPL-SCNC: 104 MMOL/L (ref 98–107)
CREAT SERPL-MCNC: 0.77 MG/DL (ref 0.51–0.95)
EGFRCR SERPLBLD CKD-EPI 2021: >90 ML/MIN/1.73M2
FASTING STATUS PATIENT QL REPORTED: YES
GLUCOSE SERPL-MCNC: 106 MG/DL (ref 70–99)
HCO3 SERPL-SCNC: 24 MMOL/L (ref 22–29)
POTASSIUM SERPL-SCNC: 5.2 MMOL/L (ref 3.4–5.3)
PROT SERPL-MCNC: 7.8 G/DL (ref 6.4–8.3)
SODIUM SERPL-SCNC: 139 MMOL/L (ref 135–145)

## 2025-03-23 ENCOUNTER — MYC MEDICAL ADVICE (OUTPATIENT)
Dept: INTERNAL MEDICINE | Facility: CLINIC | Age: 50
End: 2025-03-23
Payer: COMMERCIAL

## 2025-03-23 DIAGNOSIS — E53.8 VITAMIN B 12 DEFICIENCY: Primary | ICD-10-CM

## 2025-03-23 DIAGNOSIS — E56.9 VITAMIN DEFICIENCY: ICD-10-CM

## 2025-03-23 RX ORDER — CYANOCOBALAMIN (VITAMIN B-12) 2500 MCG
2500 TABLET, SUBLINGUAL SUBLINGUAL DAILY
Qty: 100 TABLET | Refills: 1 | Status: SHIPPED | OUTPATIENT
Start: 2025-03-23

## 2025-06-06 PROBLEM — E66.811 CLASS 1 OBESITY WITH SERIOUS COMORBIDITY AND BODY MASS INDEX (BMI) OF 34.0 TO 34.9 IN ADULT, UNSPECIFIED OBESITY TYPE: Status: ACTIVE | Noted: 2021-12-10

## 2025-06-22 ENCOUNTER — HEALTH MAINTENANCE LETTER (OUTPATIENT)
Age: 50
End: 2025-06-22

## 2025-08-20 ENCOUNTER — PATIENT OUTREACH (OUTPATIENT)
Dept: CARE COORDINATION | Facility: CLINIC | Age: 50
End: 2025-08-20
Payer: COMMERCIAL